# Patient Record
Sex: MALE | Race: WHITE | NOT HISPANIC OR LATINO | Employment: FULL TIME | URBAN - METROPOLITAN AREA
[De-identification: names, ages, dates, MRNs, and addresses within clinical notes are randomized per-mention and may not be internally consistent; named-entity substitution may affect disease eponyms.]

---

## 2017-02-15 DIAGNOSIS — E10.9 DIABETES MELLITUS TYPE 1 (H): ICD-10-CM

## 2017-02-15 RX ORDER — PEN NEEDLE, DIABETIC 31 GX5/16"
NEEDLE, DISPOSABLE MISCELLANEOUS
Qty: 300 EACH | Refills: 3 | Status: SHIPPED | OUTPATIENT
Start: 2017-02-15 | End: 2018-07-21

## 2017-04-04 ENCOUNTER — MYC MEDICAL ADVICE (OUTPATIENT)
Dept: NEUROLOGY | Facility: CLINIC | Age: 47
End: 2017-04-04

## 2017-04-04 DIAGNOSIS — E10.9 DM W/O COMPLICATION TYPE I (H): ICD-10-CM

## 2017-06-02 ENCOUNTER — TELEPHONE (OUTPATIENT)
Dept: ENDOCRINOLOGY | Facility: CLINIC | Age: 47
End: 2017-06-02

## 2017-06-02 DIAGNOSIS — E10.9 DM W/O COMPLICATION TYPE I (H): Primary | ICD-10-CM

## 2017-06-02 RX ORDER — INSULIN GLARGINE 100 [IU]/ML
26 INJECTION, SOLUTION SUBCUTANEOUS DAILY
Qty: 30 ML | Refills: 0 | Status: SHIPPED | OUTPATIENT
Start: 2017-06-02 | End: 2017-07-26

## 2017-06-02 NOTE — TELEPHONE ENCOUNTER
Writer reviewed Basaglar with patient.     He is agreeable with plan.    Prescriptions completed to pharmacy.    Follow up appt scheduled 7/26 with labs in morning.    Anju Barrios LPN  Adult Endocrinology  [facility]

## 2017-06-02 NOTE — TELEPHONE ENCOUNTER
[facility] Call Center    Phone Message    Name of Caller: Aniyah    Phone Number: Other phone number:  [phone]    Best time to return call: Any    May a detailed message be left on voicemail: NA    Relation to patient: Other Name: Aniyah  Relationship: [facility] [place]  Is there legal documentation in chart to discuss information with this person: NA    Reason for Call: Aniyah called and is requesting a Prior Authorization be started for Tl's Lantus.      Action Taken: Message routed to:  Adult Clinics: Endocrinology p [phone]

## 2017-07-26 ENCOUNTER — OFFICE VISIT (OUTPATIENT)
Dept: ENDOCRINOLOGY | Facility: CLINIC | Age: 47
End: 2017-07-26
Payer: COMMERCIAL

## 2017-07-26 VITALS
HEIGHT: 70 IN | DIASTOLIC BLOOD PRESSURE: 70 MMHG | SYSTOLIC BLOOD PRESSURE: 105 MMHG | WEIGHT: 209.66 LBS | HEART RATE: 62 BPM | BODY MASS INDEX: 30.02 KG/M2

## 2017-07-26 DIAGNOSIS — E10.9 TYPE 1 DIABETES MELLITUS WITHOUT COMPLICATION (H): ICD-10-CM

## 2017-07-26 DIAGNOSIS — E10.9 DM W/O COMPLICATION TYPE I (H): ICD-10-CM

## 2017-07-26 DIAGNOSIS — E78.00 HYPERCHOLESTEREMIA: ICD-10-CM

## 2017-07-26 LAB
ALBUMIN SERPL-MCNC: 3.6 G/DL (ref 3.4–5)
ALP SERPL-CCNC: 65 U/L (ref 40–150)
ALT SERPL W P-5'-P-CCNC: 26 U/L (ref 0–70)
ANION GAP SERPL CALCULATED.3IONS-SCNC: 5 MMOL/L (ref 3–14)
AST SERPL W P-5'-P-CCNC: 20 U/L (ref 0–45)
BILIRUB SERPL-MCNC: 0.8 MG/DL (ref 0.2–1.3)
BUN SERPL-MCNC: 18 MG/DL (ref 7–30)
CALCIUM SERPL-MCNC: 8.6 MG/DL (ref 8.5–10.1)
CHLORIDE SERPL-SCNC: 108 MMOL/L (ref 94–109)
CHOLEST SERPL-MCNC: 158 MG/DL
CK SERPL-CCNC: 194 U/L (ref 30–300)
CO2 SERPL-SCNC: 29 MMOL/L (ref 20–32)
CREAT SERPL-MCNC: 0.84 MG/DL (ref 0.66–1.25)
CREAT UR-MCNC: 246 MG/DL
GFR SERPL CREATININE-BSD FRML MDRD: NORMAL ML/MIN/1.7M2
GLUCOSE SERPL-MCNC: 86 MG/DL (ref 70–99)
HBA1C MFR BLD: 7.1 % (ref 4.3–6)
HCT VFR BLD AUTO: 44.2 % (ref 40–53)
HDLC SERPL-MCNC: 60 MG/DL
LDLC SERPL CALC-MCNC: 86 MG/DL
MICROALBUMIN UR-MCNC: 11 MG/L
MICROALBUMIN/CREAT UR: 4.43 MG/G CR (ref 0–17)
NONHDLC SERPL-MCNC: 98 MG/DL
POTASSIUM SERPL-SCNC: NORMAL MMOL/L (ref 3.4–5.3)
PROT SERPL-MCNC: 7 G/DL (ref 6.8–8.8)
SODIUM SERPL-SCNC: 142 MMOL/L (ref 133–144)
TRIGL SERPL-MCNC: 59 MG/DL
TSH SERPL DL<=0.005 MIU/L-ACNC: 1.37 MU/L (ref 0.4–4)

## 2017-07-26 PROCEDURE — 85014 HEMATOCRIT: CPT | Performed by: INTERNAL MEDICINE

## 2017-07-26 PROCEDURE — 99214 OFFICE O/P EST MOD 30 MIN: CPT | Performed by: INTERNAL MEDICINE

## 2017-07-26 PROCEDURE — 83036 HEMOGLOBIN GLYCOSYLATED A1C: CPT | Performed by: INTERNAL MEDICINE

## 2017-07-26 PROCEDURE — 82550 ASSAY OF CK (CPK): CPT | Performed by: INTERNAL MEDICINE

## 2017-07-26 PROCEDURE — 80053 COMPREHEN METABOLIC PANEL: CPT | Performed by: INTERNAL MEDICINE

## 2017-07-26 PROCEDURE — 36415 COLL VENOUS BLD VENIPUNCTURE: CPT | Performed by: INTERNAL MEDICINE

## 2017-07-26 PROCEDURE — 84443 ASSAY THYROID STIM HORMONE: CPT | Performed by: INTERNAL MEDICINE

## 2017-07-26 PROCEDURE — 82043 UR ALBUMIN QUANTITATIVE: CPT | Performed by: INTERNAL MEDICINE

## 2017-07-26 PROCEDURE — 80061 LIPID PANEL: CPT | Performed by: INTERNAL MEDICINE

## 2017-07-26 RX ORDER — ATORVASTATIN CALCIUM 10 MG/1
10 TABLET, FILM COATED ORAL DAILY
Qty: 90 TABLET | Refills: 3 | Status: SHIPPED | OUTPATIENT
Start: 2017-07-26 | End: 2018-08-29

## 2017-07-26 RX ORDER — INSULIN GLARGINE 100 [IU]/ML
26 INJECTION, SOLUTION SUBCUTANEOUS DAILY
Qty: 27 ML | Refills: 3 | Status: SHIPPED | OUTPATIENT
Start: 2017-07-26 | End: 2018-08-03

## 2017-07-26 NOTE — PATIENT INSTRUCTIONS
Sending blood sugars to your provider at [facility]:  We want to help you with your diabetes management, which often requires frequent adjustments to your therapy. For your convenience, we have several ways to send your blood sugars to your doctor for review.    - Send message directly to your doctor through My Chart.  Please ask the rooming staff if you would like to sign up for My Chart.  This is a fast and confidential way to send your information and communicate directly with your provider.   - Record readings and fax to [phone].  We have a template for you to use for your convenience.  - If you have a Medtronic pump, upload to [employer] and notify your provider of your username and password.   - Stop by the clinic with your meter for download.   - My Chart or call Dulce Jose, Diabetes Educator at [phone]  - Call the clinic and speak to one of the endocrine nurses to relay information on the telephone.  Anju Lawrence, or Madyson at [phone].   -    Please call the on-call Endocrinologist at the [facility] for after       hours/weekend needs at [phone] Option #4.    Please note that you do not need to FAST if you are just having an A1C drawn. Please remember to ALWAYS bring your glucose meter with to your appointment. This data is very important for the management of your care.    Thank you!  Your [facility] Diabetes Care Team

## 2017-07-26 NOTE — NURSING NOTE
"Tl Sands's goals for this visit include: Follow up Diabetes  He requests these members of his care team be copied on today's visit information: NO    PCP: Cathryn Leung    Referring Provider:  No referring provider defined for this encounter.    Chief Complaint   Patient presents with     Diabetes       Initial There were no vitals taken for this visit. Estimated body mass index is 30.02 kg/(m^2) as calculated from the following:    Height as of 9/13/16: 1.778 m (5' 10\").    Weight as of 9/13/16: 94.9 kg (209 lb 3.5 oz).  Medication Reconciliation: complete    Do you need any medication refills at today's visit? NO    "

## 2017-07-26 NOTE — PROGRESS NOTES
Tl Sands is a 46 year old man seen in follow-up.     Tl was diagnosed with type 1 diabetes in 2005. He has not known diabetes complications and his average hemoglobin A1c over the recent years has been around 7 to 7.5. Today, it was 7.1, down from 7.6 in September last year.    He forgot to bring his meter. Reports checking his blood sugar 3 times a day, always in the morning and before dinner.  He experiences hypoglycemic episodes once a month, a few hours after going to sleep and he assumes some of them are due to taking a higher dose of insulin for the bedtime snack.  In general, he does a good job in taking insulin for food intake, with occasional missed dosages around lunchtime.  As a result, the predinner blood sugar tends to be elevated.  Current insulin regimen is 26 U Basaglar in am, 1 U Novolog per 10 grams CHO and a correction scale of 1 U per 25 mg above 120. According to the patient, the daily total dose of Novolog is around 30 U.     Diabetes complications:  Last eye exam 6/2016 - no DR   No h/o proteinuria   No numbness or tingling sensation   He denies prior episodes of loss of consciousness due to hypoglycemia.  The lowest blood glucose he remembers was 40.  He is able to recognize the hypoglycemic episodes.  In the 60s, he gets sweaty, weak, and he develops shortness of breath.  He has glucagon at home and his family members know how to use it.  He does kickboxing, cardio, golfing during summertime. He is coaching skating wintertime.   He has been compliant in taking atorvastatin at a dose of 10 mg 2-3 times a week.      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   Type 1 diabetes   Tinea pedis   Finger fracture     PSM:  B/L inguinal hernia repair   B/L arthroscopic knee surgery      Prescription Medications as of 7/26/2017             insulin glargine (BASAGLAR KWIKPEN) 100 UNIT/ML injection Inject 26 Units Subcutaneous daily    insulin lispro (HUMALOG KWIKPEN) 100 UNIT/ML  "injection INJECT 5-10 UNITS SUBCUTANEOUSLY BEFORE MEALS    B-D U/F 31G X 8 MM insulin pen needle USE ONE  FIVE TIMES DAILY (TO  BE  USED  WITH  INSULIN  PEN,  3  MEALS  AND  AT  BEDTIME)    atorvastatin (LIPITOR) 10 MG tablet TAKE ONE TABLET BY MOUTH ONCE DAILY    tadalafil (CIALIS) 20 MG tablet Take 1 tablet by mouth. As needed    sildenafil (VIAGRA) 100 MG tablet Take 1 tablet (100 mg) by mouth daily as needed for erectile dysfunction    glucagon (GLUCAGON EMERGENCY) 1 MG injection Inject 1 mg Subcutaneous once for 1 dose    econazole nitrate 1 % cream Apply topically 2 times daily    mometasone (ELOCON) 0.1 % lotion Apply twice daily    glucose blood VI test strips strip Test 4-5 times daily    LANCETS REGULAR MISC 1 Device 4 times daily.        HABITS:  He does not use tobacco or alcohol.      SOCIAL HISTORY:  He is  and lives with his wife and 2 of his 3 children in [place]. 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      Family history  Maternal grandfather - type 1 diabetes. Paternal uncle also has type 1 diabetes. No f/h of thyroid disease. Paternal uncle - colon cancer.     ROS: 10 point ROS neg other than below:  Weight stable   Occasional R hip pain   LBP      PHYSICAL EXAMINATION:   Wt Readings from Last 10 Encounters:   07/26/17 95.1 kg (209 lb 10.5 oz)   09/13/16 94.9 kg (209 lb 3.5 oz)   03/15/16 90.5 kg (199 lb 9 oz)   09/23/15 91.7 kg (202 lb 3.2 oz)   03/17/15 91.6 kg (201 lb 15.1 oz)   12/03/14 92.9 kg (204 lb 14.4 oz)   05/07/14 93.4 kg (206 lb)   08/28/13 94.8 kg (209 lb)   02/13/13 92.5 kg (204 lb)   08/01/12 89.4 kg (197 lb)     /70  Pulse 62  Ht 1.778 m (5' 10\")  Wt 95.1 kg (209 lb 10.5 oz)  BMI 30.08 kg/m2    GENERAL:  A healthy-appearing man.   EYES:  Extraocular movements are intact.  Sclerae are clear.  No retinopathy appreciated.   NECK:  No goiter, bruit or adenopathy.   CHEST:  Clear to auscultation.   CARDIOVASCULAR:  Regular rate and rhythm.  No murmur. "   ABDOMEN:  Soft.  No hepatomegaly or lipohypertrophy.   EXTREMITIES:  No pretibial edema. Ankle jerks absent bilaterally.  SKIN: hyperpigmented scaly lesion on the R temple; psoriatic scalp lesions   Feet: Sensation intact to monofilament testing, skin intact     LABORATORY TESTS:   I reviewed prior lab results documented in Epic.   Lab Results   Component Value Date    A1C 7.1 (H) 07/26/2017    A1C 7.6 09/13/2016    A1C 7.5 (H) 03/15/2016    A1C 7.9 (H) 09/23/2015    A1C 7.4 (H) 03/17/2015     Hemoglobin   Date Value Ref Range Status   12/03/2014 14.9 13.3 - 17.7 g/dL Final     Hematocrit   Date Value Ref Range Status   07/26/2017 44.2 40.0 - 53.0 % Final     Cholesterol   Date Value Ref Range Status   03/15/2016 164 <200 mg/dL Final     Cholesterol/HDL Ratio   Date Value Ref Range Status   03/17/2015 3.2 0.0 - 5.0 Final     HDL Cholesterol   Date Value Ref Range Status   03/15/2016 56 >39 mg/dL Final     LDL Cholesterol Calculated   Date Value Ref Range Status   03/15/2016 92 <100 mg/dL Final     Comment:     Desirable:       <100 mg/dl     No results found for: MICROALBUMIN  TSH   Date Value Ref Range Status   03/15/2016 1.45 0.40 - 4.00 mU/L Final       Last Basic Metabolic Panel:    Sodium   Date Value Ref Range Status   03/15/2016 142 133 - 144 mmol/L Final     Potassium   Date Value Ref Range Status   03/15/2016 3.9 3.4 - 5.3 mmol/L Final     Chloride   Date Value Ref Range Status   03/15/2016 106 94 - 109 mmol/L Final     Calcium   Date Value Ref Range Status   03/15/2016 9.0 8.5 - 10.1 mg/dL Final     Carbon Dioxide   Date Value Ref Range Status   03/15/2016 30 20 - 32 mmol/L Final     Urea Nitrogen   Date Value Ref Range Status   03/15/2016 17 7 - 30 mg/dL Final     Creatinine   Date Value Ref Range Status   03/15/2016 0.96 0.66 - 1.25 mg/dL Final     No results found for: GFR  Glucose   Date Value Ref Range Status   03/15/2016 178 (H) 70 - 99 mg/dL Final       No results found for: AST  ALT   Date Value  Ref Range Status   03/17/2015 28 0 - 70 U/L Final     No results found for: ALBUMIN    Assessment and plan:    1.  Type 1 diabetes mellitus, fairly well controlled, with no known diabetes complications.   Discussed about the option of considering a DEXCOM G5, in order to help him attains a better blood glucose control. He is going to check coverage with his insurance.    2. Hypercholesterolemia, controlled on atorvastatin taken 2-3 times a week.      RTC 6 months.     No orders of the defined types were placed in this encounter.

## 2017-07-26 NOTE — MR AVS SNAPSHOT
After Visit Summary   7/26/2017    Tl Sands    MRN: [id number]           Patient Information     Date Of Birth          1970        Visit Information        Provider Department      7/26/2017 8:00 AM Rupali Resendiz MD [facility]        Today's Diagnoses     DM w/o complication type I (H)        Hypercholesteremia          Care Instructions      Sending blood sugars to your provider at [facility]:  We want to help you with your diabetes management, which often requires frequent adjustments to your therapy. For your convenience, we have several ways to send your blood sugars to your doctor for review.    - Send message directly to your doctor through My Chart.  Please ask the rooming staff if you would like to sign up for My Chart.  This is a fast and confidential way to send your information and communicate directly with your provider.   - Record readings and fax to [phone].  We have a template for you to use for your convenience.  - If you have a Medtronic pump, upload to [employer] and notify your provider of your username and password.   - Stop by the clinic with your meter for download.   - My Chart or call Dulce Jose, Diabetes Educator at [phone]  - Call the clinic and speak to one of the endocrine nurses to relay information on the telephone.  Anju Lawrence or Madyson at [phone].   -    Please call the on-call Endocrinologist at the [facility] for after       hours/weekend needs at [phone] Option #4.    Please note that you do not need to FAST if you are just having an A1C drawn. Please remember to ALWAYS bring your glucose meter with to your appointment. This data is very important for the management of your care.    Thank you!  Your [facility] Diabetes Care Team                Follow-ups after your visit        Your next 10 appointments already scheduled     Jul 18, 2018  7:15 AM CDT   Return Visit with Rupali Resendiz MD,  "[facility]    [address] [address] [place] [id number] [phone]              Who to contact     If you have questions or need follow up information about today's clinic visit or your schedule please contact [facility] directly at [phone].  Normal or non-critical lab and imaging results will be communicated to you by MyChart, letter or phone within 4 business days after the clinic has received the results. If you do not hear from us within 7 days, please contact the clinic through [employer] or phone. If you have a critical or abnormal lab result, we will notify you by phone as soon as possible.  Submit refill requests through [employer] or call your pharmacy and they will forward the refill request to us. Please allow 3 business days for your refill to be completed.          Additional Information About Your Visit        [employer] Information     [employer] gives you secure access to your electronic health record. If you see a primary care provider, you can also send messages to your care team and make appointments. If you have questions, please call your primary care clinic.  If you do not have a primary care provider, please call [phone] and they will assist you.      [employer] is an electronic gateway that provides easy, online access to your medical records. With [employer], you can request a clinic appointment, read your test results, renew a prescription or communicate with your care team.     To access your existing account, please contact your [facility] or call [phone] for assistance.        Care EveryWhere ID     This is your Care EveryWhere ID. This could be used by other organizations to access your [facility] medical records  [id number]        Your Vitals Were     Pulse Height BMI (Body Mass Index)             62 1.778 m (5' 10\") 30.08 kg/m2          Blood Pressure from Last 3 " Encounters:   07/26/17 105/70   09/13/16 104/70   03/15/16 126/72    Weight from Last 3 Encounters:   07/26/17 95.1 kg (209 lb 10.5 oz)   09/13/16 94.9 kg (209 lb 3.5 oz)   03/15/16 90.5 kg (199 lb 9 oz)              Today, you had the following     No orders found for display         Today's Medication Changes          These changes are accurate as of: 7/26/17  8:33 AM.  If you have any questions, ask your nurse or doctor.               These medicines have changed or have updated prescriptions.        Dose/Directions    atorvastatin 10 MG tablet   Commonly known as:  LIPITOR   This may have changed:  See the new instructions.   Used for:  Hypercholesteremia   Changed by:  Rupali Resendiz MD        Dose:  10 mg   Take 1 tablet (10 mg) by mouth daily   Quantity:  90 tablet   Refills:  3            Where to get your medicines      These medications were sent to [facility] [address] [place] [address] [address] [facility] [address]  [address] [facility] [address] [facility] [address]     Phone:  [phone]     atorvastatin 10 MG tablet    insulin glargine 100 UNIT/ML injection    insulin lispro 100 UNIT/ML injection                Primary Care Provider Office Phone # Fax #    Antolinliyah JANET Leung [phone]       [address] [address]        Equal Access to Services     [facility] CenterHEATHER AH: Hadii george brumfield hadasho Soradha, waaxda luqadaha, qaybta kaalmada adeegyada, avinash marino. So [facility] [phone].    ATENCIÓN: Si habla español, tiene a graham disposición servicios gratuitos de asistencia lingüística. Llame al [phone].    We comply with applicable federal civil rights laws and [place] laws. We do not discriminate on the basis of race, color, national origin, age, disability sex, sexual orientation or gender identity.            Thank you!     Thank you for choosing [facility]  for your care. Our goal is always to  provide you with excellent care. Hearing back from our patients is one way we can continue to improve our services. Please take a few minutes to complete the written survey that you may receive in the mail after your visit with us. Thank you!             Your Updated Medication List - Protect others around you: Learn how to safely use, store and throw away your medicines at www.disposemymeds.org.          This list is accurate as of: 7/26/17  8:33 AM.  Always use your most recent med list.                   Brand Name Dispense Instructions for use Diagnosis    atorvastatin 10 MG tablet    LIPITOR    90 tablet    Take 1 tablet (10 mg) by mouth daily    Hypercholesteremia       B-D U/F 31G X 8 MM   Generic drug:  insulin pen needle     300 each    USE ONE  FIVE TIMES DAILY (TO  BE  USED  WITH  INSULIN  PEN,  3  MEALS  AND  AT  BEDTIME)    Diabetes mellitus type 1 (H)       blood glucose monitoring test strip    no brand specified    400 strip    Test 4-5 times daily    Type 1 diabetes, HbA1c goal < 7% (H)       econazole nitrate 1 % cream     60 g    Apply topically 2 times daily    Diabetes mellitus type 1 (H), Erythrasma       glucagon 1 MG kit    GLUCAGON EMERGENCY    1 mg    Inject 1 mg Subcutaneous once for 1 dose    Diabetes mellitus type 1 (H), Erythrasma       insulin glargine 100 UNIT/ML injection     27 mL    Inject 26 Units Subcutaneous daily    DM w/o complication type I (H)       insulin lispro 100 UNIT/ML injection    HumaLOG KWIKpen    30 mL    INJECT 5-10 UNITS SUBCUTANEOUSLY BEFORE MEALS    DM w/o complication type I (H)       LANCETS REGULAR Misc     360 each    1 Device 4 times daily.    Diabetes mellitus, type 1       mometasone 0.1 % solution (lotion)    ELOCON    60 mL    Apply twice daily    SBD (seborrhoeic dermatitis)       sildenafil 100 MG cap/tab    REVATIO/VIAGRA    30 tablet    Take 1 tablet (100 mg) by mouth daily as needed for erectile dysfunction    Diabetes mellitus type 1 (H)        tadalafil 20 MG tablet    CIALIS    20 tablet    Take 1 tablet by mouth. As needed    Erythrasma

## 2017-11-02 ENCOUNTER — OFFICE VISIT (OUTPATIENT)
Dept: URGENT CARE | Facility: URGENT CARE | Age: 47
End: 2017-11-02
Payer: COMMERCIAL

## 2017-11-02 VITALS
TEMPERATURE: 98.5 F | OXYGEN SATURATION: 98 % | BODY MASS INDEX: 30.28 KG/M2 | WEIGHT: 211 LBS | HEART RATE: 62 BPM | DIASTOLIC BLOOD PRESSURE: 76 MMHG | SYSTOLIC BLOOD PRESSURE: 132 MMHG

## 2017-11-02 DIAGNOSIS — J06.9 URI WITH COUGH AND CONGESTION: Primary | ICD-10-CM

## 2017-11-02 PROCEDURE — 99213 OFFICE O/P EST LOW 20 MIN: CPT | Performed by: PHYSICIAN ASSISTANT

## 2017-11-02 RX ORDER — BENZONATATE 200 MG/1
200 CAPSULE ORAL 3 TIMES DAILY PRN
Qty: 30 CAPSULE | Refills: 0 | Status: SHIPPED | OUTPATIENT
Start: 2017-11-02 | End: 2017-11-12

## 2017-11-02 RX ORDER — AZITHROMYCIN 250 MG/1
TABLET, FILM COATED ORAL
Qty: 6 TABLET | Refills: 0 | Status: SHIPPED | OUTPATIENT
Start: 2017-11-02 | End: 2018-08-29

## 2017-11-02 NOTE — NURSING NOTE
"Chief Complaint   Patient presents with     Cough     Pt c/o cough for one week.       Initial /76 (BP Location: Left arm, Patient Position: Chair, Cuff Size: Adult Large)  Pulse 62  Temp 98.5  F (36.9  C) (Oral)  Wt 211 lb (95.7 kg)  SpO2 98%  BMI 30.28 kg/m2 Estimated body mass index is 30.28 kg/(m^2) as calculated from the following:    Height as of 7/26/17: 5' 10\" (1.778 m).    Weight as of this encounter: 211 lb (95.7 kg).  Medication Reconciliation: completecomplete    Maranda Barroso CMA (AAMA)      "

## 2017-11-02 NOTE — PROGRESS NOTES
SUBJECTIVE:   Tl Sands is a 47 year old male who presents to clinic today for the following health issues:    RESPIRATORY SYMPTOMS      Duration: one week    Description  cough    Severity: moderate    Accompanying signs and symptoms: None    History (predisposing factors):  Seems to be getting worse, trouble sleeping past two evenings    Precipitating or alleviating factors: None    Therapies tried and outcome:  Rest and fluids, dayquil, nyquil- little relief.    This started with a scratchy throat last week  It has progressed  It is tough to sleep due to coughing - cough to the point of gagging  He has a headache and has pain under his tongue  Not a sore throat  Unproductive cough  At first it was productive in the morning    He has DM type 1, with good control  No history of asthma or smoking    Fever: no  Wheeze: no   Difficulty breathing:no  Shortness of breath on exertion:no  Chest pain:no  Headache: started today     ROS:  As in HPI      PROBLEM LIST:  Patient Active Problem List    Diagnosis Date Noted     DM w/o complication type I (H) 09/13/2016     Priority: Medium     CARDIOVASCULAR SCREENING; LDL GOAL LESS THAN 100 10/31/2010     Priority: Medium     Epidermoid cyst of skin 01/05/2009     Priority: Medium     (Problem list name updated by automated process. Provider to review and confirm.)       Other psoriasis 12/05/2007     Priority: Medium      MEDICATIONS:  Current Outpatient Prescriptions   Medication Sig Dispense Refill     insulin glargine (BASAGLAR KWIKPEN) 100 UNIT/ML injection Inject 26 Units Subcutaneous daily 27 mL 3     insulin lispro (HUMALOG KWIKPEN) 100 UNIT/ML injection INJECT 5-10 UNITS SUBCUTANEOUSLY BEFORE MEALS 30 mL 3     atorvastatin (LIPITOR) 10 MG tablet Take 1 tablet (10 mg) by mouth daily 90 tablet 3     B-D U/F 31G X 8 MM insulin pen needle USE ONE  FIVE TIMES DAILY (TO  BE  USED  WITH  INSULIN  PEN,  3  MEALS  AND  AT  BEDTIME) 300 each 3     tadalafil (CIALIS) 20  MG tablet Take 1 tablet by mouth. As needed 20 tablet prn     sildenafil (VIAGRA) 100 MG tablet Take 1 tablet (100 mg) by mouth daily as needed for erectile dysfunction 30 tablet 3     econazole nitrate 1 % cream Apply topically 2 times daily 60 g 11     mometasone (ELOCON) 0.1 % lotion Apply twice daily 60 mL 3     glucose blood VI test strips strip Test 4-5 times daily 400 strip 3     LANCETS REGULAR MISC 1 Device 4 times daily. 360 each 3     glucagon (GLUCAGON EMERGENCY) 1 MG injection Inject 1 mg Subcutaneous once for 1 dose 1 mg 0     [DISCONTINUED] BD ULTRA FINE PEN NEEDLES Inject 1 each as directed 5 times daily. To be used with insulin pen, 3 meals and bedtime 300 Device 3      ALLERGIES:  No Known Allergies    Problem list and histories reviewed & adjusted, as indicated.    OBJECTIVE:  /76 (BP Location: Left arm, Patient Position: Chair, Cuff Size: Adult Large)  Pulse 62  Temp 98.5  F (36.9  C) (Oral)  Wt 211 lb (95.7 kg)  SpO2 98%  BMI 30.28 kg/m2     GENERAL APPEARANCE: healthy, alert and no distress  EYES: EOMI,  PERRL, conjunctiva clear  HENT: ear canals and TM's normal.  Nose and mouth without ulcers, erythema or lesions  NECK: supple, nontender, no lymphadenopathy  RESP: lungs clear to auscultation - no rales, rhonchi or wheezes  CV: regular rates and rhythm, normal S1 S2, no murmur noted  NEURO: Normal strength and tone, sensory exam grossly normal,  normal speech and mentation  SKIN: no suspicious lesions or rashes    DIAGNOSTICS: None      ASSESSMENT/PLAN:    1. URI with cough and congestion  Coughing spells are suggestive of possible pertussis, so we will treat  - azithromycin (ZITHROMAX) 250 MG tablet; 2 tablets the first day, then 1 tablet daily for the next 4 days  Dispense: 6 tablet; Refill: 0  - benzonatate (TESSALON) 200 MG capsule; Take 1 capsule (200 mg) by mouth 3 times daily as needed for cough  Dispense: 30 capsule; Refill: 0    Charlotte Cheek PA-C

## 2017-11-02 NOTE — MR AVS SNAPSHOT
After Visit Summary   11/2/2017    Tl Sands    MRN: [id number]           Patient Information     Date Of Birth          1970        Visit Information        Provider Department      11/2/2017 11:40 AM Charlotte Cheek PA-C [facility]        Today's Diagnoses     URI with cough and congestion    -  1       Follow-ups after your visit        Your next 10 appointments already scheduled     Jul 18, 2018  7:15 AM CDT   Return Visit with Rupali Resendiz MD, MG ENDO NURSE   [facility]    [id number] [place] [address] [phone]              Who to contact     If you have questions or need follow up information about today's clinic visit or your schedule please contact [facility] directly at [phone].  Normal or non-critical lab and imaging results will be communicated to you by MyChart, letter or phone within 4 business days after the clinic has received the results. If you do not hear from us within 7 days, please contact the clinic through MyChart or phone. If you have a critical or abnormal lab result, we will notify you by phone as soon as possible.  Submit refill requests through Findersfee or call your pharmacy and they will forward the refill request to us. Please allow 3 business days for your refill to be completed.          Additional Information About Your Visit        MyChart Information     Findersfee gives you secure access to your electronic health record. If you see a primary care provider, you can also send messages to your care team and make appointments. If you have questions, please call your primary care clinic.  If you do not have a primary care provider, please call [phone] and they will assist you.        Care EveryWhere ID     This is your Care EveryWhere ID. This could be used by other organizations to access your [facility]  records  [id number]        Your Vitals Were     Pulse Temperature Pulse Oximetry BMI (Body Mass Index)          62 98.5  F (36.9  C) (Oral) 98% 30.28 kg/m2         Blood Pressure from Last 3 Encounters:   11/02/17 132/76   07/26/17 105/70   09/13/16 104/70    Weight from Last 3 Encounters:   11/02/17 211 lb (95.7 kg)   07/26/17 209 lb 10.5 oz (95.1 kg)   09/13/16 209 lb 3.5 oz (94.9 kg)              Today, you had the following     No orders found for display         Today's Medication Changes          These changes are accurate as of: 11/2/17  2:15 PM.  If you have any questions, ask your nurse or doctor.               Start taking these medicines.        Dose/Directions    azithromycin 250 MG tablet   Commonly known as:  ZITHROMAX   Used for:  URI with cough and congestion   Started by:  Charlotte Cheek PA-C        2 tablets the first day, then 1 tablet daily for the next 4 days   Quantity:  6 tablet   Refills:  0       benzonatate 200 MG capsule   Commonly known as:  TESSALON   Used for:  URI with cough and congestion   Started by:  Charlotte Cheek PA-C        Dose:  200 mg   Take 1 capsule (200 mg) by mouth 3 times daily as needed for cough   Quantity:  30 capsule   Refills:  0            Where to get your medicines      [facility] [address] [place] [address] [address] [facility] [address]  [address] [facility] [address] [facility] [address]     Phone:  [phone]     azithromycin 250 MG tablet    benzonatate 200 MG capsule                Primary Care Provider Office Phone # Fax #    Cathryn Leung [phone]       [facility] [address] [address]        Equal Access to Services     WILNER CASTANEDA AH: Sean Martin, ulysses david, qamaurisio kaalmahin leon, avinash Richards [facility] [phone].    ATENCIÓN: Si habla español, tiene a graham disposición servicios gratuitos de asistencia lingüística. Dwaine  al [phone].    We comply with applicable federal civil rights laws and [place] laws. We do not discriminate on the basis of race, color, national origin, age, disability, sex, sexual orientation, or gender identity.            Thank you!     Thank you for choosing [facility]  for your care. Our goal is always to provide you with excellent care. Hearing back from our patients is one way we can continue to improve our services. Please take a few minutes to complete the written survey that you may receive in the mail after your visit with us. Thank you!             Your Updated Medication List - Protect others around you: Learn how to safely use, store and throw away your medicines at www.disposemymeds.org.          This list is accurate as of: 11/2/17  2:15 PM.  Always use your most recent med list.                   Brand Name Dispense Instructions for use Diagnosis    atorvastatin 10 MG tablet    LIPITOR    90 tablet    Take 1 tablet (10 mg) by mouth daily    Hypercholesteremia       azithromycin 250 MG tablet    ZITHROMAX    6 tablet    2 tablets the first day, then 1 tablet daily for the next 4 days    URI with cough and congestion       B-D U/F 31G X 8 MM   Generic drug:  insulin pen needle     300 each    USE ONE  FIVE TIMES DAILY (TO  BE  USED  WITH  INSULIN  PEN,  3  MEALS  AND  AT  BEDTIME)    Diabetes mellitus type 1 (H)       BASAGLAR 100 UNIT/ML injection     27 mL    Inject 26 Units Subcutaneous daily    DM w/o complication type I (H)       benzonatate 200 MG capsule    TESSALON    30 capsule    Take 1 capsule (200 mg) by mouth 3 times daily as needed for cough    URI with cough and congestion       blood glucose monitoring test strip    no brand specified    400 strip    Test 4-5 times daily    Type 1 diabetes, HbA1c goal < 7% (H)       econazole nitrate 1 % cream     60 g    Apply topically 2 times daily    Diabetes mellitus type 1 (H), Erythrasma       glucagon 1 MG kit     GLUCAGON EMERGENCY    1 mg    Inject 1 mg Subcutaneous once for 1 dose    Diabetes mellitus type 1 (H), Erythrasma       insulin lispro 100 UNIT/ML injection    HumaLOG KWIKpen    30 mL    INJECT 5-10 UNITS SUBCUTANEOUSLY BEFORE MEALS    DM w/o complication type I (H)       LANCETS REGULAR Misc     360 each    1 Device 4 times daily.    Diabetes mellitus, type 1       mometasone 0.1 % solution (lotion)    ELOCON    60 mL    Apply twice daily    SBD (seborrhoeic dermatitis)       sildenafil 100 MG tablet    VIAGRA    30 tablet    Take 1 tablet (100 mg) by mouth daily as needed for erectile dysfunction    Diabetes mellitus type 1 (H)       tadalafil 20 MG tablet    CIALIS    20 tablet    Take 1 tablet by mouth. As needed    Erythrasma

## 2017-11-22 DIAGNOSIS — E10.9 DM W/O COMPLICATION TYPE I (H): ICD-10-CM

## 2017-11-22 RX ORDER — INSULIN LISPRO 100 [IU]/ML
INJECTION, SOLUTION INTRAVENOUS; SUBCUTANEOUS
Qty: 15 ML | Refills: 3 | Status: SHIPPED | OUTPATIENT
Start: 2017-11-22 | End: 2018-08-03

## 2017-11-22 NOTE — TELEPHONE ENCOUNTER
Patient returned call and left voicemail stating that he ran out of refills on the Humalog and wanted to make sure he had enough for the holidays. Patient stated on voicemail that he is doing great and no concerns with his diabetes.       Melinda Singh RN  Endocrine Care Coordinator  [facility]

## 2017-11-22 NOTE — TELEPHONE ENCOUNTER
[facility] Call Center    Phone Message    Name of Caller: Tl    Phone Number: Cell number on file:    Telephone Information:   Mobile [phone]       Best time to return call: Any    May a detailed message be left on voicemail: yes    Relation to patient: Self    Reason for Call: Tl called and requested a refill of insulin lispro (HUMALOG KWIKPEN) 100 UNIT/ML injection.  He is out of the medication and in requesting the refill be expedited.  Thank you.      Action Taken: Message routed to:  Adult Clinics: Endocrinology p [phone]

## 2017-11-22 NOTE — TELEPHONE ENCOUNTER
Chart review. Refill completed.     Attempted to contact patient to review and confirm has not been without insulin and to review current blood sugars. Left detailed voicemail for patient as per request to contact our office.       Melinda Singh RN  Endocrine Care Coordinator  [facility]

## 2017-12-07 ENCOUNTER — RADIANT APPOINTMENT (OUTPATIENT)
Dept: GENERAL RADIOLOGY | Facility: CLINIC | Age: 47
End: 2017-12-07
Attending: PHYSICIAN ASSISTANT
Payer: COMMERCIAL

## 2017-12-07 ENCOUNTER — OFFICE VISIT (OUTPATIENT)
Dept: URGENT CARE | Facility: URGENT CARE | Age: 47
End: 2017-12-07
Payer: COMMERCIAL

## 2017-12-07 VITALS
DIASTOLIC BLOOD PRESSURE: 78 MMHG | WEIGHT: 210 LBS | BODY MASS INDEX: 30.13 KG/M2 | OXYGEN SATURATION: 98 % | SYSTOLIC BLOOD PRESSURE: 125 MMHG | HEART RATE: 80 BPM | TEMPERATURE: 98.4 F

## 2017-12-07 DIAGNOSIS — R05.9 COUGH: ICD-10-CM

## 2017-12-07 DIAGNOSIS — J20.9 ACUTE BRONCHITIS TREATED WITH ANTIBIOTICS IN THE PAST 60 DAYS: Primary | ICD-10-CM

## 2017-12-07 PROCEDURE — 99214 OFFICE O/P EST MOD 30 MIN: CPT | Mod: 25 | Performed by: PHYSICIAN ASSISTANT

## 2017-12-07 PROCEDURE — 71020 XR CHEST 2 VW: CPT

## 2017-12-07 PROCEDURE — 94640 AIRWAY INHALATION TREATMENT: CPT | Performed by: PHYSICIAN ASSISTANT

## 2017-12-07 RX ORDER — ALBUTEROL SULFATE 90 UG/1
AEROSOL, METERED RESPIRATORY (INHALATION)
Qty: 1 INHALER | Refills: 0 | Status: SHIPPED | OUTPATIENT
Start: 2017-12-07 | End: 2018-08-29

## 2017-12-07 RX ORDER — PREDNISONE 20 MG/1
20 TABLET ORAL DAILY
Qty: 5 TABLET | Refills: 0 | Status: SHIPPED | OUTPATIENT
Start: 2017-12-07 | End: 2017-12-12

## 2017-12-07 RX ORDER — CODEINE PHOSPHATE AND GUAIFENESIN 10; 100 MG/5ML; MG/5ML
1-2 SOLUTION ORAL EVERY 6 HOURS PRN
Qty: 120 ML | Refills: 0 | Status: SHIPPED | OUTPATIENT
Start: 2017-12-07 | End: 2018-08-29

## 2017-12-07 RX ORDER — IPRATROPIUM BROMIDE AND ALBUTEROL SULFATE 2.5; .5 MG/3ML; MG/3ML
1 SOLUTION RESPIRATORY (INHALATION) ONCE
Qty: 3 ML | Refills: 0 | Status: SHIPPED | OUTPATIENT
Start: 2017-12-07 | End: 2018-10-08

## 2017-12-07 ASSESSMENT — ENCOUNTER SYMPTOMS
HEADACHES: 0
WHEEZING: 1
SORE THROAT: 0
DIARRHEA: 0
EYE DISCHARGE: 0
NAUSEA: 0
PALPITATIONS: 0
CHILLS: 0
ABDOMINAL PAIN: 0
SHORTNESS OF BREATH: 0
VOMITING: 0
COUGH: 1
FEVER: 0
MYALGIAS: 0
EYE REDNESS: 0
BLURRED VISION: 0

## 2017-12-07 NOTE — MR AVS SNAPSHOT
After Visit Summary   12/7/2017    Tl Sands    MRN: [id number]           Patient Information     Date Of Birth          1970        Visit Information        Provider Department      12/7/2017 6:10 PM Ashley Rosales PA-C [facility]        Today's Diagnoses     Acute bronchitis treated with antibiotics in the past 60 days    -  1    Cough           Follow-ups after your visit        Follow-up notes from your care team     Return if symptoms worsen or fail to improve.      Your next 10 appointments already scheduled     Jul 18, 2018  7:15 AM CDT   Return Visit with Rupali Resendiz MD, MG ENDO NURSE   [facility]    [address] [place] [address] [phone]              Who to contact     If you have questions or need follow up information about today's clinic visit or your schedule please contact [facility] directly at [phone].  Normal or non-critical lab and imaging results will be communicated to you by RVXhart, letter or phone within 4 business days after the clinic has received the results. If you do not hear from us within 7 days, please contact the clinic through Zebtabt or phone. If you have a critical or abnormal lab result, we will notify you by phone as soon as possible.  Submit refill requests through Microsaic or call your pharmacy and they will forward the refill request to us. Please allow 3 business days for your refill to be completed.          Additional Information About Your Visit        MyChart Information     Microsaic gives you secure access to your electronic health record. If you see a primary care provider, you can also send messages to your care team and make appointments. If you have questions, please call your primary care clinic.  If you do not have a primary care provider, please call [phone] and they will assist you.        Care  EveryWhere ID     This is your Care EveryWhere ID. This could be used by other organizations to access your [facility] medical records  [id number]        Your Vitals Were     Pulse Temperature Pulse Oximetry BMI (Body Mass Index)          80 98.4  F (36.9  C) (Oral) 98% 30.13 kg/m2         Blood Pressure from Last 3 Encounters:   12/07/17 125/78   11/02/17 132/76   07/26/17 105/70    Weight from Last 3 Encounters:   12/07/17 210 lb (95.3 kg)   11/02/17 211 lb (95.7 kg)   07/26/17 209 lb 10.5 oz (95.1 kg)              We Performed the Following     INHALATION/NEBULIZER TREATMENT, INITIAL          Today's Medication Changes          These changes are accurate as of: 12/7/17  8:14 PM.  If you have any questions, ask your nurse or doctor.               Start taking these medicines.        Dose/Directions    albuterol 108 (90 BASE) MCG/ACT Inhaler   Commonly known as:  PROAIR HFA/PROVENTIL HFA/VENTOLIN HFA   Used for:  Acute bronchitis treated with antibiotics in the past 60 days   Started by:  Ashley Rosales PA-C        Inhale 2 puffs every 4-6 hours as needed for cough, wheezing, or shortness of breath   Quantity:  1 Inhaler   Refills:  0       guaiFENesin-codeine 100-10 MG/5ML Soln solution   Commonly known as:  ROBITUSSIN AC   Used for:  Acute bronchitis treated with antibiotics in the past 60 days   Started by:  Ashley Rosales PA-C        Dose:  1-2 tsp.   Take 5-10 mLs by mouth every 6 hours as needed for cough   Quantity:  120 mL   Refills:  0       ipratropium - albuterol 0.5 mg/2.5 mg/3 mL 0.5-2.5 (3) MG/3ML neb solution   Commonly known as:  DUONEB   Used for:  Cough   Started by:  Ashley Rosales PA-C        Dose:  1 vial   Take 1 vial (3 mLs) by nebulization once for 1 dose   Quantity:  3 mL   Refills:  0       predniSONE 20 MG tablet   Commonly known as:  DELTASONE   Used for:  Acute bronchitis treated with antibiotics in the past 60 days   Started by:  Ashley Rosales PA-C        Dose:  20 mg    Take 1 tablet (20 mg) by mouth daily for 5 days   Quantity:  5 tablet   Refills:  0            Where to get your medicines      These medications were sent to [facility] [address] [place], [place] [address]  [address] [place] [address]     Phone:  [phone]     albuterol 108 (90 BASE) MCG/ACT Inhaler    ipratropium - albuterol 0.5 mg/2.5 mg/3 mL 0.5-2.5 (3) MG/3ML neb solution    predniSONE 20 MG tablet         Some of these will need a paper prescription and others can be bought over the counter.  Ask your nurse if you have questions.     Bring a paper prescription for each of these medications     guaiFENesin-codeine 100-10 MG/5ML Soln solution                Primary Care Provider Office Phone # Fax #    Cathryn Leung [phone]       [facility] [address] [place] [address]        Equal Access to Services     [facility] Center[facility] AH: Hadii aad ku hadasho Soomaali, waaxda luqadaha, qaybta kaalmada adeegyada, waxay idiin hayebonie connolly . So [facility] [phone].    ATENCIÓN: Si habla español, tiene a graham disposición servicios gratuitos de asistencia lingüística. [facility] [phone].    We comply with applicable federal civil rights laws and [place] laws. We do not discriminate on the basis of race, color, national origin, age, disability, sex, sexual orientation, or gender identity.            Thank you!     Thank you for choosing [facility]  for your care. Our goal is always to provide you with excellent care. Hearing back from our patients is one way we can continue to improve our services. Please take a few minutes to complete the written survey that you may receive in the mail after your visit with us. Thank you!             Your Updated Medication List - Protect others around you: Learn how to safely use, store and throw away your medicines at www.disposemymeds.org.          This list is accurate as of: 12/7/17  8:14 PM.   Always use your most recent med list.                   Brand Name Dispense Instructions for use Diagnosis    albuterol 108 (90 BASE) MCG/ACT Inhaler    PROAIR HFA/PROVENTIL HFA/VENTOLIN HFA    1 Inhaler    Inhale 2 puffs every 4-6 hours as needed for cough, wheezing, or shortness of breath    Acute bronchitis treated with antibiotics in the past 60 days       atorvastatin 10 MG tablet    LIPITOR    90 tablet    Take 1 tablet (10 mg) by mouth daily    Hypercholesteremia       azithromycin 250 MG tablet    ZITHROMAX    6 tablet    2 tablets the first day, then 1 tablet daily for the next 4 days    URI with cough and congestion       B-D U/F 31G X 8 MM   Generic drug:  insulin pen needle     300 each    USE ONE  FIVE TIMES DAILY (TO  BE  USED  WITH  INSULIN  PEN,  3  MEALS  AND  AT  BEDTIME)    Diabetes mellitus type 1 (H)       BASAGLAR 100 UNIT/ML injection     27 mL    Inject 26 Units Subcutaneous daily    DM w/o complication type I (H)       blood glucose monitoring test strip    no brand specified    400 strip    Test 4-5 times daily    Type 1 diabetes, HbA1c goal < 7% (H)       econazole nitrate 1 % cream     60 g    Apply topically 2 times daily    Diabetes mellitus type 1 (H), Erythrasma       guaiFENesin-codeine 100-10 MG/5ML Soln solution    ROBITUSSIN AC    120 mL    Take 5-10 mLs by mouth every 6 hours as needed for cough    Acute bronchitis treated with antibiotics in the past 60 days       * insulin lispro 100 UNIT/ML injection    HumaLOG KWIKpen    30 mL    INJECT 5-10 UNITS SUBCUTANEOUSLY BEFORE MEALS    DM w/o complication type I (H)       * HumaLOG KWIKpen 100 UNIT/ML injection   Generic drug:  insulin lispro     15 mL    INJECT 5-10 UNITS SUBCUTANEOUSLY BEFORE MEAL(S)    DM w/o complication type I (H)       ipratropium - albuterol 0.5 mg/2.5 mg/3 mL 0.5-2.5 (3) MG/3ML neb solution    DUONEB    3 mL    Take 1 vial (3 mLs) by nebulization once for 1 dose    Cough       LANCETS REGULAR Misc     360  each    1 Device 4 times daily.    Diabetes mellitus, type 1       mometasone 0.1 % solution (lotion)    ELOCON    60 mL    Apply twice daily    SBD (seborrhoeic dermatitis)       predniSONE 20 MG tablet    DELTASONE    5 tablet    Take 1 tablet (20 mg) by mouth daily for 5 days    Acute bronchitis treated with antibiotics in the past 60 days       sildenafil 100 MG tablet    VIAGRA    30 tablet    Take 1 tablet (100 mg) by mouth daily as needed for erectile dysfunction    Diabetes mellitus type 1 (H)       tadalafil 20 MG tablet    CIALIS    20 tablet    Take 1 tablet by mouth. As needed    Erythrasma       * Notice:  This list has 2 medication(s) that are the same as other medications prescribed for you. Read the directions carefully, and ask your doctor or other care provider to review them with you.

## 2017-12-08 NOTE — NURSING NOTE
"Chief Complaint   Patient presents with     Cough     Pt c/o cough on and off for a month.        Initial /78 (BP Location: Left arm, Patient Position: Chair, Cuff Size: Adult Large)  Pulse 80  Temp 98.4  F (36.9  C) (Oral)  Wt 210 lb (95.3 kg)  SpO2 98%  BMI 30.13 kg/m2 Estimated body mass index is 30.13 kg/(m^2) as calculated from the following:    Height as of 7/26/17: 5' 10\" (1.778 m).    Weight as of this encounter: 210 lb (95.3 kg).  Medication Reconciliation: complete     Maranda Barroso CMA (AAMA)      "
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
## 2017-12-08 NOTE — NURSING NOTE
The following nebulizer treatment was given:     MEDICATION: Duoneb  : [employer]  LOT #: [id number]  EXPIRATION DATE:  01/2019  NDC # 2482-3722-02    Maranda Barroso CMA ([facility])

## 2018-02-26 ENCOUNTER — TRANSFERRED RECORDS (OUTPATIENT)
Dept: HEALTH INFORMATION MANAGEMENT | Facility: CLINIC | Age: 48
End: 2018-02-26

## 2018-03-04 ENCOUNTER — TRANSFERRED RECORDS (OUTPATIENT)
Dept: HEALTH INFORMATION MANAGEMENT | Facility: CLINIC | Age: 48
End: 2018-03-04

## 2018-07-12 ASSESSMENT — MIFFLIN-ST. JEOR
SCORE: 1804.73
SCORE: 1804.73

## 2018-07-13 ENCOUNTER — ANESTHESIA - HEALTHEAST (OUTPATIENT)
Dept: SURGERY | Facility: AMBULATORY SURGERY CENTER | Age: 48
End: 2018-07-13

## 2018-07-16 ENCOUNTER — HOSPITAL ENCOUNTER (OUTPATIENT)
Dept: SURGERY | Facility: AMBULATORY SURGERY CENTER | Age: 48
Discharge: HOME | End: 2018-07-16
Attending: ORTHOPAEDIC SURGERY | Admitting: ORTHOPAEDIC SURGERY
Payer: COMMERCIAL

## 2018-07-16 ENCOUNTER — SURGERY - HEALTHEAST (OUTPATIENT)
Dept: SURGERY | Facility: AMBULATORY SURGERY CENTER | Age: 48
End: 2018-07-16

## 2018-07-16 LAB — GLUCOSE BLDC GLUCOMTR-MCNC: 219 MG/DL (ref 70–125)

## 2018-07-21 DIAGNOSIS — E10.9 DIABETES MELLITUS TYPE 1 (H): ICD-10-CM

## 2018-07-23 RX ORDER — PEN NEEDLE, DIABETIC 31 GX5/16"
NEEDLE, DISPOSABLE MISCELLANEOUS
Qty: 300 EACH | Refills: 1 | Status: SHIPPED | OUTPATIENT
Start: 2018-07-23 | End: 2018-08-29

## 2018-08-03 DIAGNOSIS — E10.9 DM W/O COMPLICATION TYPE I (H): ICD-10-CM

## 2018-08-03 RX ORDER — INSULIN GLARGINE 100 [IU]/ML
INJECTION, SOLUTION SUBCUTANEOUS
Qty: 15 ML | Refills: 1 | Status: SHIPPED | OUTPATIENT
Start: 2018-08-03 | End: 2018-08-29

## 2018-08-03 NOTE — TELEPHONE ENCOUNTER
Last Office Visit: 7/26/17  Future Appt: 10/2/18    Patient scheduled follow up with writer on 10/2/18. Patient also placed on wait list.    Refills completed.    Anju Barrios LPN  Adult Endocrinology  [facility]

## 2018-08-22 ENCOUNTER — TELEPHONE (OUTPATIENT)
Dept: ENDOCRINOLOGY | Facility: CLINIC | Age: 48
End: 2018-08-22

## 2018-08-22 NOTE — TELEPHONE ENCOUNTER
8/28/18 appt canceled due to provider family emergency.    Patient informed and notified clinic will contact him to reschedule.    Anju Barrios LPN  Adult Endocrinology  [facility]

## 2018-08-29 ENCOUNTER — TELEPHONE (OUTPATIENT)
Dept: ENDOCRINOLOGY | Facility: CLINIC | Age: 48
End: 2018-08-29

## 2018-08-29 ENCOUNTER — OFFICE VISIT (OUTPATIENT)
Dept: ENDOCRINOLOGY | Facility: CLINIC | Age: 48
End: 2018-08-29
Payer: COMMERCIAL

## 2018-08-29 VITALS
HEART RATE: 81 BPM | DIASTOLIC BLOOD PRESSURE: 87 MMHG | OXYGEN SATURATION: 98 % | WEIGHT: 214.51 LBS | BODY MASS INDEX: 31.77 KG/M2 | HEIGHT: 69 IN | SYSTOLIC BLOOD PRESSURE: 140 MMHG

## 2018-08-29 DIAGNOSIS — N50.812 TESTICULAR PAIN, LEFT: ICD-10-CM

## 2018-08-29 DIAGNOSIS — R03.0 ELEVATED BLOOD PRESSURE READING WITHOUT DIAGNOSIS OF HYPERTENSION: ICD-10-CM

## 2018-08-29 DIAGNOSIS — E10.9 DM W/O COMPLICATION TYPE I (H): Primary | ICD-10-CM

## 2018-08-29 DIAGNOSIS — E78.00 HYPERCHOLESTEREMIA: ICD-10-CM

## 2018-08-29 DIAGNOSIS — E10.9 DM W/O COMPLICATION TYPE I (H): ICD-10-CM

## 2018-08-29 LAB
ALBUMIN SERPL-MCNC: 3.4 G/DL (ref 3.4–5)
ALP SERPL-CCNC: 70 U/L (ref 40–150)
ALT SERPL W P-5'-P-CCNC: 22 U/L (ref 0–70)
ANION GAP SERPL CALCULATED.3IONS-SCNC: 4 MMOL/L (ref 3–14)
AST SERPL W P-5'-P-CCNC: 17 U/L (ref 0–45)
BILIRUB SERPL-MCNC: 0.7 MG/DL (ref 0.2–1.3)
BUN SERPL-MCNC: 18 MG/DL (ref 7–30)
CALCIUM SERPL-MCNC: 8.7 MG/DL (ref 8.5–10.1)
CHLORIDE SERPL-SCNC: 107 MMOL/L (ref 94–109)
CHOLEST SERPL-MCNC: 146 MG/DL
CO2 SERPL-SCNC: 30 MMOL/L (ref 20–32)
CREAT SERPL-MCNC: 0.97 MG/DL (ref 0.66–1.25)
CREAT UR-MCNC: 181 MG/DL
GFR SERPL CREATININE-BSD FRML MDRD: 83 ML/MIN/1.7M2
GLUCOSE SERPL-MCNC: 202 MG/DL (ref 70–99)
HBA1C MFR BLD: 7.2 % (ref 0–5.7)
HCT VFR BLD AUTO: 43.3 % (ref 40–53)
HDLC SERPL-MCNC: 51 MG/DL
LDLC SERPL CALC-MCNC: 85 MG/DL
MICROALBUMIN UR-MCNC: 9 MG/L
MICROALBUMIN/CREAT UR: 4.92 MG/G CR (ref 0–17)
NONHDLC SERPL-MCNC: 95 MG/DL
POTASSIUM SERPL-SCNC: 4.1 MMOL/L (ref 3.4–5.3)
PROT SERPL-MCNC: 6.8 G/DL (ref 6.8–8.8)
SODIUM SERPL-SCNC: 141 MMOL/L (ref 133–144)
TRIGL SERPL-MCNC: 49 MG/DL

## 2018-08-29 PROCEDURE — 85014 HEMATOCRIT: CPT | Performed by: INTERNAL MEDICINE

## 2018-08-29 PROCEDURE — 80061 LIPID PANEL: CPT | Performed by: INTERNAL MEDICINE

## 2018-08-29 PROCEDURE — 83036 HEMOGLOBIN GLYCOSYLATED A1C: CPT | Performed by: INTERNAL MEDICINE

## 2018-08-29 PROCEDURE — 82043 UR ALBUMIN QUANTITATIVE: CPT | Performed by: INTERNAL MEDICINE

## 2018-08-29 PROCEDURE — 99214 OFFICE O/P EST MOD 30 MIN: CPT | Performed by: INTERNAL MEDICINE

## 2018-08-29 PROCEDURE — 36415 COLL VENOUS BLD VENIPUNCTURE: CPT | Performed by: INTERNAL MEDICINE

## 2018-08-29 PROCEDURE — 80053 COMPREHEN METABOLIC PANEL: CPT | Performed by: INTERNAL MEDICINE

## 2018-08-29 RX ORDER — PROCHLORPERAZINE 25 MG/1
1 SUPPOSITORY RECTAL
Qty: 1 EACH | Refills: 1 | Status: SHIPPED | OUTPATIENT
Start: 2018-08-29 | End: 2018-08-29

## 2018-08-29 RX ORDER — ATORVASTATIN CALCIUM 10 MG/1
10 TABLET, FILM COATED ORAL DAILY
Qty: 90 TABLET | Refills: 3 | Status: SHIPPED | OUTPATIENT
Start: 2018-08-29 | End: 2019-06-27

## 2018-08-29 RX ORDER — PROCHLORPERAZINE 25 MG/1
1 SUPPOSITORY RECTAL SEE ADMIN INSTRUCTIONS
Qty: 9 EACH | Refills: 3 | Status: SHIPPED | OUTPATIENT
Start: 2018-08-29 | End: 2019-08-28

## 2018-08-29 RX ORDER — PROCHLORPERAZINE 25 MG/1
1 SUPPOSITORY RECTAL
Qty: 12 EACH | Refills: 3 | Status: SHIPPED | OUTPATIENT
Start: 2018-08-29 | End: 2018-08-29

## 2018-08-29 RX ORDER — PROCHLORPERAZINE 25 MG/1
1 SUPPOSITORY RECTAL CONTINUOUS
Qty: 1 DEVICE | Refills: 0 | Status: SHIPPED | OUTPATIENT
Start: 2018-08-29 | End: 2019-08-28

## 2018-08-29 RX ORDER — PROCHLORPERAZINE 25 MG/1
1 SUPPOSITORY RECTAL
Qty: 1 EACH | Refills: 1 | Status: SHIPPED | OUTPATIENT
Start: 2018-08-29 | End: 2019-08-30

## 2018-08-29 RX ORDER — PROCHLORPERAZINE 25 MG/1
1 SUPPOSITORY RECTAL CONTINUOUS
Qty: 1 DEVICE | Refills: 0 | Status: SHIPPED | OUTPATIENT
Start: 2018-08-29 | End: 2018-08-29

## 2018-08-29 RX ORDER — INSULIN GLARGINE 100 [IU]/ML
26 INJECTION, SOLUTION SUBCUTANEOUS DAILY
Qty: 30 ML | Refills: 3 | Status: SHIPPED | OUTPATIENT
Start: 2018-08-29 | End: 2019-08-28

## 2018-08-29 NOTE — TELEPHONE ENCOUNTER
I sent a prescription through [facility] mail order. Please let him know. He can check BP at local supermarkets and let us know if it's elevated.

## 2018-08-29 NOTE — NURSING NOTE
"Tl Sands's goals for this visit include: DM follow up  He requests these members of his care team be copied on today's visit information: Yes    PCP: Cathryn Leung    Referring Provider:  No referring provider defined for this encounter.    /85  Pulse 65  Ht 1.756 m (5' 9.13\")  Wt 97.3 kg (214 lb 8.1 oz)  SpO2 98%  BMI 31.55 kg/m2    Do you need any medication refills at today's visit? Yes    "

## 2018-08-29 NOTE — LETTER
8/29/2018         RE: Tl Sands  [address]  [place] [address]        Dear Colleague,    Thank you for referring your patient, Tl Sands, to the [facility]. Please see a copy of my visit note below.      Tl Sands is a 47 year old man seen in follow-up for type 1 diabetes, diagnosed in 2005.    He has not known diabetes complications and his average hemoglobin A1c over the recent years has been around 7 to 7.5. Today, it was 7.2, stable since his last visit here.     He forgot to bring his meter.  Reports checking his blood sugar twice a day, always fasting and either before or after dinner.  Sometimes, he checks his blood sugar 30 minutes after having something to eat.  Current insulin regimen is 26 U Basaglar in am, 1 U Novolog per 10 grams CHO and a correction scale of 1 U per 25 mg above 120. Average dose of Novolog for meals is 4-6 U.   He has lunch at work and, sometimes, he forgets to take Novolog.  A couple of times a month, he experiences hypoglycemic episodes, mild. Most of them occur during the night or in the evening, after playing golf.    Currently, he does not have a primary care provider.  He is aware he needs screening colonoscopy, especially since he has a family history of colon cancer.  Since yesterday morning, he developed some left testicular pain.  The pain has been intermittent, present mainly when moving.  Today, it is mild.  He denies any trauma but he remembers being diagnosed with testicular varicocele in the past.    He continues to struggle with low back pain and he is contemplating surgery.  His weight is up 4 pounds since his last visit here, most likely a consequence of decreased exercise.  Currently, he only plays golf, once a week.  He plans to go back to gym once his back pain lessens.    Diabetes complications:  Last eye exam 2017 (doesn't remember the date)- no DR   No h/o proteinuria   No numbness or tingling sensation    He denies prior episodes of loss of consciousness due to hypoglycemia.  The lowest blood glucose he remembers was 40.  He is able to recognize the hypoglycemic episodes.  In the 60s, he gets sweaty, weak, and he develops shortness of breath.  He has glucagon at home and his family members know how to use it.  He used to do kickboxing, cardio. He is coaching skating wintertime.     He has been compliant in taking atorvastatin at a dose of 10 mg 2-3 times a week.      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   Type 1 diabetes   Tinea pedis   Finger fracture     PSM:  B/L inguinal hernia repair   B/L arthroscopic knee surgery      Prescription Medications as of 8/29/2018             albuterol (PROAIR HFA/PROVENTIL HFA/VENTOLIN HFA) 108 (90 BASE) MCG/ACT Inhaler Inhale 2 puffs every 4-6 hours as needed for cough, wheezing, or shortness of breath    atorvastatin (LIPITOR) 10 MG tablet Take 1 tablet (10 mg) by mouth daily    azithromycin (ZITHROMAX) 250 MG tablet 2 tablets the first day, then 1 tablet daily for the next 4 days    B-D U/F 31G X 8 MM insulin pen needle USE ONE  FIVE TIMES DAILY (TO  BE  USED  WITH  INSULIN  PEN,  3  MEALS  AND  AT  BEDTIME)    BASAGLAR 100 UNIT/ML injection INJECT 26 UNITS SUBCUTANEOUSLY ONCE DAILY    econazole nitrate 1 % cream Apply topically 2 times daily    glucose blood VI test strips strip Test 4-5 times daily    guaiFENesin-codeine (ROBITUSSIN AC) 100-10 MG/5ML SOLN solution Take 5-10 mLs by mouth every 6 hours as needed for cough    insulin lispro (HUMALOG KWIKPEN) 100 UNIT/ML injection INJECT 5-10 UNITS SUBCUTANEOUSLY BEFORE MEAL(S)    insulin lispro (HUMALOG KWIKPEN) 100 UNIT/ML injection INJECT 5-10 UNITS SUBCUTANEOUSLY BEFORE MEALS    ipratropium - albuterol 0.5 mg/2.5 mg/3 mL (DUONEB) 0.5-2.5 (3) MG/3ML neb solution Take 1 vial (3 mLs) by nebulization once for 1 dose    LANCETS REGULAR MISC 1 Device 4 times daily.    mometasone (ELOCON) 0.1 % solution (lotion) APPLY  "SOLUTION TOPICALLY TWICE DAILY    sildenafil (VIAGRA) 100 MG tablet Take 1 tablet (100 mg) by mouth daily as needed for erectile dysfunction    tadalafil (CIALIS) 20 MG tablet Take 1 tablet by mouth. As needed        HABITS:  He does not use tobacco or alcohol.      SOCIAL HISTORY:  He is  and lives with his wife and 2 of his 3 children in [place]. 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      Family history  Maternal grandfather - type 1 diabetes. Paternal uncle also has type 1 diabetes. No f/h of thyroid disease. Paternal uncle - colon cancer.     ROS:  Occasional R hip pain   LBP   Right temporomandibular joint pain -follows up with his dentist  10 point ROS neg other than above    PHYSICAL EXAMINATION:   Wt Readings from Last 10 Encounters:   08/29/18 97.3 kg (214 lb 8.1 oz)   12/07/17 95.3 kg (210 lb)   11/02/17 95.7 kg (211 lb)   07/26/17 95.1 kg (209 lb 10.5 oz)   09/13/16 94.9 kg (209 lb 3.5 oz)   03/15/16 90.5 kg (199 lb 9 oz)   09/23/15 91.7 kg (202 lb 3.2 oz)   03/17/15 91.6 kg (201 lb 15.1 oz)   12/03/14 92.9 kg (204 lb 14.4 oz)   05/07/14 93.4 kg (206 lb)     /85  Pulse 65  Ht 1.756 m (5' 9.13\")  Wt 97.3 kg (214 lb 8.1 oz)  SpO2 98%  BMI 31.55 kg/m2    BP Readings from Last 6 Encounters:   08/29/18 140/87   12/07/17 125/78   11/02/17 132/76   07/26/17 105/70   09/13/16 104/70   03/15/16 126/72     GENERAL:  A healthy-appearing man.   EYES:  Extraocular movements are intact.  Sclerae are clear.  No retinopathy appreciated.   NECK:  No goiter, bruit or adenopathy.   CHEST:  Clear to auscultation.   CARDIOVASCULAR:  Regular rate and rhythm.  No murmur.   ABDOMEN:  Soft.  No hepatomegaly or lipohypertrophy.   EXTREMITIES:  No pretibial edema. Ankle jerks absent bilaterally.    GI: Abdomen soft, nontender, nondistended, positive bowel sounds   Musculoskeletal: Normal tone and muscle mass  SKIN: Tanned; psoriatic scalp lesions; no lipodystrophy at the site of insulin injections (he " mainly injects the right posterior buttock).  Feet: Sensation intact to monofilament testing, skin intact mild calluses in the heel area  Genital exam: Left testis larger than the right testis, approximately 6 cm diameter, with diffuse tenderness on palpation,?  Varicose veins      LABORATORY TESTS:   I reviewed prior lab results documented in Epic.   Lab Results   Component Value Date    A1C 7.2 08/29/2018    A1C 7.1 (H) 07/26/2017    A1C 7.6 09/13/2016    A1C 7.5 (H) 03/15/2016    A1C 7.9 (H) 09/23/2015       Hemoglobin   Date Value Ref Range Status   12/03/2014 14.9 13.3 - 17.7 g/dL Final     Hematocrit   Date Value Ref Range Status   07/26/2017 44.2 40.0 - 53.0 % Final     Cholesterol   Date Value Ref Range Status   07/26/2017 158 <200 mg/dL Final     Cholesterol/HDL Ratio   Date Value Ref Range Status   03/17/2015 3.2 0.0 - 5.0 Final     HDL Cholesterol   Date Value Ref Range Status   07/26/2017 60 >39 mg/dL Final     LDL Cholesterol Calculated   Date Value Ref Range Status   07/26/2017 86 <100 mg/dL Final     Comment:     Desirable:       <100 mg/dl     VLDL-Cholesterol   Date Value Ref Range Status   03/17/2015 23 0 - 30 mg/dL Final     Triglycerides   Date Value Ref Range Status   07/26/2017 59 <150 mg/dL Final     Comment:     Fasting specimen     Albumin Urine mg/L   Date Value Ref Range Status   07/26/2017 11 mg/L Final     TSH   Date Value Ref Range Status   07/26/2017 1.37 0.40 - 4.00 mU/L Final         Last Basic Metabolic Panel:    Sodium   Date Value Ref Range Status   07/26/2017 142 133 - 144 mmol/L Final     Potassium   Date Value Ref Range Status   07/26/2017 Unsatisfactory specimen - hemolyzed 3.4 - 5.3 mmol/L Final     Chloride   Date Value Ref Range Status   07/26/2017 108 94 - 109 mmol/L Final     Calcium   Date Value Ref Range Status   07/26/2017 8.6 8.5 - 10.1 mg/dL Final     Carbon Dioxide   Date Value Ref Range Status   07/26/2017 29 20 - 32 mmol/L Final     Urea Nitrogen   Date Value Ref  Range Status   07/26/2017 18 7 - 30 mg/dL Final     Creatinine   Date Value Ref Range Status   07/26/2017 0.84 0.66 - 1.25 mg/dL Final     GFR Estimate   Date Value Ref Range Status   07/26/2017 >90  Non  GFR Calc   >60 mL/min/1.7m2 Final     Glucose   Date Value Ref Range Status   07/26/2017 86 70 - 99 mg/dL Final     Comment:     Fasting specimen       AST   Date Value Ref Range Status   07/26/2017 20 0 - 45 U/L Final     ALT   Date Value Ref Range Status   07/26/2017 26 0 - 70 U/L Final     Albumin   Date Value Ref Range Status   07/26/2017 3.6 3.4 - 5.0 g/dL Final         AST   Date Value Ref Range Status   07/26/2017 20 0 - 45 U/L Final     ALT   Date Value Ref Range Status   07/26/2017 26 0 - 70 U/L Final     Albumin   Date Value Ref Range Status   07/26/2017 3.6 3.4 - 5.0 g/dL Final       Assessment and plan:    1.  Type 1 diabetes mellitus, fairly well controlled, with no known diabetes complications.  The fact that he experiences hypoglycemic episodes during the night might be  a consequence of taking too much Basaglar.  Unfortunately, I do not have the blood glucose and insulin records to guide me on how to change his insulin regimen.  A glucose sensor might be helpful.  I discussed with the patient the use and the benefits of both DEXCOM G6 and FreeStyle annabelle sensors.  He is going to check his insurance coverage and let us know if coverage is available.  Recommendations:  Check blood glucose always before meals and at bedtime  Avoid checking the blood sugar within 2 hours after having something to eat  Always take NovoLog before meals and snacks, unless they are followed by physical exercise  Follow-up urine microalbumin, CMP, hematocrit  Schedule a comprehensive eye exam.     2. Hypercholesterolemia, on atorvastatin taken 2-3 times a week.  Follow-up lipid panel.     3.  Elevated blood pressure.  The patient attributes this to the stress of forgetting his meter this morning.  We are  going to reevaluate at his follow-up appointment, as his blood pressure has never been elevated before.  We are also going to recheck his blood pressure when he is scheduled for lab work.    4. Mild testicular pain, present mainly with movement..   If the pain is still persistent in 2-3 days, instructed the patient to contact us, so we can refer him to urology.    5. Health maintenance   Advised the patient to establish primary care.     Orders Placed This Encounter   Procedures     Albumin Random Urine Quantitative with Creat Ratio     Comprehensive metabolic panel     Hematocrit     Lipid panel reflex to direct LDL Fasting        Again, thank you for allowing me to participate in the care of your patient.        Sincerely,        Rupali Resendiz MD

## 2018-08-29 NOTE — TELEPHONE ENCOUNTER
Pt [facility] Dexcom G6 sensor Requested pt call clinic in one week if he has not heard from pharmacy regarding shipment or coverage. Pt also advised he can go to a local pharmacy to have BP checked. If elevated, please call clinic. Pt verbalized understanding of al the above.     Dulce Jose RN, BSN, CDE   [facility]

## 2018-08-29 NOTE — TELEPHONE ENCOUNTER
Patient contacted clinic to let Dr. Resendiz know that his insurance company does cover Dexcom G6. Patient wanting to proceed with process for insurance verification.    Patient also notes that he was actually fasting today and he did labs on his way out from appointment.     Patient wanting to know if he can hold off on blood pressure check at this time until next visit.    Will send to Dr. Resendiz to review.     Will also send to diabetes educator, Dulce, to start Dexcom process.      Melinda Singh RN  Endocrine Care Coordinator  [facility]

## 2018-08-29 NOTE — MR AVS SNAPSHOT
After Visit Summary   8/29/2018    Tl Sands    MRN: [id number]           Patient Information     Date Of Birth          1970        Visit Information        Provider Department      8/29/2018 7:15 AM Rupali Resendiz MD; MG ENDO NURSE [facility]        Today's Diagnoses     DM w/o complication type I (H)    -  1    Hypercholesteremia          Care Instructions    Glucose sensors:  DEXCOM G6   FreeStyle Antonieta    [facility]-Department of Endocrinology  Dulce Jose RN, Diabetes Educator: [phone]  Clinic Nurses Melinda Rene: [phone]  Clinic Fax: [phone]  On-Call Endocrine at [facility] (after hours/weekends): [phone] option 4  Scheduling Line: [phone]    Appointment Reminders:  * Please bring meter with for staff to download  * If you are due ONLY for an A1C, it is scheduled with the nurse and will be done in clinic. You do not need to schedule a lab appointment. Fasting is not required for an A1C.  * Refill request should be submitted to your pharmacy. They will contact clinic for approval.                Follow-ups after your visit        Follow-up notes from your care team     Return in about 1 year (around 8/29/2019) for fasting labs; nurse apt to check BP .      Your next 10 appointments already scheduled     Aug 28, 2019  7:15 AM CDT   Return Visit with Rupali Resendiz MD, MG ENDO NURSE   [facility] ([facility])    [address] [place] [id number] [phone]              Future tests that were ordered for you today     Open Future Orders        Priority Expected Expires Ordered    Albumin Random Urine Quantitative with Creat Ratio Routine 8/29/2018 8/29/2019 8/29/2018    Comprehensive metabolic panel Routine 8/29/2018 8/29/2019 8/29/2018    Hematocrit Routine 8/29/2018 8/29/2019 8/29/2018    Lipid panel reflex to direct LDL Fasting Routine  "8/29/2018 8/29/2019 8/29/2018            Who to contact     If you have questions or need follow up information about today's clinic visit or your schedule please contact [facility] directly at [phone].  Normal or non-critical lab and imaging results will be communicated to you by Susohart, letter or phone within 4 business days after the clinic has received the results. If you do not hear from us within 7 days, please contact the clinic through Susohart or phone. If you have a critical or abnormal lab result, we will notify you by phone as soon as possible.  Submit refill requests through Scandid or call your pharmacy and they will forward the refill request to us. Please allow 3 business days for your refill to be completed.          Additional Information About Your Visit        Scandid Information     Scandid gives you secure access to your electronic health record. If you see a primary care provider, you can also send messages to your care team and make appointments. If you have questions, please call your primary care clinic.  If you do not have a primary care provider, please call [phone] and they will assist you.      Scandid is an electronic gateway that provides easy, online access to your medical records. With Scandid, you can request a clinic appointment, read your test results, renew a prescription or communicate with your care team.     To access your existing account, please contact your [facility] or call [phone] for assistance.        Care EveryWhere ID     This is your Care EveryWhere ID. This could be used by other organizations to access your [place] medical records  [id number]        Your Vitals Were     Pulse Height Pulse Oximetry BMI (Body Mass Index)          81 1.756 m (5' 9.13\") 98% 31.55 kg/m2         Blood Pressure from Last 3 Encounters:   08/29/18 140/87   12/07/17 125/78   11/02/17 132/76    Weight from Last 3 Encounters: "   08/29/18 97.3 kg (214 lb 8.1 oz)   12/07/17 95.3 kg (210 lb)   11/02/17 95.7 kg (211 lb)              We Performed the Following     Hemoglobin A1c POCT          Today's Medication Changes          These changes are accurate as of 8/29/18  8:08 AM.  If you have any questions, ask your nurse or doctor.               These medicines have changed or have updated prescriptions.        Dose/Directions    BASAGLAR 100 UNIT/ML injection   This may have changed:  See the new instructions.   Used for:  DM w/o complication type I (H)   Changed by:  Rupali Resendiz MD        Dose:  26 Units   Inject 26 Units Subcutaneous daily   Quantity:  30 mL   Refills:  3       * insulin lispro 100 UNIT/ML injection   Commonly known as:  HumaLOG KWIKpen   This may have changed:  Another medication with the same name was changed. Make sure you understand how and when to take each.   Used for:  DM w/o complication type I (H)   Changed by:  Rupali Resendiz MD        INJECT 5-10 UNITS SUBCUTANEOUSLY BEFORE MEALS   Quantity:  30 mL   Refills:  3       * insulin lispro 100 UNIT/ML injection   Commonly known as:  HumaLOG KWIKpen   This may have changed:  additional instructions   Used for:  DM w/o complication type I (H)   Changed by:  Rupali Resendiz MD        INJECT 5-10 UNITS SUBCUTANEOUSLY BEFORE MEAL(S), total daily dose up to 30 U   Quantity:  30 mL   Refills:  3       * Notice:  This list has 2 medication(s) that are the same as other medications prescribed for you. Read the directions carefully, and ask your doctor or other care provider to review them with you.         Where to get your medicines      These medications were sent to [facility] [address] [place] [address] [address]  [address] [facility] [address]     Phone:  [phone]     atorvastatin 10 MG tablet    BASAGLAR 100 UNIT/ML injection    insulin lispro 100 UNIT/ML injection    insulin pen needle 31G X 8 MM                 Primary Care Provider Office Phone # Fax #    Cathryn JANET Leung [phone] [address] [address]        Equal Access to Services     BHARATI CASTANEDA : Hadtam aad ku hadyolette Martin, waaxda luqadaha, qaybta kaalmada carolyn, avinash sebastian laSamuelebonie marino. So [facility] [phone].    ATENCIÓN: Si habla español, tiene a graham disposición servicios gratuitos de asistencia lingüística. Llame al [phone].    We comply with applicable federal civil rights laws and [place] laws. We do not discriminate on the basis of race, color, national origin, age, disability, sex, sexual orientation, or gender identity.            Thank you!     Thank you for choosing [facility]  for your care. Our goal is always to provide you with excellent care. Hearing back from our patients is one way we can continue to improve our services. Please take a few minutes to complete the written survey that you may receive in the mail after your visit with us. Thank you!             Your Updated Medication List - Protect others around you: Learn how to safely use, store and throw away your medicines at www.disposemymeds.org.          This list is accurate as of 8/29/18  8:08 AM.  Always use your most recent med list.                   Brand Name Dispense Instructions for use Diagnosis    atorvastatin 10 MG tablet    LIPITOR    90 tablet    Take 1 tablet (10 mg) by mouth daily    Hypercholesteremia       BASAGLAR 100 UNIT/ML injection     30 mL    Inject 26 Units Subcutaneous daily    DM w/o complication type I (H)       blood glucose monitoring test strip    no brand specified    400 strip    Test 4-5 times daily    Type 1 diabetes, HbA1c goal < 7% (H)       econazole nitrate 1 % cream     60 g    Apply topically 2 times daily    Diabetes mellitus type 1 (H), Erythrasma       * insulin lispro 100 UNIT/ML injection    HumaLOG KWIKpen    30 mL    INJECT 5-10 UNITS SUBCUTANEOUSLY  BEFORE MEALS    DM w/o complication type I (H)       * insulin lispro 100 UNIT/ML injection    HumaLOG KWIKpen    30 mL    INJECT 5-10 UNITS SUBCUTANEOUSLY BEFORE MEAL(S), total daily dose up to 30 U    DM w/o complication type I (H)       insulin pen needle 31G X 8 MM    B-D U/F    300 each    USE ONE  FIVE TIMES DAILY (TO  BE  USED  WITH  INSULIN  PEN,  3  MEALS  AND  AT  BEDTIME)    DM w/o complication type I (H)       ipratropium - albuterol 0.5 mg/2.5 mg/3 mL 0.5-2.5 (3) MG/3ML neb solution    DUONEB    3 mL    Take 1 vial (3 mLs) by nebulization once for 1 dose    Cough       LANCETS REGULAR Misc     360 each    1 Device 4 times daily.    Diabetes mellitus, type 1       mometasone 0.1 % solution (lotion)    ELOCON    60 mL    APPLY SOLUTION TOPICALLY TWICE DAILY    Seborrheic dermatitis       sildenafil 100 MG tablet    VIAGRA    30 tablet    Take 1 tablet (100 mg) by mouth daily as needed for erectile dysfunction    Diabetes mellitus type 1 (H)       tadalafil 20 MG tablet    CIALIS    20 tablet    Take 1 tablet by mouth. As needed    Erythrasma       * Notice:  This list has 2 medication(s) that are the same as other medications prescribed for you. Read the directions carefully, and ask your doctor or other care provider to review them with you.

## 2018-08-29 NOTE — PROGRESS NOTES
Tl Sands is a 47 year old man seen in follow-up for type 1 diabetes, diagnosed in 2005.    He has not known diabetes complications and his average hemoglobin A1c over the recent years has been around 7 to 7.5. Today, it was 7.2, stable since his last visit here.     He forgot to bring his meter.  Reports checking his blood sugar twice a day, always fasting and either before or after dinner.  Sometimes, he checks his blood sugar 30 minutes after having something to eat.  Current insulin regimen is 26 U Basaglar in am, 1 U Novolog per 10 grams CHO and a correction scale of 1 U per 25 mg above 120. Average dose of Novolog for meals is 4-6 U.   He has lunch at work and, sometimes, he forgets to take Novolog.  A couple of times a month, he experiences hypoglycemic episodes, mild. Most of them occur during the night or in the evening, after playing golf.    Currently, he does not have a primary care provider.  He is aware he needs screening colonoscopy, especially since he has a family history of colon cancer.  Since yesterday morning, he developed some left testicular pain.  The pain has been intermittent, present mainly when moving.  Today, it is mild.  He denies any trauma but he remembers being diagnosed with testicular varicocele in the past.    He continues to struggle with low back pain and he is contemplating surgery.  His weight is up 4 pounds since his last visit here, most likely a consequence of decreased exercise.  Currently, he only plays golf, once a week.  He plans to go back to gym once his back pain lessens.    Diabetes complications:  Last eye exam 2017 (doesn't remember the date)- no DR   No h/o proteinuria   No numbness or tingling sensation   He denies prior episodes of loss of consciousness due to hypoglycemia.  The lowest blood glucose he remembers was 40.  He is able to recognize the hypoglycemic episodes.  In the 60s, he gets sweaty, weak, and he develops shortness of breath.  He has  glucagon at home and his family members know how to use it.  He used to do kickboxing, cardio. 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     He has been compliant in taking atorvastatin at a dose of 10 mg 2-3 times a week.      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   Type 1 diabetes   Tinea pedis   Finger fracture     PSM:  B/L inguinal hernia repair   B/L arthroscopic knee surgery      Prescription Medications as of 8/29/2018             albuterol (PROAIR HFA/PROVENTIL HFA/VENTOLIN HFA) 108 (90 BASE) MCG/ACT Inhaler Inhale 2 puffs every 4-6 hours as needed for cough, wheezing, or shortness of breath    atorvastatin (LIPITOR) 10 MG tablet Take 1 tablet (10 mg) by mouth daily    azithromycin (ZITHROMAX) 250 MG tablet 2 tablets the first day, then 1 tablet daily for the next 4 days    B-D U/F 31G X 8 MM insulin pen needle USE ONE  FIVE TIMES DAILY (TO  BE  USED  WITH  INSULIN  PEN,  3  MEALS  AND  AT  BEDTIME)    BASAGLAR 100 UNIT/ML injection INJECT 26 UNITS SUBCUTANEOUSLY ONCE DAILY    econazole nitrate 1 % cream Apply topically 2 times daily    glucose blood VI test strips strip Test 4-5 times daily    guaiFENesin-codeine (ROBITUSSIN AC) 100-10 MG/5ML SOLN solution Take 5-10 mLs by mouth every 6 hours as needed for cough    insulin lispro (HUMALOG KWIKPEN) 100 UNIT/ML injection INJECT 5-10 UNITS SUBCUTANEOUSLY BEFORE MEAL(S)    insulin lispro (HUMALOG KWIKPEN) 100 UNIT/ML injection INJECT 5-10 UNITS SUBCUTANEOUSLY BEFORE MEALS    ipratropium - albuterol 0.5 mg/2.5 mg/3 mL (DUONEB) 0.5-2.5 (3) MG/3ML neb solution Take 1 vial (3 mLs) by nebulization once for 1 dose    LANCETS REGULAR MISC 1 Device 4 times daily.    mometasone (ELOCON) 0.1 % solution (lotion) APPLY SOLUTION TOPICALLY TWICE DAILY    sildenafil (VIAGRA) 100 MG tablet Take 1 tablet (100 mg) by mouth daily as needed for erectile dysfunction    tadalafil (CIALIS) 20 MG tablet Take 1 tablet by mouth. As needed        HABITS:  He does not use tobacco  "or alcohol.      SOCIAL HISTORY:  He is  and lives with his wife and 2 of his 3 children in [place]. 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      Family history  Maternal grandfather - type 1 diabetes. Paternal uncle also has type 1 diabetes. No f/h of thyroid disease. Paternal uncle - colon cancer.     ROS:  Occasional R hip pain   LBP   Right temporomandibular joint pain -follows up with his dentist  10 point ROS neg other than above    PHYSICAL EXAMINATION:   Wt Readings from Last 10 Encounters:   08/29/18 97.3 kg (214 lb 8.1 oz)   12/07/17 95.3 kg (210 lb)   11/02/17 95.7 kg (211 lb)   07/26/17 95.1 kg (209 lb 10.5 oz)   09/13/16 94.9 kg (209 lb 3.5 oz)   03/15/16 90.5 kg (199 lb 9 oz)   09/23/15 91.7 kg (202 lb 3.2 oz)   03/17/15 91.6 kg (201 lb 15.1 oz)   12/03/14 92.9 kg (204 lb 14.4 oz)   05/07/14 93.4 kg (206 lb)     /85  Pulse 65  Ht 1.756 m (5' 9.13\")  Wt 97.3 kg (214 lb 8.1 oz)  SpO2 98%  BMI 31.55 kg/m2    BP Readings from Last 6 Encounters:   08/29/18 140/87   12/07/17 125/78   11/02/17 132/76   07/26/17 105/70   09/13/16 104/70   03/15/16 126/72     GENERAL:  A healthy-appearing man.   EYES:  Extraocular movements are intact.  Sclerae are clear.  No retinopathy appreciated.   NECK:  No goiter, bruit or adenopathy.   CHEST:  Clear to auscultation.   CARDIOVASCULAR:  Regular rate and rhythm.  No murmur.   ABDOMEN:  Soft.  No hepatomegaly or lipohypertrophy.   EXTREMITIES:  No pretibial edema. Ankle jerks absent bilaterally.    GI: Abdomen soft, nontender, nondistended, positive bowel sounds   Musculoskeletal: Normal tone and muscle mass  SKIN: Tanned; psoriatic scalp lesions; no lipodystrophy at the site of insulin injections (he mainly injects the right posterior buttock).  Feet: Sensation intact to monofilament testing, skin intact mild calluses in the heel area  Genital exam: Left testis larger than the right testis, approximately 6 cm diameter, with diffuse tenderness on " palpation,?  Varicose veins      LABORATORY TESTS:   I reviewed prior lab results documented in Epic.   Lab Results   Component Value Date    A1C 7.2 08/29/2018    A1C 7.1 (H) 07/26/2017    A1C 7.6 09/13/2016    A1C 7.5 (H) 03/15/2016    A1C 7.9 (H) 09/23/2015       Hemoglobin   Date Value Ref Range Status   12/03/2014 14.9 13.3 - 17.7 g/dL Final     Hematocrit   Date Value Ref Range Status   07/26/2017 44.2 40.0 - 53.0 % Final     Cholesterol   Date Value Ref Range Status   07/26/2017 158 <200 mg/dL Final     Cholesterol/HDL Ratio   Date Value Ref Range Status   03/17/2015 3.2 0.0 - 5.0 Final     HDL Cholesterol   Date Value Ref Range Status   07/26/2017 60 >39 mg/dL Final     LDL Cholesterol Calculated   Date Value Ref Range Status   07/26/2017 86 <100 mg/dL Final     Comment:     Desirable:       <100 mg/dl     VLDL-Cholesterol   Date Value Ref Range Status   03/17/2015 23 0 - 30 mg/dL Final     Triglycerides   Date Value Ref Range Status   07/26/2017 59 <150 mg/dL Final     Comment:     Fasting specimen     Albumin Urine mg/L   Date Value Ref Range Status   07/26/2017 11 mg/L Final     TSH   Date Value Ref Range Status   07/26/2017 1.37 0.40 - 4.00 mU/L Final         Last Basic Metabolic Panel:    Sodium   Date Value Ref Range Status   07/26/2017 142 133 - 144 mmol/L Final     Potassium   Date Value Ref Range Status   07/26/2017 Unsatisfactory specimen - hemolyzed 3.4 - 5.3 mmol/L Final     Chloride   Date Value Ref Range Status   07/26/2017 108 94 - 109 mmol/L Final     Calcium   Date Value Ref Range Status   07/26/2017 8.6 8.5 - 10.1 mg/dL Final     Carbon Dioxide   Date Value Ref Range Status   07/26/2017 29 20 - 32 mmol/L Final     Urea Nitrogen   Date Value Ref Range Status   07/26/2017 18 7 - 30 mg/dL Final     Creatinine   Date Value Ref Range Status   07/26/2017 0.84 0.66 - 1.25 mg/dL Final     GFR Estimate   Date Value Ref Range Status   07/26/2017 >90  Non  GFR Calc   >60 mL/min/1.7m2  Final     Glucose   Date Value Ref Range Status   07/26/2017 86 70 - 99 mg/dL Final     Comment:     Fasting specimen       AST   Date Value Ref Range Status   07/26/2017 20 0 - 45 U/L Final     ALT   Date Value Ref Range Status   07/26/2017 26 0 - 70 U/L Final     Albumin   Date Value Ref Range Status   07/26/2017 3.6 3.4 - 5.0 g/dL Final         AST   Date Value Ref Range Status   07/26/2017 20 0 - 45 U/L Final     ALT   Date Value Ref Range Status   07/26/2017 26 0 - 70 U/L Final     Albumin   Date Value Ref Range Status   07/26/2017 3.6 3.4 - 5.0 g/dL Final       Assessment and plan:    1.  Type 1 diabetes mellitus, fairly well controlled, with no known diabetes complications.  The fact that he experiences hypoglycemic episodes during the night might be  a consequence of taking too much Basaglar.  Unfortunately, I do not have the blood glucose and insulin records to guide me on how to change his insulin regimen.  A glucose sensor might be helpful.  I discussed with the patient the use and the benefits of both DEXCOM G6 and FreeStyle annabelle sensors.  He is going to check his insurance coverage and let us know if coverage is available.  Recommendations:  Check blood glucose always before meals and at bedtime  Avoid checking the blood sugar within 2 hours after having something to eat  Always take NovoLog before meals and snacks, unless they are followed by physical exercise  Follow-up urine microalbumin, CMP, hematocrit  Schedule a comprehensive eye exam.     2. Hypercholesterolemia, on atorvastatin taken 2-3 times a week.  Follow-up lipid panel.     3.  Elevated blood pressure.  The patient attributes this to the stress of forgetting his meter this morning.  We are going to reevaluate at his follow-up appointment, as his blood pressure has never been elevated before.  We are also going to recheck his blood pressure when he is scheduled for lab work.    4. Mild testicular pain, present mainly with movement..   If  the pain is still persistent in 2-3 days, instructed the patient to contact us, so we can refer him to urology.    5. Health maintenance   Advised the patient to establish primary care.     Orders Placed This Encounter   Procedures     Albumin Random Urine Quantitative with Creat Ratio     Comprehensive metabolic panel     Hematocrit     Lipid panel reflex to direct LDL Fasting

## 2018-08-30 NOTE — TELEPHONE ENCOUNTER
Patient advised. Patient verbalizes understanding and agrees to plan.       Melinda Singh RN  Endocrine Care Coordinator  [facility]

## 2018-09-25 ENCOUNTER — OFFICE VISIT (OUTPATIENT)
Dept: PEDIATRICS | Facility: CLINIC | Age: 48
End: 2018-09-25
Payer: COMMERCIAL

## 2018-09-25 VITALS
OXYGEN SATURATION: 99 % | WEIGHT: 215.7 LBS | SYSTOLIC BLOOD PRESSURE: 126 MMHG | TEMPERATURE: 98.4 F | DIASTOLIC BLOOD PRESSURE: 81 MMHG | HEART RATE: 67 BPM | BODY MASS INDEX: 31.73 KG/M2

## 2018-09-25 DIAGNOSIS — E10.9 TYPE 1 DIABETES MELLITUS WITHOUT COMPLICATION (H): Primary | ICD-10-CM

## 2018-09-25 DIAGNOSIS — R03.0 ELEVATED BLOOD PRESSURE READING WITHOUT DIAGNOSIS OF HYPERTENSION: ICD-10-CM

## 2018-09-25 DIAGNOSIS — M54.50 CHRONIC BILATERAL LOW BACK PAIN WITHOUT SCIATICA: ICD-10-CM

## 2018-09-25 DIAGNOSIS — Z80.0 FAMILY HISTORY OF COLON CANCER: ICD-10-CM

## 2018-09-25 DIAGNOSIS — Z23 NEED FOR PROPHYLACTIC VACCINATION AGAINST STREPTOCOCCUS PNEUMONIAE (PNEUMOCOCCUS): ICD-10-CM

## 2018-09-25 DIAGNOSIS — Z12.11 SCREENING FOR COLON CANCER: ICD-10-CM

## 2018-09-25 DIAGNOSIS — Z13.6 CARDIOVASCULAR SCREENING; LDL GOAL LESS THAN 100: ICD-10-CM

## 2018-09-25 DIAGNOSIS — G89.29 CHRONIC BILATERAL LOW BACK PAIN WITHOUT SCIATICA: ICD-10-CM

## 2018-09-25 DIAGNOSIS — Z23 NEED FOR PROPHYLACTIC VACCINATION AND INOCULATION AGAINST INFLUENZA: ICD-10-CM

## 2018-09-25 PROCEDURE — 90686 IIV4 VACC NO PRSV 0.5 ML IM: CPT | Performed by: FAMILY MEDICINE

## 2018-09-25 PROCEDURE — 90732 PPSV23 VACC 2 YRS+ SUBQ/IM: CPT | Performed by: FAMILY MEDICINE

## 2018-09-25 PROCEDURE — 90471 IMMUNIZATION ADMIN: CPT | Performed by: FAMILY MEDICINE

## 2018-09-25 PROCEDURE — 90472 IMMUNIZATION ADMIN EACH ADD: CPT | Performed by: FAMILY MEDICINE

## 2018-09-25 PROCEDURE — 99214 OFFICE O/P EST MOD 30 MIN: CPT | Mod: 25 | Performed by: FAMILY MEDICINE

## 2018-09-25 ASSESSMENT — PAIN SCALES - GENERAL: PAINLEVEL: MILD PAIN (3)

## 2018-09-25 NOTE — Clinical Note
Dr. Resendiz, Do you think it is okay for Kaz on baby Aspirin and low dose of Lisinopril 2.5mg for his type 1 diabetes? Thank you. Jakub Wolff MD

## 2018-09-25 NOTE — PATIENT INSTRUCTIONS
Get the flu shot and pneumovax today  CHRIS from sports medicine and [facility]    Schedule for sports consult  Schedule for colonoscopy after checking with insurance    Start taking LIPITOR 5mg every other day

## 2018-09-25 NOTE — PROGRESS NOTES
SUBJECTIVE:   Tl Sands is a 47 year old male who presents to clinic today for the following health issues:      New Patient/Transfer of Care  Est care for PCP and requesting orders for colonoscopy    Patient is new to the provider, is here to establish care.      Past medical history is significant for type 1 diabetes for more than 17 years now, on insulin, hyperlipidemia, elevated blood pressure with no history of hypertension, chronic low back pain for the past more than 6 months and family history of colon cancer in paternal uncle who  at age 70 secondary to colon cancer.    Patient sees Dr. Resenidz in endocrinology for his diabetes.  Patient states he has been taking Lipitor once a week and finds his cholesterol numbers are well within normal range.  Patient has been checking his blood pressures at home and found all the readings in normal range.    He has not been any ACE inhibitor for his history of type 2 diabetes yet.   Denies chest pain, SOB, edema legs, visual concerns, focal neurological symptoms, dizziness, syncope, palpitations.  Patient denies history of smoking, alcohol use, history of snoring, sleep apnea, sleep problems.  Patient denies concerns for abdominal pain, constipation, diarrhea, rectal bleeding, history of hemorrhoids but is worried about his family history and is requesting order for colonoscopy today.  Patient states that he has not had a physical in more than 7-8 years now          Back Pain   Complaining of dull intermittent ache in the lower back associated with low back stiffness with no radiation down to the lower extremities, tingling, numbness or weakness of lower extremities, bladder or bowel incontinence for the past 7-8 months.  Patient was seen by [facility] orthopedic sports physician before, had a lumbar MRI done 2-3 months ago at the [facility] in [place]      Duration: 7-8 momnths        Specific cause: none    Description:   Location of pain: low back  bilateral  Character of pain: dull ache and gnawing  Pain radiation:none  New numbness or weakness in legs, not attributed to pain:  no     Intensity: mild, moderate    History:   Pain interferes with job: No  History of back problems: no prior back problems  Any previous MRI or X-rays: Yes- at [facility].  Date 2months ago  Sees a specialist for back pain: Sports physician at [facility]  Therapies tried without relief: Lumbar epidural injection with no relief    Alleviating factors:   Improved by: none      Precipitating factors:  Worsened by: Nothing          Accompanying Signs & Symptoms:  Risk of Fracture:  None  Risk of Cauda Equina:  None  Risk of Infection:  None  Risk of Cancer:  None  Risk of Ankylosing Spondylitis:  Onset at age <35, male, AND morning back stiffness. no                        Hyperlipidemia Follow-Up      Rate your low fat/cholesterol diet?: not monitoring fat    Taking statin?  Yes, once a week no muscle aches from statin    Other lipid medications/supplements?:  none      Problem list and histories reviewed & adjusted, as indicated.  Additional history: as documented    Patient Active Problem List   Diagnosis     Other psoriasis     Epidermoid cyst of skin     CARDIOVASCULAR SCREENING; LDL GOAL LESS THAN 100     DM w/o complication type I (H)     Elevated blood pressure reading without diagnosis of hypertension     Hypercholesteremia     Type 1 diabetes mellitus without complication (H)     Past Surgical History:   Procedure Laterality Date     ARTHROSCOPY KNEE RT/LT      Has had bilateral knee surgeries for medial meisca; tears     HERNIA REPAIR, INGUINAL RT/LT  2000    Left     HERNIA REPAIR, INGUINAL RT/LT  2001    Right       Social History   Substance Use Topics     Smoking status: Never Smoker     Smokeless tobacco: Never Used     Alcohol use Yes      Comment: occ     Family History   Problem Relation Age of Onset     Diabetes Maternal Grandfather      Type 1      Cerebrovascular Disease Paternal Grandfather          Current Outpatient Prescriptions   Medication Sig Dispense Refill     atorvastatin (LIPITOR) 10 MG tablet Take 1 tablet (10 mg) by mouth daily 90 tablet 3     BASAGLAR 100 UNIT/ML injection Inject 26 Units Subcutaneous daily 30 mL 3     econazole nitrate 1 % cream Apply topically 2 times daily 60 g 11     glucose blood VI test strips strip Test 4-5 times daily 400 strip 3     insulin lispro (HUMALOG KWIKPEN) 100 UNIT/ML injection INJECT 5-10 UNITS SUBCUTANEOUSLY BEFORE MEAL(S), total daily dose up to 30 U 30 mL 3     insulin lispro (HUMALOG KWIKPEN) 100 UNIT/ML injection INJECT 5-10 UNITS SUBCUTANEOUSLY BEFORE MEALS 30 mL 3     insulin pen needle (B-D U/F) 31G X 8 MM USE ONE  FIVE TIMES DAILY (TO  BE  USED  WITH  INSULIN  PEN,  3  MEALS  AND  AT  BEDTIME) 300 each 3     LANCETS REGULAR MISC 1 Device 4 times daily. 360 each 3     mometasone (ELOCON) 0.1 % solution (lotion) APPLY SOLUTION TOPICALLY TWICE DAILY 60 mL 3     tadalafil (CIALIS) 20 MG tablet Take 1 tablet by mouth. As needed 20 tablet prn     Continuous Blood Gluc  (DEXCOM G6 ) TYLOR 1 each continuous (Patient not taking: Reported on 9/25/2018) 1 Device 0     Continuous Blood Gluc Sensor (DEXCOM G6 SENSOR) MISC Inject 1 each Subcutaneous See Admin Instructions Change sensor every 10 days (Patient not taking: Reported on 9/25/2018) 9 each 3     Continuous Blood Gluc Transmit (DEXCOM G6 TRANSMITTER) MISC 1 each every 3 months (Patient not taking: Reported on 9/25/2018) 1 each 1     ipratropium - albuterol 0.5 mg/2.5 mg/3 mL (DUONEB) 0.5-2.5 (3) MG/3ML neb solution Take 1 vial (3 mLs) by nebulization once for 1 dose 3 mL 0     [DISCONTINUED] BD ULTRA FINE PEN NEEDLES Inject 1 each as directed 5 times daily. To be used with insulin pen, 3 meals and bedtime 300 Device 3     No Known Allergies  Recent Labs   Lab Test  08/29/18   0815 08/29/18 07/26/17   0737 09/13/16   03/15/16   0757    03/17/15   0710   A1C   --   7.2  7.1*  7.6   < >   --    < >  7.4*   LDL  85   --   86   --    --   92   --   68   HDL  51   --   60   --    --   56   --   41   TRIG  49   --   59   --    --   82   --   115   ALT  22   --   26   --    --    --    --   28   CR  0.97   --   0.84   --    --   0.96   --    --    GFRESTIMATED  83   --   >90  Non  GFR Calc     --    --   84   --    --    GFRESTBLACK  >90   --   >90   GFR Calc     --    --   >90   GFR Calc     --    --    POTASSIUM  4.1   --   Unsatisfactory specimen - hemolyzed   --    --   3.9   < >   --    TSH   --    --   1.37   --    --   1.45   --    --     < > = values in this interval not displayed.      BP Readings from Last 3 Encounters:   09/25/18 126/81   08/29/18 140/87   12/07/17 125/78    Wt Readings from Last 3 Encounters:   09/25/18 215 lb 11.2 oz (97.8 kg)   08/29/18 214 lb 8.1 oz (97.3 kg)   12/07/17 210 lb (95.3 kg)                  Labs reviewed in EPIC    Reviewed and updated as needed this visit by clinical staff  Tobacco  Allergies  Meds  Med Hx  Surg Hx  Fam Hx  Soc Hx      Reviewed and updated as needed this visit by Provider         ROS:  CONSTITUTIONAL: NEGATIVE for fever, chills, change in weight  INTEGUMENTARY/SKIN: NEGATIVE for worrisome rashes, moles or lesions  EYES: NEGATIVE for vision changes or irritation  RESP: NEGATIVE for significant cough or SOB  CV: NEGATIVE for chest pain, palpitations or peripheral edema  CV: History of elevated blood pressures in the past  GI: NEGATIVE for nausea, abdominal pain, heartburn, or change in bowel habits  : negative for dysuria, hematuria, decreased urinary stream, erectile dysfunction  MUSCULOSKELETAL: as above  NEURO: NEGATIVE for weakness, dizziness or paresthesias  ENDOCRINE: NEGATIVE for temperature intolerance, skin/hair changes and Hx diabetes  HEME/ALLERGY/IMMUNE: NEGATIVE for bleeding problems  PSYCHIATRIC: NEGATIVE for changes in mood  or affect    OBJECTIVE:     /81 (BP Location: Right arm, Patient Position: Sitting, Cuff Size: Adult Large)  Pulse 67  Temp 98.4  F (36.9  C) (Oral)  Wt 215 lb 11.2 oz (97.8 kg)  SpO2 99%  BMI 31.73 kg/m2  Body mass index is 31.73 kg/(m^2).  GENERAL: healthy, alert and no distress  NECK: no adenopathy, no asymmetry, masses, or scars and thyroid normal to palpation  RESP: lungs clear to auscultation - no rales, rhonchi or wheezes  CV: regular rate and rhythm, normal S1 S2, no S3 or S4, no murmur, click or rub, no peripheral edema and peripheral pulses strong  MS: no gross musculoskeletal defects noted, no edema  MS: Normal gait  SKIN: no suspicious lesions or rashes  NEURO: Normal strength and tone, mentation intact and speech normal  BACK: no CVA tenderness, no paralumbar tenderness  Comprehensive back pain exam:  Tenderness of Bilateral paralumbar muscles and SI joints, Range of motion not limited by pain, Lower extremity strength functional and equal on both sides, Lower extremity reflexes within normal limits bilaterally, Lower extremity sensation normal and equal on both sides and Straight leg raise negative bilaterally  PSYCH: mentation appears normal, affect normal/bright    Diagnostic Test Results:  none     ASSESSMENT/PLAN:             1. Type 1 diabetes mellitus without complication (H)  Patient has been seeing  in endocrinology for his type 1 diabetes.  He is not on any ACE inhibitor at the time, not patient was tried on before.  His medication list did not reflect that he has been taking baby aspirin.  Reviewed with patient that given his underlying history of diabetes, he might need to be on low-dose of lisinopril 2.5 mg and a baby aspirin for prevention of vascular disease.  Patient is not sure if his aspirin was stopped by Dr. Estes 10 years ago for good reason  Chart was reviewed and was not able to find out the reason why  We sent a message in epic to Dr. Resendiz to see if  she is fine for patient to start on baby aspirin 81 mg daily and lisinopril 2.5 mg daily    - PNEUMOCOCCAL VACCINE,ADULT,SQ OR IM    2. Need for prophylactic vaccination and inoculation against influenza    - FLU VACCINE, SPLIT VIRUS, IM (QUADRIVALENT) [40673]- >3 YRS  - Vaccine Administration, Initial [17666]    3. Screening for colon cancer  Family history of colon cancer in paternal uncle.  Recommended patient to check with his insurance for coverage before calling to schedule  - GASTROENTEROLOGY ADULT REF PROCEDURE ONLY [facility] [phone]; No Provider Preference    4. Family history of colon cancer  as above    - GASTROENTEROLOGY ADULT REF PROCEDURE ONLY [facility] [phone]; No Provider Preference    5. Need for prophylactic vaccination against Streptococcus pneumoniae (pneumococcus)  Patient has not been vaccinated with pneumonia vaccination in the past 10 years  With his underlying history of type 1 diabetes, emphasized patient to get one today  - PNEUMOCOCCAL VACCINE,ADULT,SQ OR IM    6. Chronic bilateral low back pain without sciatica  ddx-lumbar degenerative disc disease.    Release of information sent today to get the sports medicine records and MRI report from [facility].    Recommended patient to start seeing sports medicine here at this location for further evaluation  Patient verbalised understanding and is agreeable to the plan.    - SPORTS MEDICINE REFERRAL    7. Elevated blood pressure reading without diagnosis of hypertension  BP Readings from Last 6 Encounters:   09/25/18 126/81   08/29/18 140/87   12/07/17 125/78   11/02/17 132/76   07/26/17 105/70   09/13/16 104/70     Will follow low salt diet, weight loss and regular exercises.  We will continue to monitor for now      (Z13.6) CARDIOVASCULAR SCREENING; LDL GOAL LESS THAN 100  Comment:   Plan:   LDL Cholesterol Calculated   Date Value Ref Range Status   08/29/2018 85 <100 mg/dL Final     Comment:     Desirable:       <100 mg/dl      Patient has been taking Lipitor 10 mg once a week though and his medication list it was stated at 10 mg once daily  Recommended patient to be consistent with the dosing, he can start taking 5 mg every day  Patient verbalised understanding and is agreeable to the plan.          Chart documentation done in part with Dragon Voice recognition Software. Although reviewed after completion, some word and grammatical error may remain.    See Patient Instructions    Jakub Wolff MD  [facility]    Injectable Influenza Immunization Documentation    1.  Is the person to be vaccinated sick today?   No    2. Does the person to be vaccinated have an allergy to a component   of the vaccine?   No  Egg Allergy Algorithm Link    3. Has the person to be vaccinated ever had a serious reaction   to influenza vaccine in the past?   No    4. Has the person to be vaccinated ever had Guillain-Barré syndrome?   No    Form completed by [facility]

## 2018-09-25 NOTE — MR AVS SNAPSHOT
After Visit Summary   9/25/2018    Tl Sands    MRN: [id number]           Patient Information     Date Of Birth          1970        Visit Information        Provider Department      9/25/2018 8:10 AM Jakub Wolff MD [facility]        Today's Diagnoses     Type 1 diabetes mellitus without complication (H)    -  1    Need for prophylactic vaccination and inoculation against influenza        Screening for colon cancer        Family history of colon cancer        Need for prophylactic vaccination against Streptococcus pneumoniae (pneumococcus)        Chronic bilateral low back pain without sciatica          Care Instructions    Get the flu shot and pneumovax today  CHRIS from sports medicine and [facility]    Schedule for sports consult  Schedule for colonoscopy after checking with insurance    Start taking LIPITOR 5mg every other day              Follow-ups after your visit        Additional Services     GASTROENTEROLOGY ADULT REF PROCEDURE ONLY [facility] [phone]; No Provider Preference       Last Lab Result: Creatinine (mg/dL)       Date                     Value                 08/29/2018               0.97             ----------  Body mass index is 31.73 kg/(m^2).     Needed:  No  Language:  English    Patient will be contacted to schedule procedure.     Please be aware that coverage of these services is subject to the terms and limitations of your health insurance plan.  Call member services at your health plan with any benefit or coverage questions.  Any procedures must be performed at a [facility] facility OR coordinated by your clinic's referral office.    Please bring the following with you to your appointment:    (1) Any X-Rays, CTs or MRIs which have been performed.  Contact the facility where they were done to arrange for  prior to your scheduled appointment.    (2) List of current medications   (3) This referral request   (4) Any  documents/labs given to you for this referral            SPORTS MEDICINE REFERRAL       Your provider has referred you to:  [facility]: [facility] [phone]   [email or web address]    Please be aware that coverage of these services is subject to the terms and limitations of your health insurance plan.  Call member services at your health plan with any benefit or coverage questions.      Please bring the following to your appointment:    >>   Any x-rays, CTs or MRIs which have been performed.  Contact the facility where they were done to arrange for  prior to your scheduled appointment.    >>   List of current medications   >>   This referral request   >>   Any documents/labs given to you for this referral                  Your next 10 appointments already scheduled     Aug 28, 2019  7:15 AM CDT   Return Visit with Rupali Resendiz MD, MG ENDO NURSE   [facility]    [address] [place] [address] [phone]              Who to contact     If you have questions or need follow up information about today's clinic visit or your schedule please contact [facility] directly at [phone].  Normal or non-critical lab and imaging results will be communicated to you by MyChart, letter or phone within 4 business days after the clinic has received the results. If you do not hear from us within 7 days, please contact the clinic through MyChart or phone. If you have a critical or abnormal lab result, we will notify you by phone as soon as possible.  Submit refill requests through [employer] or call your pharmacy and they will forward the refill request to us. Please allow 3 business days for your refill to be completed.          Additional Information About Your Visit        [employer] Information     [employer] gives you secure access to your electronic health record. If you  see a primary care provider, you can also send messages to your care team and make appointments. If you have questions, please call your primary care clinic.  If you do not have a primary care provider, please call [phone] and they will assist you.      [employer] is an electronic gateway that provides easy, online access to your medical records. With [employer], you can request a clinic appointment, read your test results, renew a prescription or communicate with your care team.     To access your existing account, please contact your [facility] or call [phone] for assistance.        Care EveryWhere ID     This is your Care EveryWhere ID. This could be used by other organizations to access your [place] medical records  [id number]        Your Vitals Were     Pulse Temperature Pulse Oximetry BMI (Body Mass Index)          67 98.4  F (36.9  C) (Oral) 99% 31.73 kg/m2         Blood Pressure from Last 3 Encounters:   09/25/18 126/81   08/29/18 140/87   12/07/17 125/78    Weight from Last 3 Encounters:   09/25/18 215 lb 11.2 oz (97.8 kg)   08/29/18 214 lb 8.1 oz (97.3 kg)   12/07/17 210 lb (95.3 kg)              We Performed the Following     FLU VACCINE, SPLIT VIRUS, IM (QUADRIVALENT) [42046]- >3 YRS     GASTROENTEROLOGY ADULT REF PROCEDURE ONLY [facility] [phone]; No Provider Preference     PNEUMOCOCCAL VACCINE,ADULT,SQ OR IM     SPORTS MEDICINE REFERRAL     Vaccine Administration, Initial [[id number]]        Primary Care Provider Office Phone # Fax #    Cathryn GONZALES MD [facility] [phone]       [address] [place] [address]        Equal Access to Services     WILNER [facility]ATH[facility] : Hadii aad brissa mcmahan Soradha, waaxda luqadaha, qaybta kaalmada adechastityyada, avinash connolly . So [facility] [phone].    ATENCIÓN: Si habla español, tiene a graham disposición servicios gratuitos de asistencia lingüística. Llame al [phone].    We comply with  applicable federal civil rights laws and [place] laws. We do not discriminate on the basis of race, color, national origin, age, disability, sex, sexual orientation, or gender identity.            Thank you!     Thank you for choosing [facility]  for your care. Our goal is always to provide you with excellent care. Hearing back from our patients is one way we can continue to improve our services. Please take a few minutes to complete the written survey that you may receive in the mail after your visit with us. Thank you!             Your Updated Medication List - Protect others around you: Learn how to safely use, store and throw away your medicines at www.disposemymeds.org.          This list is accurate as of 9/25/18  8:48 AM.  Always use your most recent med list.                   Brand Name Dispense Instructions for use Diagnosis    atorvastatin 10 MG tablet    LIPITOR    90 tablet    Take 1 tablet (10 mg) by mouth daily    Hypercholesteremia       BASAGLAR 100 UNIT/ML injection     30 mL    Inject 26 Units Subcutaneous daily    DM w/o complication type I (H)       blood glucose monitoring test strip    no brand specified    400 strip    Test 4-5 times daily    Type 1 diabetes, HbA1c goal < 7% (H)       DEXCOM G6  Fiordaliza     1 Device    1 each continuous    DM w/o complication type I (H)       DEXCOM G6 SENSOR Misc     9 each    Inject 1 each Subcutaneous See Admin Instructions Change sensor every 10 days    DM w/o complication type I (H)       DEXCOM G6 TRANSMITTER Misc     1 each    1 each every 3 months    DM w/o complication type I (H)       econazole nitrate 1 % cream     60 g    Apply topically 2 times daily    Diabetes mellitus type 1 (H), Erythrasma       * insulin lispro 100 UNIT/ML injection    HumaLOG KWIKpen    30 mL    INJECT 5-10 UNITS SUBCUTANEOUSLY BEFORE MEALS    DM w/o complication type I (H)       * insulin lispro 100 UNIT/ML injection    HumaLOG KWIKpen    30 mL     INJECT 5-10 UNITS SUBCUTANEOUSLY BEFORE MEAL(S), total daily dose up to 30 U    DM w/o complication type I (H)       insulin pen needle 31G X 8 MM    B-D U/F    300 each    USE ONE  FIVE TIMES DAILY (TO  BE  USED  WITH  INSULIN  PEN,  3  MEALS  AND  AT  BEDTIME)    DM w/o complication type I (H)       ipratropium - albuterol 0.5 mg/2.5 mg/3 mL 0.5-2.5 (3) MG/3ML neb solution    DUONEB    3 mL    Take 1 vial (3 mLs) by nebulization once for 1 dose    Cough       LANCETS REGULAR Misc     360 each    1 Device 4 times daily.    Diabetes mellitus, type 1       mometasone 0.1 % solution (lotion)    ELOCON    60 mL    APPLY SOLUTION TOPICALLY TWICE DAILY    Seborrheic dermatitis       tadalafil 20 MG tablet    CIALIS    20 tablet    Take 1 tablet by mouth. As needed    Erythrasma       * Notice:  This list has 2 medication(s) that are the same as other medications prescribed for you. Read the directions carefully, and ask your doctor or other care provider to review them with you.

## 2018-10-08 ENCOUNTER — HOSPITAL ENCOUNTER (OUTPATIENT)
Facility: AMBULATORY SURGERY CENTER | Age: 48
Discharge: HOME | End: 2018-10-08
Attending: SURGERY | Admitting: SURGERY
Payer: COMMERCIAL

## 2018-10-08 ENCOUNTER — SURGERY (OUTPATIENT)
Age: 48
End: 2018-10-08
Payer: COMMERCIAL

## 2018-10-08 VITALS
RESPIRATION RATE: 18 BRPM | DIASTOLIC BLOOD PRESSURE: 76 MMHG | SYSTOLIC BLOOD PRESSURE: 123 MMHG | BODY MASS INDEX: 31.84 KG/M2 | OXYGEN SATURATION: 99 % | HEIGHT: 69 IN | TEMPERATURE: 98.2 F | WEIGHT: 215 LBS

## 2018-10-08 LAB
COLONOSCOPY: NORMAL
GLUCOSE BLDC GLUCOMTR-MCNC: 176 MG/DL (ref 70–99)

## 2018-10-08 PROCEDURE — G8918 PT W/O PREOP ORDER IV AB PRO: HCPCS

## 2018-10-08 PROCEDURE — 45378 DIAGNOSTIC COLONOSCOPY: CPT | Performed by: SURGERY

## 2018-10-08 PROCEDURE — 45378 DIAGNOSTIC COLONOSCOPY: CPT

## 2018-10-08 PROCEDURE — G8907 PT DOC NO EVENTS ON DISCHARG: HCPCS

## 2018-10-08 RX ORDER — NALOXONE HYDROCHLORIDE 0.4 MG/ML
.1-.4 INJECTION, SOLUTION INTRAMUSCULAR; INTRAVENOUS; SUBCUTANEOUS
Status: CANCELLED | OUTPATIENT
Start: 2018-10-08 | End: 2018-10-09

## 2018-10-08 RX ORDER — FENTANYL CITRATE 50 UG/ML
INJECTION, SOLUTION INTRAMUSCULAR; INTRAVENOUS PRN
Status: DISCONTINUED | OUTPATIENT
Start: 2018-10-08 | End: 2018-10-08 | Stop reason: HOSPADM

## 2018-10-08 RX ORDER — ONDANSETRON 4 MG/1
4 TABLET, ORALLY DISINTEGRATING ORAL EVERY 6 HOURS PRN
Status: CANCELLED | OUTPATIENT
Start: 2018-10-08

## 2018-10-08 RX ORDER — FLUMAZENIL 0.1 MG/ML
0.2 INJECTION, SOLUTION INTRAVENOUS
Status: CANCELLED | OUTPATIENT
Start: 2018-10-08 | End: 2018-10-08

## 2018-10-08 RX ORDER — ONDANSETRON 2 MG/ML
4 INJECTION INTRAMUSCULAR; INTRAVENOUS EVERY 6 HOURS PRN
Status: CANCELLED | OUTPATIENT
Start: 2018-10-08

## 2018-10-08 RX ORDER — IBUPROFEN 200 MG
400-600 TABLET ORAL PRN
COMMUNITY
End: 2020-12-21

## 2018-10-08 RX ADMIN — FENTANYL CITRATE 25 MCG: 50 INJECTION, SOLUTION INTRAMUSCULAR; INTRAVENOUS at 09:46

## 2018-10-08 RX ADMIN — FENTANYL CITRATE 50 MCG: 50 INJECTION, SOLUTION INTRAMUSCULAR; INTRAVENOUS at 09:43

## 2018-10-18 ENCOUNTER — OFFICE VISIT (OUTPATIENT)
Dept: ORTHOPEDICS | Facility: CLINIC | Age: 48
End: 2018-10-18
Attending: FAMILY MEDICINE
Payer: COMMERCIAL

## 2018-10-18 VITALS
WEIGHT: 215 LBS | HEIGHT: 69 IN | HEART RATE: 69 BPM | DIASTOLIC BLOOD PRESSURE: 76 MMHG | OXYGEN SATURATION: 98 % | SYSTOLIC BLOOD PRESSURE: 131 MMHG | BODY MASS INDEX: 31.84 KG/M2

## 2018-10-18 DIAGNOSIS — M54.50 CHRONIC BILATERAL LOW BACK PAIN WITHOUT SCIATICA: Primary | ICD-10-CM

## 2018-10-18 DIAGNOSIS — G89.29 CHRONIC BILATERAL LOW BACK PAIN WITHOUT SCIATICA: Primary | ICD-10-CM

## 2018-10-18 DIAGNOSIS — M54.16 LUMBAR RADICULOPATHY: ICD-10-CM

## 2018-10-18 PROCEDURE — 99214 OFFICE O/P EST MOD 30 MIN: CPT | Performed by: FAMILY MEDICINE

## 2018-10-18 ASSESSMENT — PAIN SCALES - GENERAL: PAINLEVEL: MILD PAIN (2)

## 2018-10-18 NOTE — PATIENT INSTRUCTIONS
Thanks for coming today.  Ortho/Sports Medicine Clinic  [address] [place] [address]    To schedule future appointments in Ortho Clinic, you may call [phone].    To schedule ordered imaging by your provider:   Call [facility] [phone]    To schedule an injection ordered by your provider:  Call [facility] Injection scheduling line: [phone]  [employer] available online at:  [email or web address]    Please call if any further questions or concerns ([phone]).  Clinic hours 8 am to 5 pm.    Return to clinic (call) if symptoms worsen or fail to improve.

## 2018-10-18 NOTE — MR AVS SNAPSHOT
After Visit Summary   10/18/2018    Tl Sands    MRN: [id number]           Patient Information     Date Of Birth          1970        Visit Information        Provider Department      10/18/2018 8:00 AM Thai Mims,  [facility]        Today's Diagnoses     Chronic bilateral low back pain without sciatica    -  1    Lumbar radiculopathy          Care Instructions    Thanks for coming today.  Ortho/Sports Medicine Clinic  [address] [place] [address]    To schedule future appointments in Ortho Clinic, you may call [phone].    To schedule ordered imaging by your provider:   Call [facility] [phone]    To schedule an injection ordered by your provider:  Call [facility] Injection scheduling line: [phone]  MyChart available online at:  [email or web address]    Please call if any further questions or concerns ([phone]).  Clinic hours 8 am to 5 pm.    Return to clinic (call) if symptoms worsen or fail to improve.            Follow-ups after your visit        Your next 10 appointments already scheduled     Aug 28, 2019  7:15 AM CDT   Return Visit with Rupali Resendiz MD, MG ENDO NURSE   [facility] ([facility])    [address] [place] [address] [phone]              Future tests that were ordered for you today     Open Future Orders        Priority Expected Expires Ordered    XR Lumbar Epidural Injection Incl Imaging Routine 10/18/2018 10/18/2019 10/18/2018            Who to contact     If you have questions or need follow up information about today's clinic visit or your schedule please contact [facility] directly at [phone].  Normal or non-critical lab and imaging results will be communicated to you by MyChart, letter or phone within 4 business days after the clinic has received the results. If you do not hear from us within 7  "days, please contact the clinic through [employer] or phone. If you have a critical or abnormal lab result, we will notify you by phone as soon as possible.  Submit refill requests through [employer] or call your pharmacy and they will forward the refill request to us. Please allow 3 business days for your refill to be completed.          Additional Information About Your Visit        [employer] Information     [employer] gives you secure access to your electronic health record. If you see a primary care provider, you can also send messages to your care team and make appointments. If you have questions, please call your primary care clinic.  If you do not have a primary care provider, please call [phone] and they will assist you.      [employer] is an electronic gateway that provides easy, online access to your medical records. With [employer], you can request a clinic appointment, read your test results, renew a prescription or communicate with your care team.     To access your existing account, please contact your [facility] or call [phone] for assistance.        Care EveryWhere ID     This is your Care EveryWhere ID. This could be used by other organizations to access your [facility] medical records  [id number]        Your Vitals Were     Pulse Height Pulse Oximetry BMI (Body Mass Index)          69 1.756 m (5' 9.13\") 98% 31.63 kg/m2         Blood Pressure from Last 3 Encounters:   10/18/18 131/76   10/08/18 123/76   09/25/18 126/81    Weight from Last 3 Encounters:   10/18/18 97.5 kg (215 lb)   10/01/18 97.5 kg (215 lb)   09/25/18 97.8 kg (215 lb 11.2 oz)               Primary Care Provider Office Phone # Fax #    Cathryn Leung [phone]       [facility] [address] [address]        Equal Access to Services     BHARATI CASTANEDA AH: Sean morenoo Veronica, waaxda luqadaha, qaybta kaalmada avinash leon. So wa " [phone].    ATENCIÓN: Si anat weber, tiene a graham disposición servicios gratuitos de asistencia lingüística. Dwaine godinez [phone].    We comply with applicable federal civil rights laws and [place] laws. We do not discriminate on the basis of race, color, national origin, age, disability, sex, sexual orientation, or gender identity.            Thank you!     Thank you for choosing [facility]  for your care. Our goal is always to provide you with excellent care. Hearing back from our patients is one way we can continue to improve our services. Please take a few minutes to complete the written survey that you may receive in the mail after your visit with us. Thank you!             Your Updated Medication List - Protect others around you: Learn how to safely use, store and throw away your medicines at www.disposemymeds.org.          This list is accurate as of 10/18/18 12:00 PM.  Always use your most recent med list.                   Brand Name Dispense Instructions for use Diagnosis    atorvastatin 10 MG tablet    LIPITOR    90 tablet    Take 1 tablet (10 mg) by mouth daily    Hypercholesteremia       BASAGLAR 100 UNIT/ML injection     30 mL    Inject 26 Units Subcutaneous daily    DM w/o complication type I (H)       blood glucose monitoring test strip    no brand specified    400 strip    Test 4-5 times daily    Type 1 diabetes, HbA1c goal < 7% (H)       DEXCOM G6  Fiordaliza     1 Device    1 each continuous    DM w/o complication type I (H)       DEXCOM G6 SENSOR Misc     9 each    Inject 1 each Subcutaneous See Admin Instructions Change sensor every 10 days    DM w/o complication type I (H)       DEXCOM G6 TRANSMITTER Misc     1 each    1 each every 3 months    DM w/o complication type I (H)       econazole nitrate 1 % cream     60 g    Apply topically 2 times daily    Diabetes mellitus type 1 (H), Erythrasma       ibuprofen 200 MG tablet    ADVIL/MOTRIN     Take 400-600 mg by mouth  as needed        * insulin lispro 100 UNIT/ML injection    HumaLOG KWIKpen    30 mL    INJECT 5-10 UNITS SUBCUTANEOUSLY BEFORE MEALS    DM w/o complication type I (H)       * insulin lispro 100 UNIT/ML injection    HumaLOG KWIKpen    30 mL    INJECT 5-10 UNITS SUBCUTANEOUSLY BEFORE MEAL(S), total daily dose up to 30 U    DM w/o complication type I (H)       insulin pen needle 31G X 8 MM    B-D U/F    300 each    USE ONE  FIVE TIMES DAILY (TO  BE  USED  WITH  INSULIN  PEN,  3  MEALS  AND  AT  BEDTIME)    DM w/o complication type I (H)       LANCETS REGULAR Misc     360 each    1 Device 4 times daily.    Diabetes mellitus, type 1       mometasone 0.1 % solution (lotion)    ELOCON    60 mL    APPLY SOLUTION TOPICALLY TWICE DAILY    Seborrheic dermatitis       tadalafil 20 MG tablet    CIALIS    20 tablet    Take 1 tablet by mouth. As needed    Erythrasma       * Notice:  This list has 2 medication(s) that are the same as other medications prescribed for you. Read the directions carefully, and ask your doctor or other care provider to review them with you.

## 2018-10-18 NOTE — NURSING NOTE
"Tl Sands's chief complaint for this visit includes:  Chief Complaint   Patient presents with     Consult     lumbar back pain for years.      PCP: Cathryn Leung    Referring Provider:  Jakub Wolff MD  [address] [place] [address]    /76  Pulse 69  Ht 1.756 m (5' 9.13\")  Wt 97.5 kg (215 lb)  SpO2 98%  BMI 31.63 kg/m2  Mild Pain (2)     Do you need any medication refills at today's visit? No        "

## 2018-10-18 NOTE — LETTER
10/18/2018         RE: Tl Sands  [address]  [facility] [address]        Dear Colleague,    Thank you for referring your patient, Tl Sands, to the [facility]. Please see a copy of my visit note below.    CHIEF COMPLAINT:  No chief complaint on file.       HISTORY OF PRESENT ILLNESS  Mr. Sands is a pleasant 48 year old year old male who presents to clinic today with back pain.  He is seen at the request of Dr. Wolff.    Kaz started to have low back pain many years ago, he is not exactly sure when.  There was no clear event that caused his pain.  Pain is described as a dull ache in the low back, intermittent, worse the more he is on his feet and worse as the day goes on.  His low back is stiff all the time.  He denies any numbness or tingling in the extremities, no weakness.  At times he will experience pain that goes down his right leg to his right outer calf area.  Kaz has been seen previously by multiple providers in the area.  He has had injections and medial branch block.  These have had varying results.  He most recently had an MRI a few months ago at [facility].    Additional history: as documented    MEDICAL HISTORY  Patient Active Problem List   Diagnosis     Other psoriasis     Epidermoid cyst of skin     CARDIOVASCULAR SCREENING; LDL GOAL LESS THAN 100     DM w/o complication type I (H)     Elevated blood pressure reading without diagnosis of hypertension     Hypercholesteremia     Type 1 diabetes mellitus without complication (H)     Family history of colon cancer     Chronic bilateral low back pain without sciatica       Current Outpatient Prescriptions   Medication Sig Dispense Refill     atorvastatin (LIPITOR) 10 MG tablet Take 1 tablet (10 mg) by mouth daily 90 tablet 3     BASAGLAR 100 UNIT/ML injection Inject 26 Units Subcutaneous daily 30 mL 3     Continuous Blood Gluc  (DEXCOM G6 ) TYLOR 1 each continuous (Patient not taking: Reported  on 9/25/2018) 1 Device 0     Continuous Blood Gluc Sensor (DEXCOM G6 SENSOR) MISC Inject 1 each Subcutaneous See Admin Instructions Change sensor every 10 days (Patient not taking: Reported on 9/25/2018) 9 each 3     Continuous Blood Gluc Transmit (DEXCOM G6 TRANSMITTER) MISC 1 each every 3 months (Patient not taking: Reported on 9/25/2018) 1 each 1     econazole nitrate 1 % cream Apply topically 2 times daily 60 g 11     glucose blood VI test strips strip Test 4-5 times daily 400 strip 3     ibuprofen (ADVIL/MOTRIN) 200 MG tablet Take 400-600 mg by mouth as needed       insulin lispro (HUMALOG KWIKPEN) 100 UNIT/ML injection INJECT 5-10 UNITS SUBCUTANEOUSLY BEFORE MEAL(S), total daily dose up to 30 U 30 mL 3     insulin lispro (HUMALOG KWIKPEN) 100 UNIT/ML injection INJECT 5-10 UNITS SUBCUTANEOUSLY BEFORE MEALS 30 mL 3     insulin pen needle (B-D U/F) 31G X 8 MM USE ONE  FIVE TIMES DAILY (TO  BE  USED  WITH  INSULIN  PEN,  3  MEALS  AND  AT  BEDTIME) 300 each 3     LANCETS REGULAR MISC 1 Device 4 times daily. 360 each 3     mometasone (ELOCON) 0.1 % solution (lotion) APPLY SOLUTION TOPICALLY TWICE DAILY 60 mL 3     tadalafil (CIALIS) 20 MG tablet Take 1 tablet by mouth. As needed 20 tablet prn     [DISCONTINUED] BD ULTRA FINE PEN NEEDLES Inject 1 each as directed 5 times daily. To be used with insulin pen, 3 meals and bedtime 300 Device 3       No Known Allergies    Family History   Problem Relation Age of Onset     Diabetes Maternal Grandfather      Type 1     Cerebrovascular Disease Paternal Grandfather      Colon Cancer Paternal Uncle        Additional medical/Social/Surgical histories reviewed in [facility] and updated as appropriate.     REVIEW OF SYSTEMS (10/18/2018)  CONSTITUTIONAL: Denies fever and weight loss  EYES: Denies acute vision changes  ENT: Denies hearing changes or difficulty swallowing  CARDIAC: Denies chest pain or edema  RESPIRATORY: Denies dyspnea, cough or wheeze  GASTROINTESTINAL: Denies abdominal  "pain, nausea, vomiting  MUSCULOSKELETAL: See HPI  SKIN: Denies any recent rash or lesion  NEUROLOGICAL: Denies numbness or focal weakness  PSYCHIATRIC: No history of psychiatric symptoms or problems  ENDOCRINE:   Lab Results   Component Value Date    A1C 7.2 08/29/2018    A1C 7.1 07/26/2017    A1C 7.6 09/13/2016    A1C 7.5 03/15/2016    A1C 7.9 09/23/2015     HEMATOLOGY: Denies episodes of easy bleeding      PHYSICAL EXAM  Vitals:    10/18/18 0817   BP: 131/76   Pulse: 69   SpO2: 98%   Weight: 97.5 kg (215 lb)   Height: 1.756 m (5' 9.13\")     General  - normal appearance, in no obvious distress  CV  - normal peripheral perfusion  Pulm  - normal respiratory pattern, non-labored  Musculoskeletal - lumbar spine  - stance: slow to rise and sit  - inspection: normal bone and joint alignment, no obvious scoliosis  - palpation: bilateral lumbar paravertebral spasm  - ROM: pain with extension  - strength: lower extremities 5/5 in all planes  - special tests:  (-) straight leg raise bilaterally  (-) slump test  Neuro  - patellar and Achilles DTRs 2+ bilaterally, no sensory or motor deficit, grossly normal coordination, normal muscle tone  Skin  - no ecchymosis, erythema, warmth, or induration, no obvious rash  Psych  - interactive, appropriate, normal mood and affect         ASSESSMENT & PLAN  Mr. Sands is a 48 year old year old male who presents to clinic today with chronic back pain.    We discussed his symptoms in detail.    I am going to review his MRI when it is available, as well as his most recent injection.  Depending upon the type of injection that he had most recently we may be able to refer him for a different injection on a diagnostic and therapeutic basis.    I will get in touch with Kaz over the next day when the new injection is ordered.    Thank you for allowing me to participate in Kaz's care.    Thai Mims DO, [facility]  Primary Care Sports Medicine           Again, thank you for allowing me to " participate in the care of your patient.        Sincerely,        Thai Mims, DO

## 2018-10-18 NOTE — PROGRESS NOTES
CHIEF COMPLAINT:  No chief complaint on file.       HISTORY OF PRESENT ILLNESS  Mr. Sands is a pleasant 48 year old year old male who presents to clinic today with back pain.  He is seen at the request of Dr. Wolff.    Kaz started to have low back pain many years ago, he is not exactly sure when.  There was no clear event that caused his pain.  Pain is described as a dull ache in the low back, intermittent, worse the more he is on his feet and worse as the day goes on.  His low back is stiff all the time.  He denies any numbness or tingling in the extremities, no weakness.  At times he will experience pain that goes down his right leg to his right outer calf area.  Kaz has been seen previously by multiple providers in the area.  He has had injections and medial branch block.  These have had varying results.  He most recently had an MRI a few months ago at [facility].    Additional history: as documented    MEDICAL HISTORY  Patient Active Problem List   Diagnosis     Other psoriasis     Epidermoid cyst of skin     CARDIOVASCULAR SCREENING; LDL GOAL LESS THAN 100     DM w/o complication type I (H)     Elevated blood pressure reading without diagnosis of hypertension     Hypercholesteremia     Type 1 diabetes mellitus without complication (H)     Family history of colon cancer     Chronic bilateral low back pain without sciatica       Current Outpatient Prescriptions   Medication Sig Dispense Refill     atorvastatin (LIPITOR) 10 MG tablet Take 1 tablet (10 mg) by mouth daily 90 tablet 3     BASAGLAR 100 UNIT/ML injection Inject 26 Units Subcutaneous daily 30 mL 3     Continuous Blood Gluc  (DEXCOM G6 ) TYLOR 1 each continuous (Patient not taking: Reported on 9/25/2018) 1 Device 0     Continuous Blood Gluc Sensor (DEXCOM G6 SENSOR) MISC Inject 1 each Subcutaneous See Admin Instructions Change sensor every 10 days (Patient not taking: Reported on 9/25/2018) 9 each 3     Continuous Blood Gluc Transmit  (DEXCOM G6 TRANSMITTER) MISC 1 each every 3 months (Patient not taking: Reported on 9/25/2018) 1 each 1     econazole nitrate 1 % cream Apply topically 2 times daily 60 g 11     glucose blood VI test strips strip Test 4-5 times daily 400 strip 3     ibuprofen (ADVIL/MOTRIN) 200 MG tablet Take 400-600 mg by mouth as needed       insulin lispro (HUMALOG KWIKPEN) 100 UNIT/ML injection INJECT 5-10 UNITS SUBCUTANEOUSLY BEFORE MEAL(S), total daily dose up to 30 U 30 mL 3     insulin lispro (HUMALOG KWIKPEN) 100 UNIT/ML injection INJECT 5-10 UNITS SUBCUTANEOUSLY BEFORE MEALS 30 mL 3     insulin pen needle (B-D U/F) 31G X 8 MM USE ONE  FIVE TIMES DAILY (TO  BE  USED  WITH  INSULIN  PEN,  3  MEALS  AND  AT  BEDTIME) 300 each 3     LANCETS REGULAR MISC 1 Device 4 times daily. 360 each 3     mometasone (ELOCON) 0.1 % solution (lotion) APPLY SOLUTION TOPICALLY TWICE DAILY 60 mL 3     tadalafil (CIALIS) 20 MG tablet Take 1 tablet by mouth. As needed 20 tablet prn     [DISCONTINUED] BD ULTRA FINE PEN NEEDLES Inject 1 each as directed 5 times daily. To be used with insulin pen, 3 meals and bedtime 300 Device 3       No Known Allergies    Family History   Problem Relation Age of Onset     Diabetes Maternal Grandfather      Type 1     Cerebrovascular Disease Paternal Grandfather      Colon Cancer Paternal Uncle        Additional medical/Social/Surgical histories reviewed in [facility] and updated as appropriate.     REVIEW OF SYSTEMS (10/18/2018)  CONSTITUTIONAL: Denies fever and weight loss  EYES: Denies acute vision changes  ENT: Denies hearing changes or difficulty swallowing  CARDIAC: Denies chest pain or edema  RESPIRATORY: Denies dyspnea, cough or wheeze  GASTROINTESTINAL: Denies abdominal pain, nausea, vomiting  MUSCULOSKELETAL: See HPI  SKIN: Denies any recent rash or lesion  NEUROLOGICAL: Denies numbness or focal weakness  PSYCHIATRIC: No history of psychiatric symptoms or problems  ENDOCRINE:   Lab Results   Component Value Date  "   A1C 7.2 08/29/2018    A1C 7.1 07/26/2017    A1C 7.6 09/13/2016    A1C 7.5 03/15/2016    A1C 7.9 09/23/2015     HEMATOLOGY: Denies episodes of easy bleeding      PHYSICAL EXAM  Vitals:    10/18/18 0817   BP: 131/76   Pulse: 69   SpO2: 98%   Weight: 97.5 kg (215 lb)   Height: 1.756 m (5' 9.13\")     General  - normal appearance, in no obvious distress  CV  - normal peripheral perfusion  Pulm  - normal respiratory pattern, non-labored  Musculoskeletal - lumbar spine  - stance: slow to rise and sit  - inspection: normal bone and joint alignment, no obvious scoliosis  - palpation: bilateral lumbar paravertebral spasm  - ROM: pain with extension  - strength: lower extremities 5/5 in all planes  - special tests:  (-) straight leg raise bilaterally  (-) slump test  Neuro  - patellar and Achilles DTRs 2+ bilaterally, no sensory or motor deficit, grossly normal coordination, normal muscle tone  Skin  - no ecchymosis, erythema, warmth, or induration, no obvious rash  Psych  - interactive, appropriate, normal mood and affect         ASSESSMENT & PLAN  Mr. Sands is a 48 year old year old male who presents to clinic today with chronic back pain.    We discussed his symptoms in detail.    I am going to review his MRI when it is available, as well as his most recent injection.  Depending upon the type of injection that he had most recently we may be able to refer him for a different injection on a diagnostic and therapeutic basis.    I will get in touch with Kaz over the next day when the new injection is ordered.    Thank you for allowing me to participate in Kaz's care.    Thai Mims DO, [facility]  Primary Care Sports Medicine         "

## 2018-11-13 ENCOUNTER — RADIANT APPOINTMENT (OUTPATIENT)
Dept: GENERAL RADIOLOGY | Facility: CLINIC | Age: 48
End: 2018-11-13
Attending: FAMILY MEDICINE
Payer: COMMERCIAL

## 2018-11-13 VITALS — HEART RATE: 64 BPM | OXYGEN SATURATION: 100 % | SYSTOLIC BLOOD PRESSURE: 125 MMHG | DIASTOLIC BLOOD PRESSURE: 70 MMHG

## 2018-11-13 DIAGNOSIS — M54.16 LUMBAR RADICULOPATHY: ICD-10-CM

## 2018-11-13 PROCEDURE — 62323 NJX INTERLAMINAR LMBR/SAC: CPT | Performed by: RADIOLOGY

## 2018-11-13 RX ORDER — METHYLPREDNISOLONE ACETATE 80 MG/ML
80 INJECTION, SUSPENSION INTRA-ARTICULAR; INTRALESIONAL; INTRAMUSCULAR; SOFT TISSUE ONCE
Status: COMPLETED | OUTPATIENT
Start: 2018-11-13 | End: 2018-11-13

## 2018-11-13 RX ORDER — IOPAMIDOL 408 MG/ML
10 INJECTION, SOLUTION INTRATHECAL ONCE
Status: COMPLETED | OUTPATIENT
Start: 2018-11-13 | End: 2018-11-13

## 2018-11-13 RX ORDER — BUPIVACAINE HYDROCHLORIDE 5 MG/ML
10 INJECTION, SOLUTION PERINEURAL ONCE
Status: COMPLETED | OUTPATIENT
Start: 2018-11-13 | End: 2018-11-13

## 2018-11-13 RX ADMIN — IOPAMIDOL 2 ML: 408 INJECTION, SOLUTION INTRATHECAL at 08:04

## 2018-11-13 RX ADMIN — METHYLPREDNISOLONE ACETATE 80 MG: 80 INJECTION, SUSPENSION INTRA-ARTICULAR; INTRALESIONAL; INTRAMUSCULAR; SOFT TISSUE at 08:05

## 2018-11-13 RX ADMIN — BUPIVACAINE HYDROCHLORIDE 10 MG: 5 INJECTION, SOLUTION PERINEURAL at 08:04

## 2018-11-13 NOTE — PROGRESS NOTES
: Tl Sands was seen in X-ray today for a lumbar epidural injection. Patient rated pain before procedure 1-2/10. After procedure patient rated pain 0/10. This pain level is acceptable to patient. Patient discharged home with his Wife.

## 2018-11-13 NOTE — DISCHARGE SUMMARY
AFTER YOU GO HOME    ? DO relax; minimize your activity for 24 hours  ? You may resume normal activity tomorrow  ? You may remove the bandage in the evening or next morning  ? You may resume bathing the next day  ? Drink at least 4 extra glasses of fluid today if not on fluid restrictions  ? DO NOT drive or operate machinery at home or at work for at least 24 hours      VISIT THE EMERGENCY ROOM OR URGENT CARE IF:    ? There is redness or swelling at the injection site  ? There is discharge from the injection site  ? You develop a temperature of 101  F or greater      ADDITIONAL INSTRUCTIONS:     ? You may resume your Coumadin or other blood thinner at your regular dose today.  Follow up with your physician to have your INR rechecked if indicated.  ? If you gain no relief from the injection after two (2) weeks, follow-up with your provider for your options.        Contacts:    During business hours from 8 to 5 pm, you may call [phone] to reach a nurse advisor at [facility].  After hours, call [facility]  [phone].  Ask for the Radiologist on-call.  Someone is on-call 24 hrs/day.  [facility] Toll Free Number   [phone]

## 2018-12-10 ENCOUNTER — TRANSFERRED RECORDS (OUTPATIENT)
Dept: HEALTH INFORMATION MANAGEMENT | Facility: CLINIC | Age: 48
End: 2018-12-10

## 2018-12-10 ENCOUNTER — HOSPITAL ENCOUNTER (OUTPATIENT)
Facility: AMBULATORY SURGERY CENTER | Age: 48
End: 2018-12-10
Attending: INTERNAL MEDICINE
Payer: COMMERCIAL

## 2018-12-10 ENCOUNTER — OFFICE VISIT (OUTPATIENT)
Dept: PEDIATRICS | Facility: CLINIC | Age: 48
End: 2018-12-10
Payer: COMMERCIAL

## 2018-12-10 ENCOUNTER — TELEPHONE (OUTPATIENT)
Dept: SURGERY | Facility: CLINIC | Age: 48
End: 2018-12-10

## 2018-12-10 DIAGNOSIS — K21.9 GASTROESOPHAGEAL REFLUX DISEASE, ESOPHAGITIS PRESENCE NOT SPECIFIED: ICD-10-CM

## 2018-12-10 DIAGNOSIS — R09.89 CHRONIC THROAT CLEARING: ICD-10-CM

## 2018-12-10 DIAGNOSIS — E10.9 DM W/O COMPLICATION TYPE I (H): ICD-10-CM

## 2018-12-10 DIAGNOSIS — S46.212D BICEPS RUPTURE, DISTAL, LEFT, SUBSEQUENT ENCOUNTER: ICD-10-CM

## 2018-12-10 DIAGNOSIS — E10.9 TYPE 1 DIABETES MELLITUS WITHOUT COMPLICATION (H): ICD-10-CM

## 2018-12-10 DIAGNOSIS — Z01.818 PREOP GENERAL PHYSICAL EXAM: Primary | ICD-10-CM

## 2018-12-10 DIAGNOSIS — E78.5 HYPERLIPIDEMIA LDL GOAL <100: ICD-10-CM

## 2018-12-10 LAB
ANION GAP SERPL CALCULATED.3IONS-SCNC: 6 MMOL/L (ref 3–14)
BASOPHILS # BLD AUTO: 0.1 10E9/L (ref 0–0.2)
BASOPHILS NFR BLD AUTO: 0.7 %
BUN SERPL-MCNC: 15 MG/DL (ref 7–30)
CALCIUM SERPL-MCNC: 9.1 MG/DL (ref 8.5–10.1)
CHLORIDE SERPL-SCNC: 106 MMOL/L (ref 94–109)
CO2 SERPL-SCNC: 29 MMOL/L (ref 20–32)
CREAT SERPL-MCNC: 0.73 MG/DL (ref 0.66–1.25)
DIFFERENTIAL METHOD BLD: NORMAL
EOSINOPHIL # BLD AUTO: 0.1 10E9/L (ref 0–0.7)
EOSINOPHIL NFR BLD AUTO: 1.4 %
ERYTHROCYTE [DISTWIDTH] IN BLOOD BY AUTOMATED COUNT: 12.5 % (ref 10–15)
GFR SERPL CREATININE-BSD FRML MDRD: >90 ML/MIN/1.7M2
GLUCOSE SERPL-MCNC: 144 MG/DL (ref 70–99)
HCT VFR BLD AUTO: 44.5 % (ref 40–53)
HGB BLD-MCNC: 14.9 G/DL (ref 13.3–17.7)
IMM GRANULOCYTES # BLD: 0 10E9/L (ref 0–0.4)
IMM GRANULOCYTES NFR BLD: 0.1 %
LYMPHOCYTES # BLD AUTO: 2.2 10E9/L (ref 0.8–5.3)
LYMPHOCYTES NFR BLD AUTO: 31.8 %
MCH RBC QN AUTO: 29.4 PG (ref 26.5–33)
MCHC RBC AUTO-ENTMCNC: 33.5 G/DL (ref 31.5–36.5)
MCV RBC AUTO: 88 FL (ref 78–100)
MONOCYTES # BLD AUTO: 0.6 10E9/L (ref 0–1.3)
MONOCYTES NFR BLD AUTO: 8.4 %
NEUTROPHILS # BLD AUTO: 4 10E9/L (ref 1.6–8.3)
NEUTROPHILS NFR BLD AUTO: 57.6 %
PLATELET # BLD AUTO: 194 10E9/L (ref 150–450)
POTASSIUM SERPL-SCNC: 3.9 MMOL/L (ref 3.4–5.3)
RBC # BLD AUTO: 5.07 10E12/L (ref 4.4–5.9)
SODIUM SERPL-SCNC: 141 MMOL/L (ref 133–144)
WBC # BLD AUTO: 6.9 10E9/L (ref 4–11)

## 2018-12-10 PROCEDURE — 80048 BASIC METABOLIC PNL TOTAL CA: CPT | Performed by: FAMILY MEDICINE

## 2018-12-10 PROCEDURE — 99215 OFFICE O/P EST HI 40 MIN: CPT | Performed by: FAMILY MEDICINE

## 2018-12-10 PROCEDURE — 85025 COMPLETE CBC W/AUTO DIFF WBC: CPT | Performed by: FAMILY MEDICINE

## 2018-12-10 PROCEDURE — 87338 HPYLORI STOOL AG IA: CPT | Performed by: FAMILY MEDICINE

## 2018-12-10 PROCEDURE — 36415 COLL VENOUS BLD VENIPUNCTURE: CPT | Performed by: FAMILY MEDICINE

## 2018-12-10 RX ORDER — INSULIN GLARGINE 100 [IU]/ML
INJECTION, SOLUTION SUBCUTANEOUS
Qty: 30 ML | Refills: 1 | Status: SHIPPED | OUTPATIENT
Start: 2018-12-10 | End: 2019-06-27

## 2018-12-10 ASSESSMENT — ANXIETY QUESTIONNAIRES
1. FEELING NERVOUS, ANXIOUS, OR ON EDGE: NOT AT ALL
5. BEING SO RESTLESS THAT IT IS HARD TO SIT STILL: NOT AT ALL
GAD7 TOTAL SCORE: 0
6. BECOMING EASILY ANNOYED OR IRRITABLE: NOT AT ALL
3. WORRYING TOO MUCH ABOUT DIFFERENT THINGS: NOT AT ALL
7. FEELING AFRAID AS IF SOMETHING AWFUL MIGHT HAPPEN: NOT AT ALL
2. NOT BEING ABLE TO STOP OR CONTROL WORRYING: NOT AT ALL

## 2018-12-10 ASSESSMENT — MIFFLIN-ST. JEOR: SCORE: 1811.89

## 2018-12-10 ASSESSMENT — PATIENT HEALTH QUESTIONNAIRE - PHQ9
SUM OF ALL RESPONSES TO PHQ QUESTIONS 1-9: 0
5. POOR APPETITE OR OVEREATING: NOT AT ALL
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
## 2018-12-10 NOTE — PATIENT INSTRUCTIONS
Schedule for upper GI endoscopy  Schedule for recheck in 2 months    Get the labs today    Start on PRILOSEC 20 mg once daily at bedtime        Before Your Surgery    Call your surgeon if there is any change in your health. This includes signs of a cold or flu (such as a sore throat, runny nose, cough, rash or fever).    Do not smoke, drink alcohol or take over the counter medicine (unless your surgeon or primary care doctor tells you to) for the 24 hours before and after surgery.    If you take prescribed drugs: Follow your doctor s orders about which medicines to take and which to stop until after surgery.    Eating and drinking prior to surgery: follow the instructions from your surgeon    Take a shower or bath the night before surgery. Use the soap your surgeon gave you to gently clean your skin. If you do not have soap from your surgeon, use your regular soap. Do not shave or scrub the surgery site.  Wear clean pajamas and have clean sheets on your bed.

## 2018-12-10 NOTE — TELEPHONE ENCOUNTER
Location: [facility] [place]  The name of the procedure: EGD  Provider scheduled with: Dr. Silver  Date 12/18/18, Time 9:15am  Pharmacy Name NA, and Fax #: NA  Prep Questions: NA

## 2018-12-11 LAB
H PYLORI AG STL QL IA: NORMAL
SPECIMEN SOURCE: NORMAL

## 2018-12-11 ASSESSMENT — ANXIETY QUESTIONNAIRES: GAD7 TOTAL SCORE: 0

## 2018-12-11 NOTE — RESULT ENCOUNTER NOTE
Wale Mccurdy,  Your lab test showed negative H. pylori from the stool testing, this is reassuring.    Let me know if you have any questions. Take care.  Jakub Wolff MD

## 2018-12-18 ENCOUNTER — HOSPITAL ENCOUNTER (OUTPATIENT)
Facility: AMBULATORY SURGERY CENTER | Age: 48
Discharge: HOME | End: 2018-12-18
Attending: SURGERY | Admitting: SURGERY
Payer: COMMERCIAL

## 2018-12-18 VITALS
DIASTOLIC BLOOD PRESSURE: 78 MMHG | RESPIRATION RATE: 16 BRPM | TEMPERATURE: 97.4 F | SYSTOLIC BLOOD PRESSURE: 121 MMHG | OXYGEN SATURATION: 96 %

## 2018-12-18 DIAGNOSIS — K29.80 DUODENITIS: Primary | ICD-10-CM

## 2018-12-18 LAB
GLUCOSE BLDC GLUCOMTR-MCNC: 218 MG/DL (ref 70–99)
UPPER GI ENDOSCOPY: NORMAL

## 2018-12-18 PROCEDURE — 43239 EGD BIOPSY SINGLE/MULTIPLE: CPT | Performed by: SURGERY

## 2018-12-18 PROCEDURE — 88305 TISSUE EXAM BY PATHOLOGIST: CPT | Performed by: SURGERY

## 2018-12-18 PROCEDURE — 43239 EGD BIOPSY SINGLE/MULTIPLE: CPT

## 2018-12-18 PROCEDURE — G8918 PT W/O PREOP ORDER IV AB PRO: HCPCS

## 2018-12-18 PROCEDURE — G8907 PT DOC NO EVENTS ON DISCHARG: HCPCS

## 2018-12-18 RX ORDER — ONDANSETRON 2 MG/ML
4 INJECTION INTRAMUSCULAR; INTRAVENOUS
Status: DISCONTINUED | OUTPATIENT
Start: 2018-12-18 | End: 2018-12-19 | Stop reason: HOSPADM

## 2018-12-18 RX ORDER — FENTANYL CITRATE 50 UG/ML
INJECTION, SOLUTION INTRAMUSCULAR; INTRAVENOUS PRN
Status: DISCONTINUED | OUTPATIENT
Start: 2018-12-18 | End: 2018-12-18 | Stop reason: HOSPADM

## 2018-12-18 RX ORDER — LIDOCAINE 40 MG/G
CREAM TOPICAL
Status: DISCONTINUED | OUTPATIENT
Start: 2018-12-18 | End: 2018-12-19 | Stop reason: HOSPADM

## 2018-12-20 LAB — COPATH REPORT: NORMAL

## 2018-12-28 ENCOUNTER — TRANSFERRED RECORDS (OUTPATIENT)
Dept: HEALTH INFORMATION MANAGEMENT | Facility: CLINIC | Age: 48
End: 2018-12-28

## 2019-03-13 ENCOUNTER — TELEPHONE (OUTPATIENT)
Dept: ORTHOPEDICS | Facility: CLINIC | Age: 49
End: 2019-03-13

## 2019-03-13 DIAGNOSIS — M54.16 LUMBAR RADICULOPATHY: Primary | ICD-10-CM

## 2019-03-13 NOTE — TELEPHONE ENCOUNTER
[facility] Call Center    Phone Message    May a detailed message be left on voicemail: yes    Reason for Call: Symptoms or Concerns.     If patient has red-flag symptoms, warm transfer to triage line    Current symptom or concern: Back pain, patient last epidural injection was 11/13/2018 and patient is requesting a new order to be placed to get another epidural injection scheduled. Please advise.    Symptoms have been present for:  2 week(s)    Has patient previously been seen for this? Yes    By: Dr. Mims    Date: 10/18/2019    Are there any new or worsening symptoms? Yes:        Action Taken: Message routed to:  Adult Clinics: Sports Medicine p [phone]

## 2019-03-19 NOTE — TELEPHONE ENCOUNTER
Per huddle with Dr. Mims, another epidural steroid injection was order today. The patient will be contacted to schedule the injection and a follow up with Dr. Mims 2 weeks after the injection is complete.

## 2019-04-01 ENCOUNTER — ANCILLARY PROCEDURE (OUTPATIENT)
Dept: GENERAL RADIOLOGY | Facility: CLINIC | Age: 49
End: 2019-04-01
Attending: FAMILY MEDICINE
Payer: COMMERCIAL

## 2019-04-01 VITALS — SYSTOLIC BLOOD PRESSURE: 127 MMHG | DIASTOLIC BLOOD PRESSURE: 81 MMHG

## 2019-04-01 DIAGNOSIS — M54.16 LUMBAR RADICULOPATHY: ICD-10-CM

## 2019-04-01 PROCEDURE — 62323 NJX INTERLAMINAR LMBR/SAC: CPT | Performed by: RADIOLOGY

## 2019-04-01 RX ORDER — METHYLPREDNISOLONE ACETATE 80 MG/ML
80 INJECTION, SUSPENSION INTRA-ARTICULAR; INTRALESIONAL; INTRAMUSCULAR; SOFT TISSUE ONCE
Status: COMPLETED | OUTPATIENT
Start: 2019-04-01 | End: 2019-04-01

## 2019-04-01 RX ORDER — IOPAMIDOL 408 MG/ML
20 INJECTION, SOLUTION INTRATHECAL ONCE
Status: COMPLETED | OUTPATIENT
Start: 2019-04-01 | End: 2019-04-01

## 2019-04-01 RX ORDER — BUPIVACAINE HYDROCHLORIDE 5 MG/ML
5 INJECTION, SOLUTION EPIDURAL; INTRACAUDAL ONCE
Status: COMPLETED | OUTPATIENT
Start: 2019-04-01 | End: 2019-04-01

## 2019-04-01 RX ORDER — LIDOCAINE HYDROCHLORIDE 10 MG/ML
5 INJECTION, SOLUTION EPIDURAL; INFILTRATION; INTRACAUDAL; PERINEURAL ONCE
Status: COMPLETED | OUTPATIENT
Start: 2019-04-01 | End: 2019-04-01

## 2019-04-01 RX ADMIN — IOPAMIDOL 3 ML: 408 INJECTION, SOLUTION INTRATHECAL at 15:42

## 2019-04-01 RX ADMIN — LIDOCAINE HYDROCHLORIDE 3 ML: 10 INJECTION, SOLUTION EPIDURAL; INFILTRATION; INTRACAUDAL; PERINEURAL at 15:43

## 2019-04-01 RX ADMIN — BUPIVACAINE HYDROCHLORIDE 10 MG: 5 INJECTION, SOLUTION EPIDURAL; INTRACAUDAL at 15:42

## 2019-04-01 RX ADMIN — METHYLPREDNISOLONE ACETATE 80 MG: 80 INJECTION, SUSPENSION INTRA-ARTICULAR; INTRALESIONAL; INTRAMUSCULAR; SOFT TISSUE at 15:42

## 2019-04-01 NOTE — PLAN OF CARE
Tl was seen in X-ray today for a lumbar epidural injection. Patient rated pain before procedure 3/10. After procedure patient rated pain 0/10. This pain level is acceptable to patient. Patient discharged home with his wife La.

## 2019-04-01 NOTE — PLAN OF CARE
Post procedure Instructions for Joint Aspirations:  o You may have some numbness and/or tingling in the area. This will go away within a couple hours.  o You may feel some tightness in the joint. This should only last a few hours.  o Try to rest for the next 12 hours. You can go back to normal activities the day after your procedure.  o Notify your primary doctor if you have any questions or concerns.   o If you have urgent concerns after hours regarding your procedure you can call [phone] and ask for the Radiologist on call.

## 2019-04-15 ENCOUNTER — OFFICE VISIT (OUTPATIENT)
Dept: ORTHOPEDICS | Facility: CLINIC | Age: 49
End: 2019-04-15
Payer: COMMERCIAL

## 2019-04-15 VITALS — BODY MASS INDEX: 31.1 KG/M2 | WEIGHT: 210 LBS | HEIGHT: 69 IN

## 2019-04-15 DIAGNOSIS — G89.29 CHRONIC BILATERAL LOW BACK PAIN WITH RIGHT-SIDED SCIATICA: Primary | ICD-10-CM

## 2019-04-15 DIAGNOSIS — M54.41 CHRONIC BILATERAL LOW BACK PAIN WITH RIGHT-SIDED SCIATICA: Primary | ICD-10-CM

## 2019-04-15 PROCEDURE — 99213 OFFICE O/P EST LOW 20 MIN: CPT | Performed by: FAMILY MEDICINE

## 2019-04-15 ASSESSMENT — MIFFLIN-ST. JEOR: SCORE: 1811.89

## 2019-04-15 ASSESSMENT — PAIN SCALES - GENERAL: PAINLEVEL: NO PAIN (0)

## 2019-04-15 NOTE — PROGRESS NOTES
"HISTORY OF PRESENT ILLNESS  Mr. Sands is a pleasant 48 year old year old male following up with lumbar radiculopathy.  Kaz had an injection a couple of weeks ago, this was a repeat injection at the L3-4 right interlaminar space.  Fortunately this took care of his issues completely.  He was able to golf 5 rounds in a week while in [place] last week.  He feels great today.  Additional history: as documented      REVIEW OF SYSTEMS (4/15/2019)  10 point ROS of systems including Constitutional, Eyes, Respiratory, Cardiovascular, Gastroenterology, Genitourinary, Integumentary, Musculoskeletal, Psychiatric were all negative except for pertinent positives noted in my HPI.     PHYSICAL EXAM  Vitals:    04/15/19 1620   Weight: 95.3 kg (210 lb)   Height: 1.759 m (5' 9.25\")       ASSESSMENT & PLAN  Mr. Sands is a 48 year old year old male following up with lumbar radiulopathy.    I reviewed his injection film in the room with him.    I am happy he is doing well.  We did discuss management moving forward which could include repeat injections 3-4 times a year if helpful, hopefully less frequently than this.    Kaz is going to get in touch with me if his symptoms return.  If this is the case I would likely order an MRI prior to repeating this, as his last MRI was more than 2 years ago.    Alternatively we could consult our surgical partners for their thoughts on operative management.    It was a pleasure seeing Kaz.    20 minutes was spent in the room, 15 of which was spent on counseling and coordination of care.        Thai Mims, DO, CAQSM        "

## 2019-04-15 NOTE — NURSING NOTE
"Tl Sands's chief complaint for this visit includes:  Chief Complaint   Patient presents with     RECHECK     follow up after a lumbar JESUS  4/1/19     PCP: Cathryn Leung    Referring Provider:  No referring provider defined for this encounter.    Ht 1.759 m (5' 9.25\")   Wt 95.3 kg (210 lb)   BMI 30.79 kg/m    No Pain (0)     Do you need any medication refills at today's visit? no      "

## 2019-04-15 NOTE — LETTER
"    4/15/2019         RE: Tl Sands  [address]  [facility] [address]        Dear Colleague,    Thank you for referring your patient, Tl Sands, to the [facility]. Please see a copy of my visit note below.    HISTORY OF PRESENT ILLNESS  Mr. Sands is a pleasant 48 year old year old male following up with lumbar radiculopathy.  Kaz had an injection a couple of weeks ago, this was a repeat injection at the L3-4 right interlaminar space.  Fortunately this took care of his issues completely.  He was able to golf 5 rounds in a week while in [place] last week.  He feels great today.  Additional history: as documented      REVIEW OF SYSTEMS (4/15/2019)  10 point ROS of systems including Constitutional, Eyes, Respiratory, Cardiovascular, Gastroenterology, Genitourinary, Integumentary, Musculoskeletal, Psychiatric were all negative except for pertinent positives noted in my HPI.     PHYSICAL EXAM  Vitals:    04/15/19 1620   Weight: 95.3 kg (210 lb)   Height: 1.759 m (5' 9.25\")       ASSESSMENT & PLAN  Mr. Sands is a 48 year old year old male following up with lumbar radiulopathy.    I reviewed his injection film in the room with him.    I am happy he is doing well.  We did discuss management moving forward which could include repeat injections 3-4 times a year if helpful, hopefully less frequently than this.    Kaz is going to get in touch with me if his symptoms return.  If this is the case I would likely order an MRI prior to repeating this, as his last MRI was more than 2 years ago.    Alternatively we could consult our surgical partners for their thoughts on operative management.    It was a pleasure seeing Kaz.    20 minutes was spent in the room, 15 of which was spent on counseling and coordination of care.        Thai Mims DO, CAM          Again, thank you for allowing me to participate in the care of your patient.        Sincerely,        Thai Mims DO    "

## 2019-05-01 DIAGNOSIS — E10.9 DM W/O COMPLICATION TYPE I (H): ICD-10-CM

## 2019-05-01 NOTE — TELEPHONE ENCOUNTER
Faxed refill request received from   [facility] [phone]  [facility] [address]  Phone: [phone] Fax: [phone].   Medication Requested: B-D UF short pen needles  Directions: use 1 each subcutaneous 5 times daily  Quantity: 300  Last Office Visit: 8/29/18  Next Appointment Scheduled for: 8/28/19    Phoebe Lira CMA  Adult Endocrinology  [facility]

## 2019-05-22 ENCOUNTER — OFFICE VISIT (OUTPATIENT)
Dept: DERMATOLOGY | Facility: CLINIC | Age: 49
End: 2019-05-22
Payer: COMMERCIAL

## 2019-05-22 DIAGNOSIS — D22.9 MULTIPLE BENIGN NEVI: ICD-10-CM

## 2019-05-22 DIAGNOSIS — D18.01 CHERRY ANGIOMA: ICD-10-CM

## 2019-05-22 DIAGNOSIS — D48.9 NEOPLASM OF UNCERTAIN BEHAVIOR: ICD-10-CM

## 2019-05-22 DIAGNOSIS — L81.4 SOLAR LENTIGO: Primary | ICD-10-CM

## 2019-05-22 DIAGNOSIS — L57.8 SUN-DAMAGED SKIN: ICD-10-CM

## 2019-05-22 DIAGNOSIS — L73.8 SENILE SEBACEOUS GLAND HYPERPLASIA: ICD-10-CM

## 2019-05-22 DIAGNOSIS — L85.3 XEROSIS CUTIS: ICD-10-CM

## 2019-05-22 DIAGNOSIS — L82.1 SEBORRHEIC KERATOSIS: ICD-10-CM

## 2019-05-22 PROCEDURE — 88305 TISSUE EXAM BY PATHOLOGIST: CPT | Mod: TC | Performed by: DERMATOLOGY

## 2019-05-22 PROCEDURE — 99203 OFFICE O/P NEW LOW 30 MIN: CPT | Mod: 25 | Performed by: DERMATOLOGY

## 2019-05-22 PROCEDURE — 11102 TANGNTL BX SKIN SINGLE LES: CPT | Performed by: DERMATOLOGY

## 2019-05-22 ASSESSMENT — PAIN SCALES - GENERAL: PAINLEVEL: NO PAIN (0)

## 2019-05-22 NOTE — NURSING NOTE
Tl Sands's goals for this visit include:   Chief Complaint   Patient presents with     Skin Check     Areas of concern on neck and inner thigh, lower eyelid, behind right ear. Other areas on temple and abdomen. Family hx of SC in father and sister. Unknown type.      He requests these members of his care team be copied on today's visit information:     PCP: Catrhyn Leung    Referring Provider:  No referring provider defined for this encounter.    There were no vitals taken for this visit.    Do you need any medication refills at today's visit? No    Vicki Henry LPN
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
## 2019-05-22 NOTE — PATIENT INSTRUCTIONS
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
## 2019-05-23 ENCOUNTER — TRANSFERRED RECORDS (OUTPATIENT)
Dept: HEALTH INFORMATION MANAGEMENT | Facility: CLINIC | Age: 49
End: 2019-05-23

## 2019-05-24 LAB — COPATH REPORT: NORMAL

## 2019-05-28 ENCOUNTER — TELEPHONE (OUTPATIENT)
Dept: DERMATOLOGY | Facility: CLINIC | Age: 49
End: 2019-05-28

## 2019-05-28 NOTE — TELEPHONE ENCOUNTER
Excision Intake                                                    There were no vitals taken for this visit.    Do you take the following medications:  Coumadin, Eliquis, Pradaxa, Xarelto:   NO  -If on Coumadin, INR should be checked within 7 days of surgery.  Range is 3.5 or less or within therapeutic range.  Antibiotic Prophylaxis:  NO    Do you have an iodine allergy:  NO    Past Medical History                                                    Do you currently or have you previously had any of the following conditions:       HIV/AIDS:  NO    Hepatitis:  NO    Suppressed Immune System:  NO    Autoimmune disorder (eg RA, Lupus):  NO    Fever blisters/herpes, cold sores:  NO    Kidney disease:  NO  Creatinine   Date Value Ref Range Status   12/10/2018 0.73 0.66 - 1.25 mg/dL Final   ]    Pacemaker or Defibrillator:  NO.      History of artificial or heart valve replacement:  NO.      Endocarditis: NO    Organ transplant: NO.      Joint replacement within past 2 years: NO    Previous prosthetic joint infection: NO    Diabetes: YES type 1    Pregnant:: N/A    Tobacco use:  NO.    History   Smoking Status     Never Smoker   Smokeless Tobacco     Never Used     Reviewed by:  Carine Shaver LPN

## 2019-05-28 NOTE — TELEPHONE ENCOUNTER
Notes recorded by Carine Shaver LPN on 5/28/2019 at 12:39 PM CDT  Called patient and informed him of diagnosis and procedure. Patient verbalized understanding and had no further questions or concerns at this time. Patient scheduled for Excision with Dr. Ibanez on 6-17-19 at 3pm.    Carine Shaver LPN    ------    Notes recorded by Klarissa Hernandez MD on 5/24/2019 at 4:20 PM CDT  Please let patient know biopsy showed BCC. Refer to Dr. Ibanez for excision. Explain diagnosis and treatment. Next skin check with me in 6 months.    Klarissa Hernandez MD    Department of Dermatology  [facility]: Phone: [phone], Fax:[phone]  [facility]: Phone: [phone], Fax: [phone]       Dermatological path order and indications   Order: [id number]   Status:  Final result   Visible to patient:  Yes (MyChart) Dx:  Neoplasm of uncertain behavior   Component 6d ago   Copath Report Patient Name: VANIA SIMMONS   MR#: [id number]   Specimen #: [id number]   Collected: 5/22/2019   Received: 5/23/2019   Reported: 5/24/2019 16:07   Ordering Phy(s): KLARISSA HERNANDEZ     For improved result formatting, select 'View Enhanced Report Format' under    Linked Documents section.     SPECIMEN(S):   Skin, right upper back     FINAL DIAGNOSIS:   Skin, right upper back:   - Basal cell carcinoma, micronodular type, extending to the lateral and   deep margin - (see description)               Carine Shaver LPN

## 2019-05-28 NOTE — TELEPHONE ENCOUNTER
----- Message from Klarissa Hernández MD sent at 5/24/2019  4:20 PM CDT -----  Please let patient know biopsy showed BCC. Refer to Dr. Ibanez for excision. Explain diagnosis and treatment. Next skin check with me in 6 months.    Klarissa Hernández MD    Department of Dermatology  [facility]: Phone: [phone], Fax:[phone]  [facility]: Phone: [phone], Fax: [phone]

## 2019-06-17 ENCOUNTER — OFFICE VISIT (OUTPATIENT)
Dept: DERMATOLOGY | Facility: CLINIC | Age: 49
End: 2019-06-17
Payer: COMMERCIAL

## 2019-06-17 VITALS — HEART RATE: 71 BPM | OXYGEN SATURATION: 99 % | SYSTOLIC BLOOD PRESSURE: 120 MMHG | DIASTOLIC BLOOD PRESSURE: 78 MMHG

## 2019-06-17 DIAGNOSIS — C44.519 BASAL CELL CARCINOMA (BCC) OF UPPER BACK: Primary | ICD-10-CM

## 2019-06-17 PROCEDURE — 13101 CMPLX RPR TRUNK 2.6-7.5 CM: CPT | Mod: GC | Performed by: DERMATOLOGY

## 2019-06-17 PROCEDURE — 88305 TISSUE EXAM BY PATHOLOGIST: CPT | Mod: TC | Performed by: DERMATOLOGY

## 2019-06-17 PROCEDURE — 11602 EXC TR-EXT MAL+MARG 1.1-2 CM: CPT | Mod: GC | Performed by: DERMATOLOGY

## 2019-06-17 ASSESSMENT — PAIN SCALES - GENERAL: PAINLEVEL: NO PAIN (0)

## 2019-06-17 NOTE — PATIENT INSTRUCTIONS
Excision Wound Care Instructions with Steri strips      I will experience scar, altered skin color, bleeding, swelling, pain, crusting and redness. I may experience altered sensation. Risks are excessive bleeding, infection, muscle weakness, thick (hypertrophic or keloidal) scar, and recurrence. A second procedure may be recommended to obtain the best cosmetic or functional result.    Possible complications of any surgical procedure are bleeding, infection, scarring, alteration in skin color and sensation, muscle weakness in the area, wound dehiscence or seperation, or recurrence of the lesion or disease. On occasion, after healing, a secondary procedure or revision may be recommended in order to obtain the best cosmetic or functional result.       After your surgery, a pressure bandage will be placed over the area that has sutures. This will help prevent bleeding. Please, follow these instructions as they will help you to prevent complications as your wound heals.        For the First 48 hours After Surgery:    1. Leave the pressure bandage on and keep it dry. If it should come loose, you may retape it, but do not take it off.    2. Relax and take it easy. Do not do any vigorous exercise, heavy lifting, or bending forward. This could cause the wound to bleed.    3. Post-operative pain is usually mild. You may take plain or extra strength Tylenol every 4 hours as needed (do not take more than 4,000mg in one day) if you have no liver problems and your doctor says it is okay. Do not take any medicine that contains aspirin, ibuprofen or motrin unless you have been recommended these by a doctor.  Avoid alcohol and vitamin E as these may increase your tendency to bleed.    4. You may put an ice pack around the bandaged area for 20 minutes every 2-3 hours. This may help reduce swelling, bruising, and pain. Make sure the ice pack is waterproof so that the pressure bandage does not get wet.     5. You may see a small amount  of drainage or blood on your pressure bandage. This is normal. However, if drainage or bleeding continues or saturates the bandage, you will need to apply firm pressure over the bandage with a washcloth for 15 minutes. If bleeding continues after applying pressure for 15 minutes then go to the nearest emergency room.        48 Hours After Surgery:    Carefully remove the outer pressure bandage but leave the steri strips in place for these will continue to give the wound edges extra support as they are healing. You may trim the edges of the steri strip that have curled up but do not remove steri strips until they fall off. You may also now shower but do not saturate the wound or steri strips for this will cause your steri strips to fall off. You should cover the steri strips and wound with a wash cloth while showering to protect the area from water. It is ok if the steri strips become a bit damp but do not saturate.     Daily Wound Care:    1. Once the steri strips fall off wash wound with a mild soap and water.  Use caution when washing the wound, be gentle and do not let the forceful shower stream hit the wound directly. DO NOT WASH WITH HYDROGEN PEROXIDE FOR THIS MIGHT CAUSE THE SUTURES TO DISSOLVE FASTER THAN WHAT WE WANT.     2. PAT DRY.    3. Once steri strips fall off apply Vaseline (from a new container or tube) over the suture line with a Q-tip until it is completely healed. It is very important to keep the wound continuously moist, as wounds heal best in a moist environment.    4. Keep the site covered until site is healed, you can cover it with a Telfa (non-stick) dressing and tape or a band-aid. While your steri strips are on you can use them as your bandage.    5. Once steri strips fall off if you are unable to keep wound covered, you must apply Vaseline every 2 - 3 hours (while awake) to ensure it is being kept moist for optimal healing until completely healed. A dressing overnight is recommended to  keep the area moist.        Call Us If:    1. You have pain that is not controlled with Tylenol.    2. You have signs or symptoms of an infection, such as: fever over 100 degrees F, redness, warmth, or foul-smelling or yellow/creamy drainage from the wound.        Who should I call with questions?  [facility]: [phone]   [facility]: [phone]  For urgent needs outside of business hours call the [facility] at [phone] and ask for the dermatology resident on call

## 2019-06-17 NOTE — NURSING NOTE
Tl Sands's goals for this visit include:   Chief Complaint   Patient presents with     Procedure     Excision right upper back BCC       He requests these members of his care team be copied on today's visit information:     PCP: Cathryn Leung    Referring Provider:  No referring provider defined for this encounter.    /78 (BP Location: Right arm, Patient Position: Sitting, Cuff Size: Adult Large)   Pulse 71   SpO2 99%     Do you need any medication refills at today's visit? No    Carine Shaver LPN    Excision Intake                                                    There were no vitals taken for this visit.     Do you take the following medications:  Coumadin, Eliquis, Pradaxa, Xarelto:   NO  -If on Coumadin, INR should be checked within 7 days of surgery.  Range is 3.5 or less or within therapeutic range.  Antibiotic Prophylaxis:  NO     Do you have an iodine allergy:  NO     Past Medical History                                                    Do you currently or have you previously had any of the following conditions:        HIV/AIDS:  NO    Hepatitis:  NO    Suppressed Immune System:  NO    Autoimmune disorder (eg RA, Lupus):  NO    Fever blisters/herpes, cold sores:  NO    Kidney disease:  NO  Creatinine   Date Value Ref Range Status   12/10/2018 0.73 0.66 - 1.25 mg/dL Final   ]    Pacemaker or Defibrillator:  NO.      History of artificial or heart valve replacement:  NO.      Endocarditis: NO    Organ transplant: NO.      Joint replacement within past 2 years: NO    Previous prosthetic joint infection: NO    Diabetes: YES type 1    Pregnant:: N/A    Tobacco use:  NO.        History   Smoking Status     Never Smoker   Smokeless Tobacco     Never Used      Reviewed by:  Carine Shaver LPN

## 2019-06-17 NOTE — NURSING NOTE
Steri strips and pressure dressing applied to excision site on right upper back.  Wound care instructions reviewed with patient and AVS provided.  Patient verbalized understanding.  .  No further questions or concerns at this time.      The following medication was given:     MEDICATION:  Lidocaine with epinephrine 1% 1:[id number]  ROUTE: SQ  SITE: see procedure note  DOSE: 7.5cc  LOT #: [id number]  : Hospira  EXPIRATION DATE: 1-feb-2020  NDC#: 4934-2746-50   Was there drug waste? 1.5cc  Multi-dose vial: Yes    Carine Shaver LPN  June 17, 2019    Prior to injection, verified patient identity using patient's name and date of birth.      Lido with epi 1%    Drug Amount Wasted:  Yes 1.5cc  Vial/Syringe: Multi dose vial  Expiration Date:  1-feb-2020    Carine Shaver LPN

## 2019-06-17 NOTE — PROGRESS NOTES
DERMATOLOGIC EXCISION SURGERY PROCEDURE NOTE     [facility]   [address], [place] [address]     NAME OF PROCEDURE: Excision with complex linear closure  Staff surgeon: Marcos Ibanez DO  Resident: Marco A Ye MD   Scrub nurse: Carine Shaver LPN   PRE-OPERATIVE DIAGNOSIS:  Basal cell carcinoma, micronodular type   POST-OPERATIVE DIAGNOSIS: Same   FINAL EXCISION SIZE(EXCISION DEFECT SIZE): 1.7 x 1.7 cm, with 4 mm margin   FINAL REPAIR LENGTH: 3.5 cm   INDICATIONS: This patient presented with a 0.9 x 0.9 cm basal cell carcinoma of the right upper back. Excision was indicated.   We discussed the principles of treatment and most likely complications including bleeding, infection, scarring, alteration in skin color and sensation, wound dehiscence,muscle weakness in the area, or recurrence of the lesion or disease. We reviewed that on occasion, after healing, a secondary procedure or revision may be recommended in order to obtain the best cosmetic or functional result.     PROCEDURE: The patient was taken to the operative suite. Time-out was performed. The treatment area was anesthetized with 1% lidocaine and epinephrine (1:100,000). The area was washed with Hibiclens, rinsed with saline and draped with sterile towels. The lesion was delineated and excised down to deep subcutaneous fat in an elliptical manner. Hemostasis was obtained by electrocoagulation.     REPAIR: A complex layered linear closure was selected as the procedure which would maximally preserve both function and cosmesis and for the following reasons: 1) the defect was widely undermined; 2) multiple deep plication and layered sutures placed; 3) wound size, depth, tension, and location.   After the excision of the tumor, the area was extensively and carefully undermined using blunt Metzenbaum scissors. Hemostasis was obtained with spot electrocautery and ligation of vessels where necessary. An  initial deep plication sutures of 3-0 Vicryl sutures  were placed in the deep, subcutaneous and fascial planes to appose the lateral margins.  The subcutaneous and dermal layers were then closed with additional 3-0 Vicryl sutures. The epidermis was then carefully approximated along the length of the wound using 4-0 Monocryl running subcuticular sutures.   The final wound length was 3.5 cm. A total of 6.0 ml of anesthesia was administered for all surgical sites. Estimated blood loss was less than 10 ml for all surgical sites. A sterile pressure dressing was applied and wound care instructions, with a written handout, were given. The [facility] alert and ambulatory.    Follow up for wound evaluation as needed.        Staff Physician Comments:   I saw and evaluated the patient with the resident (Dr. Marco A Ye) and I agree with the assessment and plan.   I was physically present and scrubbed for all key portions of the procedure today. I was immediately available at all times.    Marcos Ibanez DO    Department of Dermatology  [facility]: Phone: [phone], Fax:[phone]  [facility]: Phone: [phone], Fax: [phone]    Performed at:   [facility]    [address] [place] [address]

## 2019-06-17 NOTE — LETTER
6/17/2019         RE: Tl Sands  [address]  [facility] [address]        Dear Colleague,    Thank you for referring your patient, Tl Sands, to the [facility]. Please see a copy of my visit note below.    DERMATOLOGIC EXCISION SURGERY PROCEDURE NOTE     [facility]   [address], [place] [address]     NAME OF PROCEDURE: Excision with complex linear closure  Staff surgeon: Marcos Ibanez DO  Resident: Marco A Ye MD   Scrub nurse: Carine Shaver LPN   PRE-OPERATIVE DIAGNOSIS:  Basal cell carcinoma, micronodular type   POST-OPERATIVE DIAGNOSIS: Same   FINAL EXCISION SIZE(EXCISION DEFECT SIZE): 1.7 x 1.7 cm, with 4 mm margin   FINAL REPAIR LENGTH: 3.5 cm   INDICATIONS: This patient presented with a 0.9 x 0.9 cm basal cell carcinoma of the right upper back. Excision was indicated.   We discussed the principles of treatment and most likely complications including bleeding, infection, scarring, alteration in skin color and sensation, wound dehiscence,muscle weakness in the area, or recurrence of the lesion or disease. We reviewed that on occasion, after healing, a secondary procedure or revision may be recommended in order to obtain the best cosmetic or functional result.     PROCEDURE: The patient was taken to the operative suite. Time-out was performed. The treatment area was anesthetized with 1% lidocaine and epinephrine (1:100,000). The area was washed with Hibiclens, rinsed with saline and draped with sterile towels. The lesion was delineated and excised down to deep subcutaneous fat in an elliptical manner. Hemostasis was obtained by electrocoagulation.     REPAIR: A complex layered linear closure was selected as the procedure which would maximally preserve both function and cosmesis and for the following reasons: 1) the defect was widely undermined; 2) multiple deep plication and layered sutures placed; 3) wound  size, depth, tension, and location.   After the excision of the tumor, the area was extensively and carefully undermined using blunt Metzenbaum scissors. Hemostasis was obtained with spot electrocautery and ligation of vessels where necessary. An initial deep plication sutures of 3-0 Vicryl sutures  were placed in the deep, subcutaneous and fascial planes to appose the lateral margins.  The subcutaneous and dermal layers were then closed with additional 3-0 Vicryl sutures. The epidermis was then carefully approximated along the length of the wound using 4-0 Monocryl running subcuticular sutures.   The final wound length was 3.5 cm. A total of 6.0 ml of anesthesia was administered for all surgical sites. Estimated blood loss was less than 10 ml for all surgical sites. A sterile pressure dressing was applied and wound care instructions, with a written handout, were given. The [facility] and [facility]ulatory.    Follow up for wound evaluation as needed.        Staff Physician Comments:   I saw and evaluated the patient with the resident (Dr. Marco A Ye) and I agree with the assessment and plan.   I was physically present and scrubbed for all key portions of the procedure today. I was immediately available at all times.    Marcos Ibanez DO    Department of Dermatology  [facility]: Phone: [phone], Fax:[phone]  [facility]: Phone: [phone], Fax: [phone]    Performed at:   [facility]    [address] [place] [address]    Again, thank you for allowing me to participate in the care of your patient.        Sincerely,        Marcos Ibanez MD

## 2019-06-19 LAB — COPATH REPORT: NORMAL

## 2019-06-21 DIAGNOSIS — E10.9 DM W/O COMPLICATION TYPE I (H): ICD-10-CM

## 2019-06-21 DIAGNOSIS — E78.00 HYPERCHOLESTEREMIA: ICD-10-CM

## 2019-06-21 NOTE — TELEPHONE ENCOUNTER
Faxed refill request received from   [facility] [id number]  [facility] [address]  Phone: [phone] Fax: [phone]  .   BASAGLAR 100 UNIT/ML injection 30 mL 3 8/29/2018  No   Sig - Route: Inject 26 Units Subcutaneous daily - Subcutaneous     Last Office Visit: 08/29/18  Next Appointment Scheduled for: 8/28/19    Phoebe Lira CMA  Adult Endocrinology  [facility]

## 2019-06-27 RX ORDER — ATORVASTATIN CALCIUM 10 MG/1
10 TABLET, FILM COATED ORAL DAILY
Qty: 90 TABLET | Refills: 0 | Status: SHIPPED | OUTPATIENT
Start: 2019-06-27 | End: 2019-08-28

## 2019-06-27 RX ORDER — INSULIN GLARGINE 100 [IU]/ML
INJECTION, SOLUTION SUBCUTANEOUS
Qty: 30 ML | Refills: 0 | Status: SHIPPED | OUTPATIENT
Start: 2019-06-27 | End: 2019-07-01

## 2019-06-28 ENCOUNTER — TELEPHONE (OUTPATIENT)
Dept: ENDOCRINOLOGY | Facility: CLINIC | Age: 49
End: 2019-06-28

## 2019-06-28 DIAGNOSIS — L21.9 SEBORRHEIC DERMATITIS: ICD-10-CM

## 2019-06-28 DIAGNOSIS — E10.9 TYPE 1 DIABETES MELLITUS WITHOUT COMPLICATION (H): Primary | ICD-10-CM

## 2019-06-28 NOTE — TELEPHONE ENCOUNTER
Central Prior Authorization Team   Phone: [phone]    PA Initiation    Medication: insulin glargine (BASAGLAR KWIKPEN) 100 UNIT/ML pen  Insurance Company: [employer] - Phone [phone] Fax [phone]  Pharmacy Filling the Rx: [facility] [address] [place] [address] [address]  Filling Pharmacy Phone: [phone]  Filling Pharmacy Fax:    Start Date: 6/28/2019    PRIOR AUTHORIZATION DENIED    Medication: insulin glargine (BASAGLAR KWIKPEN) 100 UNIT/ML pen    Denial Date: 6/28/2019    Denial Rational: Will document when the denial letter is received from the patient's insurance    Appeal Information: Will document when the denial letter is received from the patient's insurance    Called insurance to have denial letter resent.

## 2019-07-01 RX ORDER — MOMETASONE FUROATE 1 MG/ML
SOLUTION TOPICAL
Qty: 60 ML | Refills: 3 | Status: CANCELLED | OUTPATIENT
Start: 2019-07-01

## 2019-07-01 NOTE — TELEPHONE ENCOUNTER
Will forward to clinic staff to update patient.    Anju Barrios LPN  Diabetes Clinic Coordinator   Adult Endocrinology and Pediatric Specialty Clinics  [facility]

## 2019-07-01 NOTE — TELEPHONE ENCOUNTER
Patient requesting refill Elocon solution.     Follow up appt 8/28.    Medication not on Endocrine Refill Protocol. Will route to Dr. Resendiz for review.    Anju Barrios LPN  Diabetes Clinic Coordinator   Adult Endocrinology and Pediatric Specialty Clinics  [facility]

## 2019-07-02 NOTE — TELEPHONE ENCOUNTER
[facility] Call Center    Phone Message    May a detailed message be left on voicemail: yes    Reason for Call: Other: Patient returning phone call. Please advise.     Action Taken: Message routed to:  Adult Clinics: Endocrinology p [phone]

## 2019-07-02 NOTE — TELEPHONE ENCOUNTER
Left message for pt to call back to clinic.  Please advise patient of below message per Dr Resendiz. Dulce Rebolledo, CMA

## 2019-08-28 ENCOUNTER — OFFICE VISIT (OUTPATIENT)
Dept: ENDOCRINOLOGY | Facility: CLINIC | Age: 49
End: 2019-08-28
Payer: COMMERCIAL

## 2019-08-28 VITALS
SYSTOLIC BLOOD PRESSURE: 111 MMHG | DIASTOLIC BLOOD PRESSURE: 78 MMHG | OXYGEN SATURATION: 97 % | WEIGHT: 212.3 LBS | HEART RATE: 68 BPM | BODY MASS INDEX: 31.13 KG/M2

## 2019-08-28 DIAGNOSIS — E10.9 TYPE 1 DIABETES MELLITUS WITHOUT COMPLICATION (H): Primary | ICD-10-CM

## 2019-08-28 DIAGNOSIS — E78.00 HYPERCHOLESTEREMIA: ICD-10-CM

## 2019-08-28 LAB — HBA1C MFR BLD: 8 % (ref 0–5.6)

## 2019-08-28 PROCEDURE — 83036 HEMOGLOBIN GLYCOSYLATED A1C: CPT | Performed by: INTERNAL MEDICINE

## 2019-08-28 PROCEDURE — 99214 OFFICE O/P EST MOD 30 MIN: CPT | Performed by: INTERNAL MEDICINE

## 2019-08-28 PROCEDURE — 36415 COLL VENOUS BLD VENIPUNCTURE: CPT | Performed by: INTERNAL MEDICINE

## 2019-08-28 RX ORDER — PROCHLORPERAZINE 25 MG/1
1 SUPPOSITORY RECTAL CONTINUOUS
Qty: 1 DEVICE | Refills: 0 | Status: SHIPPED | OUTPATIENT
Start: 2019-08-28 | End: 2020-02-28

## 2019-08-28 RX ORDER — PROCHLORPERAZINE 25 MG/1
1 SUPPOSITORY RECTAL SEE ADMIN INSTRUCTIONS
Qty: 9 EACH | Refills: 3 | Status: SHIPPED | OUTPATIENT
Start: 2019-08-28 | End: 2020-02-28

## 2019-08-28 RX ORDER — ATORVASTATIN CALCIUM 10 MG/1
10 TABLET, FILM COATED ORAL DAILY
Qty: 90 TABLET | Refills: 3 | Status: SHIPPED | OUTPATIENT
Start: 2019-08-28 | End: 2021-08-17

## 2019-08-28 NOTE — PATIENT INSTRUCTIONS
Please call [phone] to schedule an appointment with THANIA Ramos once Dexcom G6 CGM has been approved and/or shipped for teaching.    [facility]: [phone]    [facility]  Dulce Jose RN, Diabetes Educator: [phone]  Anju Barrios LPN Diabetes Clinic Coordinator: [phone]  Clinic Line:[phone]  Clinic Fax: [phone]  On-Call Endocrine Provider at the [place] (after hours/weekends): [phone] option 4  Scheduling Line: [phone]    Appointment Reminders:  * Please bring meter and/or CGM device with for staff to download  * If you are due ONLY for an A1C, it is scheduled with the nurse and will be done in clinic. You do not need to schedule a lab appointment. Fasting is not required for an A1C.  * Refill request should be submitted to your pharmacy. They will contact clinic for approval.

## 2019-08-28 NOTE — LETTER
8/28/2019         RE: Tl Sands  [address]  [facility] [address]        Dear Colleague,    Thank you for referring your patient, Tl Sands, to the [facility]. Please see a copy of my visit note below.      Tl Sands is a 48 year old man seen in follow-up for type 1 diabetes, diagnosed in 2005.    He has not known diabetes complications and his average hemoglobin A1c over the recent years has been around 7 to 7.5. Today, it was 8%, up from 7.2% at his last visit here.     He has been using the rely on meter.  On the meter, his average blood sugar over the last 30 days is 215.  His morning blood sugar is variable between 81 and 309.  At bedtime, some of the numbers are also in the 200s or 300s.  There are no hypoglycemic episodes documented by the meter and the patient denies any.  He admits he has been eating late at night and, sometimes, snacking in the evening.    Current insulin regimen is 26 U Lantus in am, 1 U Novolog per 10 grams CHO and a correction scale of 1 U per 25 mg above 120. Average dose of Novolog for meals is 4-6 U.  In a regular day, he has 3 meals.  Occasionally, he forgets to take NovoLog for lunch, at work.  If he snacks, it happens mainly at night and in the weekends and he admits not taking NovoLog for snacks.    He continues to complain of a dry cough, which has been present for years.  Intermittently, it is associated with voice hoarseness.  He was evaluated by ENT and no clear etiology was identified.  While vacationing in [place], the cough significantly improved.      Diabetes complications:  Last eye exam 2017 (doesn't remember the date)- no DR   No h/o proteinuria   No numbness or tingling sensation   He denies prior episodes of loss of consciousness due to hypoglycemia.  The lowest blood glucose he remembers was 40.  He is able to recognize the hypoglycemic episodes.  In the 60s, he gets sweaty, weak, and he develops shortness of  breath.  He has glucagon at home and his family members know how to use it.  Exercise: Summertime, he plays golf once a week.     He has been compliant in taking atorvastatin at a dose of 10 mg, 2-3 times a week.      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   Type 1 diabetes   Tinea pedis   Finger fracture   ED    PSM:  B/L inguinal hernia repair   B/L arthroscopic knee surgery        Current Outpatient Medications:      atorvastatin (LIPITOR) 10 MG tablet, Take 1 tablet (10 mg) by mouth daily, Disp: 90 tablet, Rfl: 0     econazole nitrate 1 % cream, Apply topically 2 times daily, Disp: 60 g, Rfl: 11     glucose blood VI test strips strip, Test 4-5 times daily, Disp: 400 strip, Rfl: 3     ibuprofen (ADVIL/MOTRIN) 200 MG tablet, Take 400-600 mg by mouth as needed, Disp: , Rfl:      insulin glargine (LANTUS SOLOSTAR PEN) 100 UNIT/ML pen, Inject 26 Units Subcutaneous every morning, Disp: 30 mL, Rfl: 0     insulin lispro (HUMALOG KWIKPEN) 100 UNIT/ML injection, INJECT 5-10 UNITS SUBCUTANEOUSLY BEFORE MEAL(S), total daily dose up to 30 U, Disp: 30 mL, Rfl: 3     insulin pen needle (B-D U/F) 31G X 8 MM miscellaneous, USE ONE  FIVE TIMES DAILY (TO  BE  USED  WITH  INSULIN  PEN,  3  MEALS  AND  AT  BEDTIME), Disp: 300 each, Rfl: 1     LANCETS REGULAR MISC, 1 Device 4 times daily., Disp: 360 each, Rfl: 3     mometasone (ELOCON) 0.1 % solution (lotion), APPLY SOLUTION TOPICALLY TWICE DAILY, Disp: 60 mL, Rfl: 3     omeprazole (PRILOSEC) 20 MG DR capsule, Take 1 capsule (20 mg) by mouth daily, Disp: 30 capsule, Rfl: 1     tadalafil (CIALIS) 20 MG tablet, Take 1 tablet by mouth. As needed, Disp: 20 tablet, Rfl: prn     Continuous Blood Gluc  (DEXCOM G6 ) TYLOR, 1 each continuous (Patient not taking: Reported on 8/28/2019), Disp: 1 Device, Rfl: 0     Continuous Blood Gluc Sensor (DEXCOM G6 SENSOR) MISC, Inject 1 each Subcutaneous See Admin Instructions Change sensor every 10 days (Patient not taking:  Reported on 8/28/2019), Disp: 9 each, Rfl: 3     Continuous Blood Gluc Transmit (DEXCOM G6 TRANSMITTER) MISC, 1 each every 3 months (Patient not taking: Reported on 8/28/2019), Disp: 1 each, Rfl: 1    HABITS:  He does not use tobacco or alcohol.      SOCIAL HISTORY:  He is  and lives with his wife and children in [place]. 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      Family history  Maternal grandfather - type 1 diabetes. Paternal uncle also has type 1 diabetes. No f/h of thyroid disease. Paternal uncle - colon cancer.     ROS:  Torn his left biceps - pain improving   LBP - better with steroid shots   Right temporomandibular joint pain - follows up with his dentist  10 point ROS neg other than above    PHYSICAL EXAMINATION:   Wt Readings from Last 10 Encounters:   08/28/19 96.3 kg (212 lb 4.8 oz)   04/15/19 95.3 kg (210 lb)   12/10/18 (P) 95.3 kg (210 lb)   10/18/18 97.5 kg (215 lb)   10/01/18 97.5 kg (215 lb)   09/25/18 97.8 kg (215 lb 11.2 oz)   08/29/18 97.3 kg (214 lb 8.1 oz)   12/07/17 95.3 kg (210 lb)   11/02/17 95.7 kg (211 lb)   07/26/17 95.1 kg (209 lb 10.5 oz)     /78 (BP Location: Left arm, Patient Position: Sitting, Cuff Size: Adult Large)   Pulse 68   Wt 96.3 kg (212 lb 4.8 oz)   SpO2 97%   BMI 31.13 kg/m       BP Readings from Last 6 Encounters:   06/17/19 120/78   04/01/19 127/81   12/18/18 121/78   12/10/18 (P) 130/85   11/13/18 125/70   10/18/18 131/76     GENERAL:  A healthy-appearing man.   EYES:  Extraocular movements are intact.  Sclerae are clear.  No retinopathy appreciated.   NECK:  No goiter, bruit or adenopathy.   CHEST:  Clear to auscultation.   CARDIOVASCULAR:  Regular rate and rhythm.  No murmur.   ABDOMEN:  Soft.  No hepatomegaly or lipohypertrophy.   EXTREMITIES:  No pretibial edema. Ankle jerks absent bilaterally.    GI: Abdomen soft, nontender, nondistended, positive bowel sounds   Musculoskeletal: Normal tone and muscle mass  SKIN: no lipodystrophy at the  site of insulin injections (he mainly injects the right posterior buttock).  Feet: Sensation intact to monofilament testing with the exception of the heels, where mild callus is present     LABORATORY TESTS:   I reviewed prior lab results documented in Epic.   Lab Results   Component Value Date    A1C 8.0 (A) 08/28/2019    A1C 7.2 08/29/2018    A1C 7.1 (H) 07/26/2017    A1C 7.6 09/13/2016    A1C 7.5 (H) 03/15/2016       Hemoglobin   Date Value Ref Range Status   12/10/2018 14.9 13.3 - 17.7 g/dL Final     Hematocrit   Date Value Ref Range Status   12/10/2018 44.5 40.0 - 53.0 % Final     Cholesterol   Date Value Ref Range Status   08/29/2018 146 <200 mg/dL Final     Cholesterol/HDL Ratio   Date Value Ref Range Status   03/17/2015 3.2 0.0 - 5.0 Final     HDL Cholesterol   Date Value Ref Range Status   08/29/2018 51 >39 mg/dL Final     LDL Cholesterol Calculated   Date Value Ref Range Status   08/29/2018 85 <100 mg/dL Final     Comment:     Desirable:       <100 mg/dl     VLDL-Cholesterol   Date Value Ref Range Status   03/17/2015 23 0 - 30 mg/dL Final     Triglycerides   Date Value Ref Range Status   08/29/2018 49 <150 mg/dL Final     Comment:     Fasting specimen     Albumin Urine mg/L   Date Value Ref Range Status   08/29/2018 9 mg/L Final     TSH   Date Value Ref Range Status   07/26/2017 1.37 0.40 - 4.00 mU/L Final         Last Basic Metabolic Panel:    Sodium   Date Value Ref Range Status   12/10/2018 141 133 - 144 mmol/L Final     Potassium   Date Value Ref Range Status   12/10/2018 3.9 3.4 - 5.3 mmol/L Final     Chloride   Date Value Ref Range Status   12/10/2018 106 94 - 109 mmol/L Final     Calcium   Date Value Ref Range Status   12/10/2018 9.1 8.5 - 10.1 mg/dL Final     Carbon Dioxide   Date Value Ref Range Status   12/10/2018 29 20 - 32 mmol/L Final     Urea Nitrogen   Date Value Ref Range Status   12/10/2018 15 7 - 30 mg/dL Final     Creatinine   Date Value Ref Range Status   12/10/2018 0.73 0.66 - 1.25  mg/dL Final     GFR Estimate   Date Value Ref Range Status   12/10/2018 >90 >60 mL/min/1.7m2 Final     Comment:     Non  GFR Calc     Glucose   Date Value Ref Range Status   12/10/2018 144 (H) 70 - 99 mg/dL Final     Comment:     Non Fasting       AST   Date Value Ref Range Status   08/29/2018 17 0 - 45 U/L Final     ALT   Date Value Ref Range Status   08/29/2018 22 0 - 70 U/L Final     Albumin   Date Value Ref Range Status   08/29/2018 3.4 3.4 - 5.0 g/dL Final       AST   Date Value Ref Range Status   08/29/2018 17 0 - 45 U/L Final     ALT   Date Value Ref Range Status   08/29/2018 22 0 - 70 U/L Final     Albumin   Date Value Ref Range Status   08/29/2018 3.4 3.4 - 5.0 g/dL Final       Assessment and plan:    1.  Type 1 diabetes mellitus, suboptimally controlled, with no known diabetes complications.  Based on the insulin dosages and blood sugar numbers, I suspect he requires more NovoLog with meals and less long-acting insulin.  Long-term, I think he is going to benefit from the use of the Dexcom sensor.  I counseled the patient on the use of the sensor, the alarm features, the option of recording the carbohydrate intake, the insulin dose and the timing of exercise.  Recommendations:  Check blood glucose always before meals and at bedtime  Always take NovoLog before meals and snacks, unless they are followed by physical exercise  Use an insulin to carb ratio of 1 U per 8 grams CHO   Follow-up urine microalbumin  Schedule a comprehensive eye exam.     2. Hypercholesterolemia, on atorvastatin taken 2-3 times a week.  Follow-up lipid panel.     3.  Blood pressure, controlled.     Orders Placed This Encounter   Procedures     Hemoglobin A1c POCT     Albumin Random Urine Quantitative with Creat Ratio     Lipid panel reflex to direct LDL Fasting     OPHTHALMOLOGY ADULT REFERRAL        Again, thank you for allowing me to participate in the care of your patient.        Sincerely,        Rupali Whitehead  MD Astrid

## 2019-08-28 NOTE — NURSING NOTE
Tl Sands's goals for this visit include:   Chief Complaint   Patient presents with     Diabetes     He requests these members of his care team be copied on today's visit information: Yes    PCP: Cathryn Leung    Referring Provider:  Cathryn Leung MD  [address] [address]    /78 (BP Location: Left arm, Patient Position: Sitting, Cuff Size: Adult Large)   Pulse 68   Wt 96.3 kg (212 lb 4.8 oz)   SpO2 97%   BMI 31.13 kg/m      Do you need any medication refills at today's visit? No

## 2019-08-28 NOTE — PROGRESS NOTES
Tl Sands is a 48 year old man seen in follow-up for type 1 diabetes, diagnosed in 2005.    He has not known diabetes complications and his average hemoglobin A1c over the recent years has been around 7 to 7.5. Today, it was 8%, up from 7.2% at his last visit here.     He has been using the rely on meter.  On the meter, his average blood sugar over the last 30 days is 215.  His morning blood sugar is variable between 81 and 309.  At bedtime, some of the numbers are also in the 200s or 300s.  There are no hypoglycemic episodes documented by the meter and the patient denies any.  He admits he has been eating late at night and, sometimes, snacking in the evening.    Current insulin regimen is 26 U Lantus in am, 1 U Novolog per 10 grams CHO and a correction scale of 1 U per 25 mg above 120. Average dose of Novolog for meals is 4-6 U.  In a regular day, he has 3 meals.  Occasionally, he forgets to take NovoLog for lunch, at work.  If he snacks, it happens mainly at night and in the weekends and he admits not taking NovoLog for snacks.    He continues to complain of a dry cough, which has been present for years.  Intermittently, it is associated with voice hoarseness.  He was evaluated by ENT and no clear etiology was identified.  While vacationing in [place], the cough significantly improved.      Diabetes complications:  Last eye exam 2017 (doesn't remember the date)- no DR   No h/o proteinuria   No numbness or tingling sensation   He denies prior episodes of loss of consciousness due to hypoglycemia.  The lowest blood glucose he remembers was 40.  He is able to recognize the hypoglycemic episodes.  In the 60s, he gets sweaty, weak, and he develops shortness of breath.  He has glucagon at home and his family members know how to use it.  Exercise: Summertime, he plays golf once a week.     He has been compliant in taking atorvastatin at a dose of 10 mg, 2-3 times a week.      PMH:   Psoriasis limited to the  postauricular area   Type 1 diabetes   Tinea pedis   Finger fracture   ED    PSM:  B/L inguinal hernia repair   B/L arthroscopic knee surgery        Current Outpatient Medications:      atorvastatin (LIPITOR) 10 MG tablet, Take 1 tablet (10 mg) by mouth daily, Disp: 90 tablet, Rfl: 0     econazole nitrate 1 % cream, Apply topically 2 times daily, Disp: 60 g, Rfl: 11     glucose blood VI test strips strip, Test 4-5 times daily, Disp: 400 strip, Rfl: 3     ibuprofen (ADVIL/MOTRIN) 200 MG tablet, Take 400-600 mg by mouth as needed, Disp: , Rfl:      insulin glargine (LANTUS SOLOSTAR PEN) 100 UNIT/ML pen, Inject 26 Units Subcutaneous every morning, Disp: 30 mL, Rfl: 0     insulin lispro (HUMALOG KWIKPEN) 100 UNIT/ML injection, INJECT 5-10 UNITS SUBCUTANEOUSLY BEFORE MEAL(S), total daily dose up to 30 U, Disp: 30 mL, Rfl: 3     insulin pen needle (B-D U/F) 31G X 8 MM miscellaneous, USE ONE  FIVE TIMES DAILY (TO  BE  USED  WITH  INSULIN  PEN,  3  MEALS  AND  AT  BEDTIME), Disp: 300 each, Rfl: 1     LANCETS REGULAR MISC, 1 Device 4 times daily., Disp: 360 each, Rfl: 3     mometasone (ELOCON) 0.1 % solution (lotion), APPLY SOLUTION TOPICALLY TWICE DAILY, Disp: 60 mL, Rfl: 3     omeprazole (PRILOSEC) 20 MG DR capsule, Take 1 capsule (20 mg) by mouth daily, Disp: 30 capsule, Rfl: 1     tadalafil (CIALIS) 20 MG tablet, Take 1 tablet by mouth. As needed, Disp: 20 tablet, Rfl: prn     Continuous Blood Gluc  (DEXCOM G6 ) TYLOR, 1 each continuous (Patient not taking: Reported on 8/28/2019), Disp: 1 Device, Rfl: 0     Continuous Blood Gluc Sensor (DEXCOM G6 SENSOR) MISC, Inject 1 each Subcutaneous See Admin Instructions Change sensor every 10 days (Patient not taking: Reported on 8/28/2019), Disp: 9 each, Rfl: 3     Continuous Blood Gluc Transmit (DEXCOM G6 TRANSMITTER) MISC, 1 each every 3 months (Patient not taking: Reported on 8/28/2019), Disp: 1 each, Rfl: 1    HABITS:  He does not use tobacco or alcohol.       SOCIAL HISTORY:  He is  and lives with his wife and children in [place]. 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      Family history  Maternal grandfather - type 1 diabetes. Paternal uncle also has type 1 diabetes. No f/h of thyroid disease. Paternal uncle - colon cancer.     ROS:  Torn his left biceps - pain improving   LBP - better with steroid shots   Right temporomandibular joint pain - follows up with his dentist  10 point ROS neg other than above    PHYSICAL EXAMINATION:   Wt Readings from Last 10 Encounters:   08/28/19 96.3 kg (212 lb 4.8 oz)   04/15/19 95.3 kg (210 lb)   12/10/18 (P) 95.3 kg (210 lb)   10/18/18 97.5 kg (215 lb)   10/01/18 97.5 kg (215 lb)   09/25/18 97.8 kg (215 lb 11.2 oz)   08/29/18 97.3 kg (214 lb 8.1 oz)   12/07/17 95.3 kg (210 lb)   11/02/17 95.7 kg (211 lb)   07/26/17 95.1 kg (209 lb 10.5 oz)     /78 (BP Location: Left arm, Patient Position: Sitting, Cuff Size: Adult Large)   Pulse 68   Wt 96.3 kg (212 lb 4.8 oz)   SpO2 97%   BMI 31.13 kg/m      BP Readings from Last 6 Encounters:   06/17/19 120/78   04/01/19 127/81   12/18/18 121/78   12/10/18 (P) 130/85   11/13/18 125/70   10/18/18 131/76     GENERAL:  A healthy-appearing man.   EYES:  Extraocular movements are intact.  Sclerae are clear.  No retinopathy appreciated.   NECK:  No goiter, bruit or adenopathy.   CHEST:  Clear to auscultation.   CARDIOVASCULAR:  Regular rate and rhythm.  No murmur.   ABDOMEN:  Soft.  No hepatomegaly or lipohypertrophy.   EXTREMITIES:  No pretibial edema. Ankle jerks absent bilaterally.    GI: Abdomen soft, nontender, nondistended, positive bowel sounds   Musculoskeletal: Normal tone and muscle mass  SKIN: no lipodystrophy at the site of insulin injections (he mainly injects the right posterior buttock).  Feet: Sensation intact to monofilament testing with the exception of the heels, where mild callus is present     LABORATORY TESTS:   I reviewed prior lab results documented in  [facility].   Lab Results   Component Value Date    A1C 8.0 (A) 08/28/2019    A1C 7.2 08/29/2018    A1C 7.1 (H) 07/26/2017    A1C 7.6 09/13/2016    A1C 7.5 (H) 03/15/2016       Hemoglobin   Date Value Ref Range Status   12/10/2018 14.9 13.3 - 17.7 g/dL Final     Hematocrit   Date Value Ref Range Status   12/10/2018 44.5 40.0 - 53.0 % Final     Cholesterol   Date Value Ref Range Status   08/29/2018 146 <200 mg/dL Final     Cholesterol/HDL Ratio   Date Value Ref Range Status   03/17/2015 3.2 0.0 - 5.0 Final     HDL Cholesterol   Date Value Ref Range Status   08/29/2018 51 >39 mg/dL Final     LDL Cholesterol Calculated   Date Value Ref Range Status   08/29/2018 85 <100 mg/dL Final     Comment:     Desirable:       <100 mg/dl     VLDL-Cholesterol   Date Value Ref Range Status   03/17/2015 23 0 - 30 mg/dL Final     Triglycerides   Date Value Ref Range Status   08/29/2018 49 <150 mg/dL Final     Comment:     Fasting specimen     Albumin Urine mg/L   Date Value Ref Range Status   08/29/2018 9 mg/L Final     TSH   Date Value Ref Range Status   07/26/2017 1.37 0.40 - 4.00 mU/L Final         Last Basic Metabolic Panel:    Sodium   Date Value Ref Range Status   12/10/2018 141 133 - 144 mmol/L Final     Potassium   Date Value Ref Range Status   12/10/2018 3.9 3.4 - 5.3 mmol/L Final     Chloride   Date Value Ref Range Status   12/10/2018 106 94 - 109 mmol/L Final     Calcium   Date Value Ref Range Status   12/10/2018 9.1 8.5 - 10.1 mg/dL Final     Carbon Dioxide   Date Value Ref Range Status   12/10/2018 29 20 - 32 mmol/L Final     Urea Nitrogen   Date Value Ref Range Status   12/10/2018 15 7 - 30 mg/dL Final     Creatinine   Date Value Ref Range Status   12/10/2018 0.73 0.66 - 1.25 mg/dL Final     GFR Estimate   Date Value Ref Range Status   12/10/2018 >90 >60 mL/min/1.7m2 Final     Comment:     Non  GFR Calc     Glucose   Date Value Ref Range Status   12/10/2018 144 (H) 70 - 99 mg/dL Final     Comment:     Non  Fasting       AST   Date Value Ref Range Status   08/29/2018 17 0 - 45 U/L Final     ALT   Date Value Ref Range Status   08/29/2018 22 0 - 70 U/L Final     Albumin   Date Value Ref Range Status   08/29/2018 3.4 3.4 - 5.0 g/dL Final       AST   Date Value Ref Range Status   08/29/2018 17 0 - 45 U/L Final     ALT   Date Value Ref Range Status   08/29/2018 22 0 - 70 U/L Final     Albumin   Date Value Ref Range Status   08/29/2018 3.4 3.4 - 5.0 g/dL Final       Assessment and plan:    1.  Type 1 diabetes mellitus, suboptimally controlled, with no known diabetes complications.  Based on the insulin dosages and blood sugar numbers, I suspect he requires more NovoLog with meals and less long-acting insulin.  Long-term, I think he is going to benefit from the use of the Dexcom sensor.  I counseled the patient on the use of the sensor, the alarm features, the option of recording the carbohydrate intake, the insulin dose and the timing of exercise.  Recommendations:  Check blood glucose always before meals and at bedtime  Always take NovoLog before meals and snacks, unless they are followed by physical exercise  Use an insulin to carb ratio of 1 U per 8 grams CHO   Follow-up urine microalbumin  Schedule a comprehensive eye exam.     2. Hypercholesterolemia, on atorvastatin taken 2-3 times a week.  Follow-up lipid panel.     3.  Blood pressure, controlled.     Orders Placed This Encounter   Procedures     Hemoglobin A1c POCT     Albumin Random Urine Quantitative with Creat Ratio     Lipid panel reflex to direct LDL Fasting     OPHTHALMOLOGY ADULT REFERRAL

## 2019-08-30 DIAGNOSIS — E10.9 DM W/O COMPLICATION TYPE I (H): ICD-10-CM

## 2019-08-30 RX ORDER — PROCHLORPERAZINE 25 MG/1
1 SUPPOSITORY RECTAL
Qty: 1 EACH | Refills: 1 | Status: SHIPPED | OUTPATIENT
Start: 2019-08-30 | End: 2020-02-28

## 2019-09-09 ENCOUNTER — TELEPHONE (OUTPATIENT)
Dept: OPHTHALMOLOGY | Facility: CLINIC | Age: 49
End: 2019-09-09

## 2019-09-09 NOTE — TELEPHONE ENCOUNTER
9/9 Explained we need to reschedule appointment on 9/16. Instructed patient to call [phone] to reschedule.     Yasmin Mosley   Procedure    Ortho/Sports Med/Ent/Eye   [facility]   [phone]

## 2019-09-11 ENCOUNTER — TELEPHONE (OUTPATIENT)
Dept: OPHTHALMOLOGY | Facility: CLINIC | Age: 49
End: 2019-09-11

## 2019-09-11 NOTE — TELEPHONE ENCOUNTER
9/11 called and explained we need to reschedule appointment on 9/16 with Dr. Morrison. Instructed patient to call [phone] to reschedule.     Yasmin Mosley   Procedure    Ortho/Sports Med/Ent/Eye   [facility]   [phone]

## 2019-10-15 DIAGNOSIS — E10.9 DM W/O COMPLICATION TYPE I (H): ICD-10-CM

## 2019-10-16 DIAGNOSIS — E10.9 TYPE 1 DIABETES MELLITUS WITHOUT COMPLICATION (H): ICD-10-CM

## 2019-10-16 DIAGNOSIS — E78.00 HYPERCHOLESTEREMIA: ICD-10-CM

## 2019-10-16 LAB
CHOLEST SERPL-MCNC: 166 MG/DL
CREAT UR-MCNC: 165 MG/DL
HDLC SERPL-MCNC: 56 MG/DL
LDLC SERPL CALC-MCNC: 99 MG/DL
MICROALBUMIN UR-MCNC: 7 MG/L
MICROALBUMIN/CREAT UR: 4.09 MG/G CR (ref 0–17)
NONHDLC SERPL-MCNC: 110 MG/DL
TRIGL SERPL-MCNC: 56 MG/DL

## 2019-10-16 PROCEDURE — 82043 UR ALBUMIN QUANTITATIVE: CPT | Performed by: INTERNAL MEDICINE

## 2019-10-16 PROCEDURE — 36415 COLL VENOUS BLD VENIPUNCTURE: CPT | Performed by: INTERNAL MEDICINE

## 2019-10-16 PROCEDURE — 80061 LIPID PANEL: CPT | Performed by: INTERNAL MEDICINE

## 2019-10-16 RX ORDER — INSULIN LISPRO 100 [IU]/ML
INJECTION, SOLUTION INTRAVENOUS; SUBCUTANEOUS
Qty: 15 ML | Refills: 3 | Status: SHIPPED | OUTPATIENT
Start: 2019-10-16 | End: 2020-03-16

## 2019-11-22 ENCOUNTER — ANCILLARY PROCEDURE (OUTPATIENT)
Dept: ULTRASOUND IMAGING | Facility: CLINIC | Age: 49
End: 2019-11-22
Attending: FAMILY MEDICINE
Payer: COMMERCIAL

## 2019-11-22 ENCOUNTER — OFFICE VISIT (OUTPATIENT)
Dept: PEDIATRICS | Facility: CLINIC | Age: 49
End: 2019-11-22
Payer: COMMERCIAL

## 2019-11-22 ENCOUNTER — TELEPHONE (OUTPATIENT)
Dept: PEDIATRICS | Facility: CLINIC | Age: 49
End: 2019-11-22

## 2019-11-22 VITALS
HEART RATE: 86 BPM | WEIGHT: 218.8 LBS | SYSTOLIC BLOOD PRESSURE: 130 MMHG | OXYGEN SATURATION: 98 % | HEIGHT: 70 IN | BODY MASS INDEX: 31.32 KG/M2 | TEMPERATURE: 97.8 F | DIASTOLIC BLOOD PRESSURE: 82 MMHG

## 2019-11-22 DIAGNOSIS — N50.89 TESTICLE LUMP: ICD-10-CM

## 2019-11-22 DIAGNOSIS — R36.1 HEMATOSPERMIA: Primary | ICD-10-CM

## 2019-11-22 DIAGNOSIS — I86.1 LEFT VARICOCELE: ICD-10-CM

## 2019-11-22 PROBLEM — R03.0 ELEVATED BLOOD PRESSURE READING WITHOUT DIAGNOSIS OF HYPERTENSION: Status: RESOLVED | Noted: 2018-08-29 | Resolved: 2019-11-22

## 2019-11-22 PROBLEM — L03.115 CELLULITIS OF RIGHT LOWER EXTREMITY: Status: ACTIVE | Noted: 2018-03-04

## 2019-11-22 PROBLEM — L03.115 CELLULITIS OF RIGHT LOWER EXTREMITY: Status: RESOLVED | Noted: 2018-03-04 | Resolved: 2019-11-22

## 2019-11-22 LAB
ALBUMIN SERPL-MCNC: 3.6 G/DL (ref 3.4–5)
ALBUMIN UR-MCNC: 10 MG/DL
ALP SERPL-CCNC: 72 U/L (ref 40–150)
ALT SERPL W P-5'-P-CCNC: 28 U/L (ref 0–70)
AMORPH CRY #/AREA URNS HPF: ABNORMAL /HPF
ANION GAP SERPL CALCULATED.3IONS-SCNC: 1 MMOL/L (ref 3–14)
APPEARANCE UR: ABNORMAL
AST SERPL W P-5'-P-CCNC: 15 U/L (ref 0–45)
BASOPHILS # BLD AUTO: 0.1 10E9/L (ref 0–0.2)
BASOPHILS NFR BLD AUTO: 1.1 %
BILIRUB SERPL-MCNC: 0.6 MG/DL (ref 0.2–1.3)
BILIRUB UR QL STRIP: NEGATIVE
BUN SERPL-MCNC: 19 MG/DL (ref 7–30)
CALCIUM SERPL-MCNC: 9.3 MG/DL (ref 8.5–10.1)
CHLORIDE SERPL-SCNC: 109 MMOL/L (ref 94–109)
CO2 SERPL-SCNC: 31 MMOL/L (ref 20–32)
COLOR UR AUTO: YELLOW
CREAT SERPL-MCNC: 0.84 MG/DL (ref 0.66–1.25)
DIFFERENTIAL METHOD BLD: NORMAL
EOSINOPHIL # BLD AUTO: 0.2 10E9/L (ref 0–0.7)
EOSINOPHIL NFR BLD AUTO: 3.4 %
ERYTHROCYTE [DISTWIDTH] IN BLOOD BY AUTOMATED COUNT: 12.4 % (ref 10–15)
GFR SERPL CREATININE-BSD FRML MDRD: >90 ML/MIN/{1.73_M2}
GLUCOSE SERPL-MCNC: 154 MG/DL (ref 70–99)
GLUCOSE UR STRIP-MCNC: NEGATIVE MG/DL
HCT VFR BLD AUTO: 44 % (ref 40–53)
HGB BLD-MCNC: 15.1 G/DL (ref 13.3–17.7)
HGB UR QL STRIP: NEGATIVE
IMM GRANULOCYTES # BLD: 0 10E9/L (ref 0–0.4)
IMM GRANULOCYTES NFR BLD: 0.2 %
KETONES UR STRIP-MCNC: NEGATIVE MG/DL
LEUKOCYTE ESTERASE UR QL STRIP: NEGATIVE
LYMPHOCYTES # BLD AUTO: 2.1 10E9/L (ref 0.8–5.3)
LYMPHOCYTES NFR BLD AUTO: 38.1 %
MCH RBC QN AUTO: 29.9 PG (ref 26.5–33)
MCHC RBC AUTO-ENTMCNC: 34.3 G/DL (ref 31.5–36.5)
MCV RBC AUTO: 87 FL (ref 78–100)
MONOCYTES # BLD AUTO: 0.4 10E9/L (ref 0–1.3)
MONOCYTES NFR BLD AUTO: 8 %
MUCOUS THREADS #/AREA URNS LPF: PRESENT /LPF
NEUTROPHILS # BLD AUTO: 2.7 10E9/L (ref 1.6–8.3)
NEUTROPHILS NFR BLD AUTO: 49.2 %
NITRATE UR QL: NEGATIVE
PH UR STRIP: 5.5 PH (ref 5–7)
PLATELET # BLD AUTO: 192 10E9/L (ref 150–450)
POTASSIUM SERPL-SCNC: 3.9 MMOL/L (ref 3.4–5.3)
PROT SERPL-MCNC: 6.8 G/DL (ref 6.8–8.8)
PSA SERPL-ACNC: 2.84 UG/L (ref 0–4)
RBC # BLD AUTO: 5.05 10E12/L (ref 4.4–5.9)
RBC #/AREA URNS AUTO: ABNORMAL /HPF
SODIUM SERPL-SCNC: 141 MMOL/L (ref 133–144)
SOURCE: ABNORMAL
SP GR UR STRIP: 1.03 (ref 1–1.03)
UROBILINOGEN UR STRIP-MCNC: NORMAL MG/DL (ref 0–2)
WBC # BLD AUTO: 5.5 10E9/L (ref 4–11)
WBC #/AREA URNS AUTO: ABNORMAL /HPF

## 2019-11-22 PROCEDURE — 80053 COMPREHEN METABOLIC PANEL: CPT | Performed by: FAMILY MEDICINE

## 2019-11-22 PROCEDURE — 76870 US EXAM SCROTUM: CPT | Performed by: RADIOLOGY

## 2019-11-22 PROCEDURE — 81001 URINALYSIS AUTO W/SCOPE: CPT | Performed by: FAMILY MEDICINE

## 2019-11-22 PROCEDURE — 99213 OFFICE O/P EST LOW 20 MIN: CPT | Performed by: FAMILY MEDICINE

## 2019-11-22 PROCEDURE — G0103 PSA SCREENING: HCPCS | Performed by: FAMILY MEDICINE

## 2019-11-22 PROCEDURE — 85025 COMPLETE CBC W/AUTO DIFF WBC: CPT | Performed by: FAMILY MEDICINE

## 2019-11-22 PROCEDURE — 36415 COLL VENOUS BLD VENIPUNCTURE: CPT | Performed by: FAMILY MEDICINE

## 2019-11-22 RX ORDER — CLOTRIMAZOLE 1 %
CREAM (GRAM) TOPICAL 2 TIMES DAILY
Qty: 60 G | Refills: 0 | Status: SHIPPED | OUTPATIENT
Start: 2019-11-22 | End: 2022-08-26

## 2019-11-22 ASSESSMENT — MIFFLIN-ST. JEOR: SCORE: 1850.78

## 2019-11-22 ASSESSMENT — PAIN SCALES - GENERAL: PAINLEVEL: NO PAIN (0)

## 2019-11-22 NOTE — PATIENT INSTRUCTIONS
Patient Education     Testicular Pain, Unclear Cause  You have had pain in one or both testicles. Based on your exam today, the exact cause of your pain is not certain. But your condition does not appear to be dangerous. Testicles are very sensitive. Even a small injury can cause quite a bit of pain. Other possible causes of testicular pain include kidney stones, cysts, mumps, inflammatory conditions, chronic conditions, hernia, infection, and a twisted testicle.  Certain tests may be done to rule out an underlying problem causing the pain. Nothing conclusive was found today. Most likely, the pain will go away on its own. If it doesn t, you may need more tests.    Home care  Medicine may be prescribed to help relieve pain and swelling. This may be an over-the-counter pain reliever or prescription pain medication. Take all medicine as directed.  The following are general care guidelines:    To relieve pain and swelling, apply an ice pack wrapped in a thin towel for 10 minutes at a time. Continue this on and off for 1 to 2 days.    When lying down, place a small rolled towel under your scrotum. When moving around, wear a jockstrap (athletic supporter) or supportive underwear. These will help support and protect your testicles.    If it hurts to walk, walk as little as possible until you feel better.    Avoid strenuous activity until you feel better.    Do not have sex until you feel better.    If you have severe pain in the testicle, seek care right away. Delay may lead to permanent loss of the testicle s function.  Follow-up care  Follow up with your healthcare provider, or as advised.  When to seek medical advice  Call your healthcare provider right away if any of these occur:    Fever of 100.4 F (38 C) or higher    Worsening of the pain or severe pain    Swelling of the testicle or scrotum    A lump in the scrotum    Warm and red scrotum (signs of infection)    Nausea and vomiting    Pain or swelling in  abdomen    Trouble urinating    Numbness or weakness in the leg    Shrinking of the testicle    Blood in your urine  Date Last Reviewed: 10/1/2016    1876-7747 [employer]. [address] [place] [address], [place] [address]. All rights reserved. This information is not intended as a substitute for professional medical care. Always follow your healthcare professional's instructions.

## 2019-11-22 NOTE — PROGRESS NOTES
Subjective     Tl Sands is a 49 year old male who presents to clinic today for the following health issues:    HPI   Patient with concerns for a small streak of blood on Sunday, he denies dysuria, hematuria, testicular pain, fever or chills,  for more than 40 years and monogamous. Uses Cialis as needed weekly, denies trauma.      Patient Active Problem List   Diagnosis     Other psoriasis     Epidermoid cyst of skin     Type 1 diabetes mellitus (H)     DM w/o complication type I (H)     Hyperlipidemia LDL goal <100     Type 1 diabetes mellitus without complication (H)     Family history of colon cancer     Chronic bilateral low back pain without sciatica     Past Surgical History:   Procedure Laterality Date     ARTHROSCOPY KNEE RT/LT      Has had bilateral knee surgeries for medial meisca; tears     C ANESTH,BICEPS TENDON REPAIR Left      COLONOSCOPY WITH CO2 INSUFFLATION N/A 10/8/2018    Procedure: COLONOSCOPY WITH CO2 INSUFFLATION;  colon/family history & cancer screening/Dr Wolff/ bmi 31.73 /[phone];  Surgeon: Yoselin Quiñones MD;  Location: [facility] OR     COMBINED ESOPHAGOSCOPY, GASTROSCOPY, DUODENOSCOPY (EGD) WITH CO2 INSUFFLATION N/A 12/18/2018    Procedure: COMBINED ESOPHAGOSCOPY, GASTROSCOPY, DUODENOSCOPY (EGD) WITH CO2 INSUFFLATION;  Surgeon: Lemuel Silver MD;  Location: [facility] OR     ESOPHAGOSCOPY, GASTROSCOPY, DUODENOSCOPY (EGD), COMBINED N/A 12/18/2018    Procedure: Combined Esophagoscopy, Gastroscopy, Duodenoscopy (Egd), Biopsy Single Or Multiple;  Surgeon: Lemuel Silver MD;  Location: [facility] OR     HERNIA REPAIR, INGUINAL RT/LT  2000    Left     HERNIA REPAIR, INGUINAL RT/LT  2001    Right       Social History     Tobacco Use     Smoking status: Never Smoker     Smokeless tobacco: Never Used   Substance Use Topics     Alcohol use: Yes     Comment: occ     Family History   Problem Relation Age of Onset     Diabetes Maternal Grandfather         Type 1     Cerebrovascular  Disease Paternal Grandfather      Colon Cancer Paternal Uncle          Current Outpatient Medications   Medication Sig Dispense Refill     atorvastatin (LIPITOR) 10 MG tablet Take 1 tablet (10 mg) by mouth daily 90 tablet 3     clotrimazole (LOTRIMIN) 1 % external cream Apply topically 2 times daily 60 g 0     Continuous Blood Gluc  (DEXCOM G6 ) TYLOR 1 each continuous 1 Device 0     Continuous Blood Gluc Sensor (DEXCOM G6 SENSOR) MISC Inject 1 each Subcutaneous See Admin Instructions Change sensor every 10 days 9 each 3     Continuous Blood Gluc Transmit (DEXCOM G6 TRANSMITTER) MISC 1 each every 3 months 1 each 1     econazole nitrate 1 % cream Apply topically 2 times daily 60 g 11     glucose blood VI test strips strip Test 4-5 times daily 400 strip 3     ibuprofen (ADVIL/MOTRIN) 200 MG tablet Take 400-600 mg by mouth as needed       insulin glargine (LANTUS SOLOSTAR) 100 UNIT/ML pen Inject 26 Units Subcutaneous every morning 30 mL 3     insulin lispro (HUMALOG KWIKPEN) 100 UNIT/ML (1 unit dial) pen Uses up to 40 units daily for carb coverage, correction, and priming 15 mL 3     insulin lispro (HUMALOG KWIKPEN) 100 UNIT/ML pen INJECT 5-10 UNITS SUBCUTANEOUSLY BEFORE MEAL(S), total daily dose up to 30 U 30 mL 3     insulin pen needle (B-D U/F) 31G X 5 MM miscellaneous Use 4 time(s) per day.  Please dispense as BD Pen Needle Mini U/F 31G x 5  each 3     insulin pen needle (B-D U/F) 31G X 8 MM miscellaneous USE ONE  FIVE TIMES DAILY (TO  BE  USED  WITH  INSULIN  PEN,  3  MEALS  AND  AT  BEDTIME) 300 each 1     LANCETS REGULAR MISC 1 Device 4 times daily. 360 each 3     mometasone (ELOCON) 0.1 % solution (lotion) APPLY SOLUTION TOPICALLY TWICE DAILY 60 mL 3     omeprazole (PRILOSEC) 20 MG DR capsule Take 1 capsule (20 mg) by mouth daily 30 capsule 1     tadalafil (CIALIS) 20 MG tablet Take 1 tablet by mouth. As needed 20 tablet prn     No Known Allergies  Recent Labs   Lab Test 11/22/19  0838  "10/16/19  0914 08/28/19 12/10/18  1127 08/29/18  0815 08/29/18 07/26/17  0737  03/15/16  0755   A1C  --   --  8.0*  --   --  7.2 7.1*   < >  --    LDL  --  99  --   --  85  --  86  --  92   HDL  --  56  --   --  51  --  60  --  56   TRIG  --  56  --   --  49  --  59  --  82   ALT 28  --   --   --  22  --  26  --   --    CR 0.84  --   --  0.73 0.97  --  0.84  --  0.96   GFRESTIMATED >90  --   --  >90 83  --  >90  Non  GFR Calc    --  84   GFRESTBLACK >90  --   --  >90 >90  --  >90  African American GFR Calc    --  >90   GFR Calc     POTASSIUM 3.9  --   --  3.9 4.1  --  Unsatisfactory specimen - hemolyzed  --  3.9   TSH  --   --   --   --   --   --  1.37  --  1.45    < > = values in this interval not displayed.      BP Readings from Last 3 Encounters:   11/22/19 130/82   08/28/19 111/78   06/17/19 120/78    Wt Readings from Last 3 Encounters:   11/22/19 99.2 kg (218 lb 12.8 oz)   08/28/19 96.3 kg (212 lb 4.8 oz)   04/15/19 95.3 kg (210 lb)                    Reviewed and updated as needed this visit by Provider  Tobacco  Allergies  Meds  Problems  Med Hx  Surg Hx  Fam Hx         Review of Systems         Objective    /82   Pulse 86   Temp 97.8  F (36.6  C) (Oral)   Ht 1.765 m (5' 9.5\")   Wt 99.2 kg (218 lb 12.8 oz)   SpO2 98%   BMI 31.85 kg/m    Body mass index is 31.85 kg/m .  Physical Exam   GENERAL: healthy, alert and no distress  EYES: Eyes grossly normal to inspection, PERRL and conjunctivae and sclerae normal  HENT: ear canals and TM's normal, nose and mouth without ulcers or lesions  RESP: lungs clear to auscultation - no rales, rhonchi or wheezes  CV: regular rate and rhythm, normal S1 S2, no S3 or S4, no murmur, click or rub, no peripheral edema and peripheral pulses strong  RECTAL (male): prostate 1+ enlarged, nontender and Left testicular mass  MS: no gross musculoskeletal defects noted, no edema    Results for orders placed or performed in visit on " 11/22/19   PSA, screen     Status: None   Result Value Ref Range    PSA 2.84 0 - 4 ug/L   CBC with platelets and differential     Status: None   Result Value Ref Range    WBC 5.5 4.0 - 11.0 10e9/L    RBC Count 5.05 4.4 - 5.9 10e12/L    Hemoglobin 15.1 13.3 - 17.7 g/dL    Hematocrit 44.0 40.0 - 53.0 %    MCV 87 78 - 100 fl    MCH 29.9 26.5 - 33.0 pg    MCHC 34.3 31.5 - 36.5 g/dL    RDW 12.4 10.0 - 15.0 %    Platelet Count 192 150 - 450 10e9/L    Diff Method Automated Method     % Neutrophils 49.2 %    % Lymphocytes 38.1 %    % Monocytes 8.0 %    % Eosinophils 3.4 %    % Basophils 1.1 %    % Immature Granulocytes 0.2 %    Absolute Neutrophil 2.7 1.6 - 8.3 10e9/L    Absolute Lymphocytes 2.1 0.8 - 5.3 10e9/L    Absolute Monocytes 0.4 0.0 - 1.3 10e9/L    Absolute Eosinophils 0.2 0.0 - 0.7 10e9/L    Absolute Basophils 0.1 0.0 - 0.2 10e9/L    Abs Immature Granulocytes 0.0 0 - 0.4 10e9/L   Comprehensive metabolic panel     Status: Abnormal   Result Value Ref Range    Sodium 141 133 - 144 mmol/L    Potassium 3.9 3.4 - 5.3 mmol/L    Chloride 109 94 - 109 mmol/L    Carbon Dioxide 31 20 - 32 mmol/L    Anion Gap 1 (L) 3 - 14 mmol/L    Glucose 154 (H) 70 - 99 mg/dL    Urea Nitrogen 19 7 - 30 mg/dL    Creatinine 0.84 0.66 - 1.25 mg/dL    GFR Estimate >90 >60 mL/min/[1.73_m2]    GFR Estimate If Black >90 >60 mL/min/[1.73_m2]    Calcium 9.3 8.5 - 10.1 mg/dL    Bilirubin Total 0.6 0.2 - 1.3 mg/dL    Albumin 3.6 3.4 - 5.0 g/dL    Protein Total 6.8 6.8 - 8.8 g/dL    Alkaline Phosphatase 72 40 - 150 U/L    ALT 28 0 - 70 U/L    AST 15 0 - 45 U/L             Assessment & Plan     1. Hematospermia  Reassurance given for self limiting benign condition.  - UROLOGY ADULT REFERRAL    2. Left varicocele  - UROLOGY ADULT REFERRAL    3. Testicle lump  - CBC with platelets and differential  - Comprehensive metabolic panel  - clotrimazole (LOTRIMIN) 1 % external cream; Apply topically 2 times daily  Dispense: 60 g; Refill: 0  - US Testicular and  Scrotum; Future  - UA with Microscopic reflex to Culture ([facility])         Return if symptoms worsen or fail to improve.    Adam Burns MD  [facility]

## 2019-11-22 NOTE — TELEPHONE ENCOUNTER
[facility] Call Center    Phone Message    May a detailed message be left on voicemail: yes    Reason for Call: Medication Question or concern regarding medication   Prescription Clarification  Pharmacy called and is requesting call back for clarification on where to apply clotrimazole (LOTRIMIN) 1 % external cream. Please call to discuss          Action Taken: Message routed to:  Primary Care p [phone]

## 2019-12-03 ENCOUNTER — TRANSFERRED RECORDS (OUTPATIENT)
Dept: HEALTH INFORMATION MANAGEMENT | Facility: CLINIC | Age: 49
End: 2019-12-03

## 2019-12-10 ENCOUNTER — OFFICE VISIT (OUTPATIENT)
Dept: DERMATOLOGY | Facility: CLINIC | Age: 49
End: 2019-12-10
Payer: COMMERCIAL

## 2019-12-10 DIAGNOSIS — L57.8 SUN-DAMAGED SKIN: ICD-10-CM

## 2019-12-10 DIAGNOSIS — D18.01 CHERRY ANGIOMA: ICD-10-CM

## 2019-12-10 DIAGNOSIS — L21.9 DERMATITIS, SEBORRHEIC: ICD-10-CM

## 2019-12-10 DIAGNOSIS — Z85.828 HISTORY OF NONMELANOMA SKIN CANCER: Primary | ICD-10-CM

## 2019-12-10 DIAGNOSIS — L73.8 SENILE SEBACEOUS GLAND HYPERPLASIA: ICD-10-CM

## 2019-12-10 DIAGNOSIS — L82.1 SEBORRHEIC KERATOSIS: ICD-10-CM

## 2019-12-10 DIAGNOSIS — L91.8 SKIN TAG: ICD-10-CM

## 2019-12-10 DIAGNOSIS — L81.4 SOLAR LENTIGO: ICD-10-CM

## 2019-12-10 DIAGNOSIS — L21.9 SEBORRHEIC DERMATITIS: ICD-10-CM

## 2019-12-10 DIAGNOSIS — D22.9 MULTIPLE BENIGN NEVI: ICD-10-CM

## 2019-12-10 PROCEDURE — 99214 OFFICE O/P EST MOD 30 MIN: CPT | Performed by: DERMATOLOGY

## 2019-12-10 RX ORDER — MOMETASONE FUROATE 1 MG/ML
SOLUTION TOPICAL
Qty: 60 ML | Refills: 11 | Status: SHIPPED | OUTPATIENT
Start: 2019-12-10 | End: 2020-08-11

## 2019-12-10 RX ORDER — KETOCONAZOLE 20 MG/G
CREAM TOPICAL DAILY
Status: CANCELLED | OUTPATIENT
Start: 2019-12-10

## 2019-12-10 RX ORDER — KETOCONAZOLE 20 MG/ML
SHAMPOO TOPICAL DAILY PRN
Status: CANCELLED | OUTPATIENT
Start: 2019-12-10

## 2019-12-10 ASSESSMENT — PAIN SCALES - GENERAL: PAINLEVEL: NO PAIN (0)

## 2019-12-10 NOTE — PATIENT INSTRUCTIONS
For the feet:  Begin using over the counter Urea Cream 40% on the left ball foot. You can buy this online on [employer].     For the scalp:  Use over the counter anti-dandruff shampoo. Then follow with your usual conditioner. Continue to use the mometasone 0.1% solution in this area.
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
## 2019-12-10 NOTE — NURSING NOTE
Tl Sands's goals for this visit include:   Chief Complaint   Patient presents with     Derm Problem     Tl is returning to discuss area on the back     He requests these members of his care team be copied on today's visit information:     PCP: Cathryn Leung    Referring Provider:  No referring provider defined for this encounter.    There were no vitals taken for this visit.    Do you need any medication refills at today's visit? No    Vi Dougherty LPN
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
## 2019-12-26 ENCOUNTER — OFFICE VISIT (OUTPATIENT)
Dept: UROLOGY | Facility: CLINIC | Age: 49
End: 2019-12-26
Attending: FAMILY MEDICINE
Payer: COMMERCIAL

## 2019-12-26 VITALS
BODY MASS INDEX: 25.41 KG/M2 | DIASTOLIC BLOOD PRESSURE: 70 MMHG | OXYGEN SATURATION: 98 % | HEIGHT: 78 IN | HEART RATE: 66 BPM | WEIGHT: 219.6 LBS | SYSTOLIC BLOOD PRESSURE: 106 MMHG

## 2019-12-26 DIAGNOSIS — I86.1 LEFT VARICOCELE: ICD-10-CM

## 2019-12-26 DIAGNOSIS — R36.1 HEMATOSPERMIA: Primary | ICD-10-CM

## 2019-12-26 LAB
ALBUMIN UR-MCNC: 30 MG/DL
APPEARANCE UR: CLEAR
BACTERIA #/AREA URNS HPF: ABNORMAL /HPF
BILIRUB UR QL STRIP: NEGATIVE
COLOR UR AUTO: YELLOW
GLUCOSE UR STRIP-MCNC: 300 MG/DL
HGB UR QL STRIP: NEGATIVE
KETONES UR STRIP-MCNC: NEGATIVE MG/DL
LEUKOCYTE ESTERASE UR QL STRIP: NEGATIVE
MUCOUS THREADS #/AREA URNS LPF: PRESENT /LPF
NITRATE UR QL: NEGATIVE
PH UR STRIP: 5.5 PH (ref 5–7)
RBC #/AREA URNS AUTO: ABNORMAL /HPF
SOURCE: ABNORMAL
SP GR UR STRIP: >1.035 (ref 1–1.03)
UROBILINOGEN UR STRIP-MCNC: NORMAL MG/DL (ref 0–2)
WBC #/AREA URNS AUTO: ABNORMAL /HPF

## 2019-12-26 PROCEDURE — 99203 OFFICE O/P NEW LOW 30 MIN: CPT | Performed by: UROLOGY

## 2019-12-26 PROCEDURE — 81001 URINALYSIS AUTO W/SCOPE: CPT | Performed by: UROLOGY

## 2019-12-26 ASSESSMENT — MIFFLIN-ST. JEOR: SCORE: 2005.23

## 2019-12-26 ASSESSMENT — PAIN SCALES - GENERAL: PAINLEVEL: NO PAIN (0)

## 2019-12-26 NOTE — Clinical Note
Wale Burns,I saw Tl today in consultation for his episode of hematospermia and his incidental left varicocele. We discussed that neither of these are concerning at this time and no additional work up is needed. I would recommend annual PSA monitoring which he may have done with an annual physical.Thanks, Jimmy Rdz M.D. Cell: [phone]

## 2019-12-26 NOTE — NURSING NOTE
"Tl Sands's goals for this visit include:   Chief Complaint   Patient presents with     New Patient     hematospermia       He requests these members of his care team be copied on today's visit information: Yes    PCP: Cathryn Leung    Referring Provider:  Adam Burns MD  [id number] [place] [address]    /70 (BP Location: Left arm, Patient Position: Sitting, Cuff Size: Adult Large)   Pulse 66   Ht 2.007 m (6' 7\")   Wt 99.6 kg (219 lb 9.6 oz)   SpO2 98%   BMI 24.74 kg/m      Do you need any medication refills at today's visit? No    Maribel Abraham LPN      "
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
## 2020-01-09 ENCOUNTER — OFFICE VISIT (OUTPATIENT)
Dept: ORTHOPEDICS | Facility: CLINIC | Age: 50
End: 2020-01-09
Payer: COMMERCIAL

## 2020-01-09 VITALS
BODY MASS INDEX: 31.35 KG/M2 | DIASTOLIC BLOOD PRESSURE: 71 MMHG | HEIGHT: 70 IN | WEIGHT: 219 LBS | SYSTOLIC BLOOD PRESSURE: 107 MMHG

## 2020-01-09 DIAGNOSIS — M47.816 LUMBAR FACET ARTHROPATHY: ICD-10-CM

## 2020-01-09 DIAGNOSIS — M47.819 ARTHRITIS OF LOW BACK: ICD-10-CM

## 2020-01-09 DIAGNOSIS — M54.16 LUMBAR RADICULOPATHY: Primary | ICD-10-CM

## 2020-01-09 PROCEDURE — 99214 OFFICE O/P EST MOD 30 MIN: CPT | Performed by: FAMILY MEDICINE

## 2020-01-09 ASSESSMENT — MIFFLIN-ST. JEOR: SCORE: 1851.69

## 2020-01-09 NOTE — PROGRESS NOTES
"HISTORY OF PRESENT ILLNESS  Mr. Sands is a 49 year old male following up with back and leg pain.  Kaz last saw me just under a year ago, at that visit we followed up after he had a L3 4 injection that completely took care of his pain.  Unfortunately his pain had returned, I ordered a MRI remotely and subsequently referred him for an injection.  He had a repeat right-sided L3 4 injection done in December at [facility].  This took care of his leg pain, unfortunately he still has low back pain.  He points to the area throughout his low back, his pain is in a bandlike distribution, right side worse than left.  He is taking Tylenol, ibuprofen, and Kalyn back and body.  He finds that Tylenol is most effective.  He is having issues sleeping at night.     PHYSICAL EXAM  Vitals:    01/09/20 0907   BP: 107/71   Weight: 99.3 kg (219 lb)   Height: 1.765 m (5' 9.5\")     General  - normal appearance, in no obvious distress  CV  - normal peripheral perfusion  Pulm  - normal respiratory pattern, non-labored  Musculoskeletal - lumbar spine  - stance: normal gait without limp  - inspection: normal bone and joint alignment, no obvious scoliosis  - palpation: mildly tender lumbar paraspinals  - ROM: extension exacerbates pain, normal flexion , sidebending, rotation  - strength: lower extremities 5/5 in all planes  - special tests:  (-) slump test bilaterally  Neuro  - patellar and Achilles DTRs 2+ bilaterally, grossly normal coordination, normal muscle tone  Skin  - no ecchymosis, erythema, warmth, or induration, no obvious rash  Psych  - interactive, appropriate, normal mood and affect          ASSESSMENT & PLAN  Mr. Sands is a 49 year old male following up with back and leg pain.    I reviewed his most recent MRI from [facility] this does show multilevel lumbar spondylosis with a L3-4 disc herniation causing right-sided stenosis.    It does seem that Kaz is most symptomatic from his lumbar spondylosis, we did discuss this in some " detail.  I am referring him for injections in his facet joints, he can have this done at his earliest convenience.  I am also referring him to Cynthia Mcgarry at [facility] to work on spine strengthening.    If he does not find relief with these measures I would likely refer him to our surgical partners.    It was a pleasure seeing Kaz.        Thai Mims DO, [facility]      This note was constructed using Dragon dictation software, please excuse any minor errors in spelling, grammar, or syntax.

## 2020-01-09 NOTE — PROGRESS NOTES
[facility] FOLLOW-UP VISIT 1/9/2020    Tl Sands's chief complaint for this visit includes:  Chief Complaint   Patient presents with     RECHECK     l/u low back, has an JESUS December, had a few weeks of relief.      PCP: Cathryn Leung    Referring Provider:  No referring provider defined for this encounter.    There were no vitals taken for this visit.  Data Unavailable       Interval History:     Follow up reason: low back     Following Therapeutic Plan: Yes     Pain: Unchanged    Function: Unchanged    Medical History:    Any recent changes to your medical history? No    Any new medication prescribed since last visit? No    Review of Systems:    Do you have fever, chills, weight loss? No    Do you have any vision problems? No    Do you have any chest pain or edema? No    Do you have any shortness of breath or wheezing?  No    Do you have stomach problems? [id number]}    Do you have any numbness or focal weakness    Do you have diabetes? Yes    Do you have problems with bleeding or clotting? No    Do you have an rashes or other skin lesions? No

## 2020-01-09 NOTE — LETTER
"    1/9/2020         RE: Tl Sands  [address]  [place] [address]        Dear Colleague,    Thank you for referring your patient, Tl Sands, to the [facility]. Please see a copy of my visit note below.    HISTORY OF PRESENT ILLNESS  Mr. Sands is a 49 year old male following up with back and leg pain.  Kaz last saw me just under a year ago, at that visit we followed up after he had a L3 4 injection that completely took care of his pain.  Unfortunately his pain had returned, I ordered a MRI remotely and subsequently referred him for an injection.  He had a repeat right-sided L3 4 injection done in December at [facility].  This took care of his leg pain, unfortunately he still has low back pain.  He points to the area throughout his low back, his pain is in a bandlike distribution, right side worse than left.  He is taking Tylenol, ibuprofen, and Kalyn back and body.  He finds that Tylenol is most effective.  He is having issues sleeping at night.     PHYSICAL EXAM  Vitals:    01/09/20 0907   BP: 107/71   Weight: 99.3 kg (219 lb)   Height: 1.765 m (5' 9.5\")     General  - normal appearance, in no obvious distress  CV  - normal peripheral perfusion  Pulm  - normal respiratory pattern, non-labored  Musculoskeletal - lumbar spine  - stance: normal gait without limp  - inspection: normal bone and joint alignment, no obvious scoliosis  - palpation: mildly tender lumbar paraspinals  - ROM: extension exacerbates pain, normal flexion , sidebending, rotation  - strength: lower extremities 5/5 in all planes  - special tests:  (-) slump test bilaterally  Neuro  - patellar and Achilles DTRs 2+ bilaterally, grossly normal coordination, normal muscle tone  Skin  - no ecchymosis, erythema, warmth, or induration, no obvious rash  Psych  - interactive, appropriate, normal mood and affect          ASSESSMENT & PLAN  Mr. Sands is a 49 year old male following up with back and leg pain.    I " reviewed his most recent MRI from [facility] this does show multilevel lumbar spondylosis with a L3-4 disc herniation causing right-sided stenosis.    It does seem that Kaz is most symptomatic from his lumbar spondylosis, we did discuss this in some detail.  I am referring him for injections in his facet joints, he can have this done at his earliest convenience.  I am also referring him to Cynthia Mcgarry at [facility] to work on spine strengthening.    If he does not find relief with these measures I would likely refer him to our surgical partners.    It was a pleasure seeing Kaz.        Thai Mims DO, [facility]      This note was constructed using Dragon dictation software, please excuse any minor errors in spelling, grammar, or syntax.            [facility] FOLLOW-UP VISIT 1/9/2020    Tl ROMERO Sands's chief complaint for this visit includes:  Chief Complaint   Patient presents with     RECHECK     l/u low back, has an JESUS December, had a few weeks of relief.      PCP: Cathryn Leung    Referring Provider:  No referring provider defined for this encounter.    There were no vitals taken for this visit.  Data Unavailable       Interval History:     Follow up reason: low back     Following Therapeutic Plan: Yes     Pain: Unchanged    Function: Unchanged    Medical History:    Any recent changes to your medical history? No    Any new medication prescribed since last visit? No    Review of Systems:    Do you have fever, chills, weight loss? No    Do you have any vision problems? No    Do you have any chest pain or edema? No    Do you have any shortness of breath or wheezing?  No    Do you have stomach problems? [id number]}    Do you have any numbness or focal weakness    Do you have diabetes? Yes    Do you have problems with bleeding or clotting? No    Do you have an rashes or other skin lesions? No      Again, thank you for allowing me to participate in the care of your patient.         Sincerely,        Tahi Mims, DO

## 2020-02-06 ENCOUNTER — ANCILLARY PROCEDURE (OUTPATIENT)
Dept: GENERAL RADIOLOGY | Facility: CLINIC | Age: 50
End: 2020-02-06
Attending: FAMILY MEDICINE
Payer: COMMERCIAL

## 2020-02-06 VITALS — HEART RATE: 76 BPM | DIASTOLIC BLOOD PRESSURE: 88 MMHG | SYSTOLIC BLOOD PRESSURE: 131 MMHG | OXYGEN SATURATION: 99 %

## 2020-02-06 DIAGNOSIS — M47.819 ARTHRITIS OF LOW BACK: ICD-10-CM

## 2020-02-06 DIAGNOSIS — M47.816 LUMBAR FACET ARTHROPATHY: ICD-10-CM

## 2020-02-06 DIAGNOSIS — M54.16 LUMBAR RADICULOPATHY: ICD-10-CM

## 2020-02-06 PROCEDURE — 64493 INJ PARAVERT F JNT L/S 1 LEV: CPT | Mod: 50 | Performed by: RADIOLOGY

## 2020-02-06 RX ORDER — BUPIVACAINE HYDROCHLORIDE 2.5 MG/ML
5 INJECTION, SOLUTION INFILTRATION; PERINEURAL ONCE
Status: COMPLETED | OUTPATIENT
Start: 2020-02-06 | End: 2020-02-06

## 2020-02-06 RX ORDER — METHYLPREDNISOLONE ACETATE 80 MG/ML
80 INJECTION, SUSPENSION INTRA-ARTICULAR; INTRALESIONAL; INTRAMUSCULAR; SOFT TISSUE ONCE
Status: DISPENSED | OUTPATIENT
Start: 2020-02-06

## 2020-02-06 RX ORDER — BUPIVACAINE HYDROCHLORIDE 5 MG/ML
2 INJECTION, SOLUTION EPIDURAL; INTRACAUDAL ONCE
Status: DISPENSED | OUTPATIENT
Start: 2020-02-06

## 2020-02-06 RX ORDER — IOPAMIDOL 408 MG/ML
10 INJECTION, SOLUTION INTRATHECAL ONCE
Status: COMPLETED | OUTPATIENT
Start: 2020-02-06 | End: 2020-02-06

## 2020-02-06 RX ORDER — METHYLPREDNISOLONE ACETATE 40 MG/ML
40 INJECTION, SUSPENSION INTRA-ARTICULAR; INTRALESIONAL; INTRAMUSCULAR; SOFT TISSUE ONCE
Status: COMPLETED | OUTPATIENT
Start: 2020-02-06 | End: 2020-02-06

## 2020-02-06 RX ADMIN — METHYLPREDNISOLONE ACETATE 40 MG: 40 INJECTION, SUSPENSION INTRA-ARTICULAR; INTRALESIONAL; INTRAMUSCULAR; SOFT TISSUE at 09:07

## 2020-02-06 RX ADMIN — BUPIVACAINE HYDROCHLORIDE 25 MG: 2.5 INJECTION, SOLUTION INFILTRATION; PERINEURAL at 09:06

## 2020-02-06 RX ADMIN — IOPAMIDOL 4 ML: 408 INJECTION, SOLUTION INTRATHECAL at 09:06

## 2020-02-06 NOTE — PROGRESS NOTES
: Tl was seen in X-ray today for a lumbar epidural injection. Patient rated pain before procedure 3/10. After procedure patient rated pain 0/10. This pain level is acceptable to patient. Patient discharged home with .

## 2020-02-06 NOTE — PROGRESS NOTES
AFTER YOU GO HOME    ? DO relax; minimize your activity for 24 hours  ? You may resume normal activity tomorrow  ? You may remove the bandage in the evening or next morning  ? You may resume bathing the next day  ? Drink at least 4 extra glasses of fluid today if not on fluid restrictions  ? DO NOT drive or operate machinery at home or at work for at least 24 hours      VISIT THE EMERGENCY ROOM OR URGENT CARE IF:    ? There is redness or swelling at the injection site  ? There is discharge from the injection site  ? You develop a temperature of 101  F or greater      ADDITIONAL INSTRUCTIONS:    ? You may resume your Coumadin or other blood thinner at your regular dose today.  Follow up with your physician to have your INR rechecked if indicated.  ? If you gain no relief from the injection after two (2) weeks, follow-up with your provider for your options.        Contacts:    During business hours from 8 to 5 pm, you may call [phone] to reach a nurse advisor at [facility].  After hours, call [facility]  [phone].  Ask for the Radiologist on-call.  Someone is on-call 24 hrs/day.  [facility] Toll Free Number   [phone]

## 2020-02-23 ENCOUNTER — HEALTH MAINTENANCE LETTER (OUTPATIENT)
Age: 50
End: 2020-02-23

## 2020-02-27 DIAGNOSIS — E10.9 TYPE 1 DIABETES MELLITUS (H): Primary | ICD-10-CM

## 2020-02-27 DIAGNOSIS — E10.9 DM W/O COMPLICATION TYPE I (H): ICD-10-CM

## 2020-02-27 NOTE — TELEPHONE ENCOUNTER
I spoke with Tl and was able to schedule his follow up appointment with Dr Resendiz for March 31 at 240 pm.     Ben Leonard  Procedure , [place]  Peds Specialty and Adult Endocrinology

## 2020-02-27 NOTE — TELEPHONE ENCOUNTER
Insulin supplies- refilled per clinic  Continuous Blood Gluc  (DEXCOM G6 ) TYLOR   Continuous Blood Gluc Sensor (DEXCOM G6 SENSOR) MISC   Continuous Blood Gluc Transmit (DEXCOM G6 TRANSMITTER) MISC    Last Clinic Visit: 8/28/19 with recommended 4 month follow up  No upcoming visits scheduled

## 2020-02-28 RX ORDER — PROCHLORPERAZINE 25 MG/1
1 SUPPOSITORY RECTAL CONTINUOUS
Qty: 1 DEVICE | Refills: 0 | Status: SHIPPED | OUTPATIENT
Start: 2020-02-28 | End: 2020-07-29

## 2020-02-28 RX ORDER — PROCHLORPERAZINE 25 MG/1
1 SUPPOSITORY RECTAL
Qty: 1 EACH | Refills: 1 | Status: SHIPPED | OUTPATIENT
Start: 2020-02-28 | End: 2020-07-29

## 2020-02-28 RX ORDER — PROCHLORPERAZINE 25 MG/1
1 SUPPOSITORY RECTAL SEE ADMIN INSTRUCTIONS
Qty: 9 EACH | Refills: 3 | Status: SHIPPED | OUTPATIENT
Start: 2020-02-28 | End: 2020-03-04

## 2020-03-03 ENCOUNTER — TELEPHONE (OUTPATIENT)
Dept: ENDOCRINOLOGY | Facility: CLINIC | Age: 50
End: 2020-03-03

## 2020-03-03 DIAGNOSIS — E10.9 DM W/O COMPLICATION TYPE I (H): ICD-10-CM

## 2020-03-03 NOTE — TELEPHONE ENCOUNTER
Notification received from [employer] for Prior Authorization request for Dexcom G6 Sensor.    PA completed via [employer].    Key: [id number] - PA Case ID: [id number] - Rx #: [id number]    Will await determination.    Anju Barrios LPN  Diabetes Clinic Coordinator   Adult Endocrinology and Pediatric Specialty Clinics  [facility]

## 2020-03-04 RX ORDER — PROCHLORPERAZINE 25 MG/1
1 SUPPOSITORY RECTAL SEE ADMIN INSTRUCTIONS
Qty: 9 EACH | Refills: 3 | Status: SHIPPED | OUTPATIENT
Start: 2020-03-04 | End: 2020-07-29

## 2020-03-13 DIAGNOSIS — E10.9 DM W/O COMPLICATION TYPE I (H): ICD-10-CM

## 2020-03-14 NOTE — TELEPHONE ENCOUNTER
HumaLOG KwikPen 100 UNIT/ML Subcutaneous Solution Pen-injector       Last Written Prescription Date:  10-16-19  Last Fill Quantity: 15 ml,   # refills: 3  Last Office Visit : 8-28-19  Future Office visit:  3-31-20    Routing refill request to provider for review/approval because:  Insulin - refilled per clinic

## 2020-03-16 RX ORDER — INSULIN LISPRO 100 [IU]/ML
INJECTION, SOLUTION INTRAVENOUS; SUBCUTANEOUS
Qty: 15 ML | Refills: 0 | Status: SHIPPED | OUTPATIENT
Start: 2020-03-16 | End: 2020-03-31

## 2020-03-16 NOTE — TELEPHONE ENCOUNTER
Will forward to THANIA Ramos to review.    Anju Barrios LPN  Diabetes Clinic Coordinator   Adult Endocrinology and Pediatric Specialty Clinics  [facility]

## 2020-03-31 ENCOUNTER — VIRTUAL VISIT (OUTPATIENT)
Dept: ENDOCRINOLOGY | Facility: CLINIC | Age: 50
End: 2020-03-31
Payer: COMMERCIAL

## 2020-03-31 DIAGNOSIS — R05.8 DRY COUGH: ICD-10-CM

## 2020-03-31 DIAGNOSIS — E78.00 HYPERCHOLESTEREMIA: ICD-10-CM

## 2020-03-31 DIAGNOSIS — E10.9 TYPE I DIABETES MELLITUS, WELL CONTROLLED (H): Primary | ICD-10-CM

## 2020-03-31 PROCEDURE — 99213 OFFICE O/P EST LOW 20 MIN: CPT | Mod: TEL | Performed by: INTERNAL MEDICINE

## 2020-03-31 PROCEDURE — 95251 CONT GLUC MNTR ANALYSIS I&R: CPT | Performed by: INTERNAL MEDICINE

## 2020-03-31 RX ORDER — INSULIN LISPRO 100 [IU]/ML
INJECTION, SOLUTION INTRAVENOUS; SUBCUTANEOUS
Qty: 36 ML | Refills: 3 | Status: SHIPPED | OUTPATIENT
Start: 2020-03-31 | End: 2021-05-17

## 2020-03-31 RX ORDER — PEN NEEDLE, DIABETIC 32 GX 1/4"
1 NEEDLE, DISPOSABLE MISCELLANEOUS 4 TIMES DAILY
COMMUNITY
Start: 2020-03-02 | End: 2020-07-29
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
## 2020-06-03 ENCOUNTER — TELEPHONE (OUTPATIENT)
Dept: ENDOCRINOLOGY | Facility: CLINIC | Age: 50
End: 2020-06-03

## 2020-06-03 NOTE — TELEPHONE ENCOUNTER
1st Attempt: Recall letter mailed to Tl requesting a call back to schedule his 6 month follow up appointment with Dr Resendiz. Patient is due to be seen in September.    Ben Leonard  Procedure , [place]  [facility]

## 2020-06-11 ENCOUNTER — VIRTUAL VISIT (OUTPATIENT)
Dept: DERMATOLOGY | Facility: CLINIC | Age: 50
End: 2020-06-11
Payer: COMMERCIAL

## 2020-06-11 DIAGNOSIS — L91.0 HYPERTROPHIC SCAR: Primary | ICD-10-CM

## 2020-06-11 PROCEDURE — 99213 OFFICE O/P EST LOW 20 MIN: CPT | Mod: TEL | Performed by: DERMATOLOGY

## 2020-06-11 RX ORDER — CLOBETASOL PROPIONATE 0.5 MG/G
CREAM TOPICAL
Qty: 45 G | Refills: 0 | Status: SHIPPED | OUTPATIENT
Start: 2020-06-11

## 2020-06-11 ASSESSMENT — PAIN SCALES - GENERAL: PAINLEVEL: NO PAIN (0)

## 2020-06-11 NOTE — NURSING NOTE
"Teledermatology Nurse Call for RETURN patients seen within the last 3 years:    The patient was contacted by phone and we reviewed, \"Due to the coronavirus pandemic, we are calling to review your visit and offer you a teledermatology visit where you send in photos via [employer]. These photos will be seen by an MD or RUTH. This will be billed to you and your insurance.\"  The patient was also told that \"a teledermatology visit is not as thorough as an in-person visit and that the quality of the photograph sent may not be of the same quality as that taken by the dermatology clinic, but the patient would like to proceed with an teledermatology because of National Emergency Regarding Coronavirus disease (COVID 19) Outbreak.\"  \"If a prescription is necessary we can send it directly to your pharmacy.  If lab work is needed we can place an order for that and you can then stop by our lab to have the test done at a later time.\"    The patient understood that they may receive a call from the clinic to review additional history, may still be instructed to come to clinic even after photo review and be billed for both visits with an MD. They were told that a photo assessment does not replace an in person skin exam. The patient understood that teledermatology is not for urgent issues and would require up to 3 business days for review. The patient denied skin pain, fever, mucosal symptoms (lesions, blisters, sores in the mouth, nose, eyes, or genitals)  IF PATIENT ENDORSES ANY OF THESE STOP AND PAGE  ON CALL ATTENDING. IF OTHER POSSIBLY URGENT SYMPTOMS THEN PAGE PHYSICIAN YOU ARE SCHEDULING WITH OR ON CALL IF NO ANSWER.       The patient chose to:                                                                                                                                                                                                                    Consent to a teledermatology visit with [employer] photos. The patient understood " they must upload a [employer] photo for this visit to be completed. They indicated that the photo will be taken at their home address(if other address please document here). Patient told nursing these are already uploaded .  The patient was instructed to take photos of all all areas of concern and all areas of any rash from near and far away.                                                                                                                                                                                                                               The patient is verified to be in the [facility] at their home address at the time of the encounter.     The physician must be notified by nurse if the patient is not in the state at the time of the encounter DELETE THIS PHRASE FROM NOTE AFTER COMPLETING IF NEEDED                                                                                                                                                                                                            Patient concerns for this return visit:   Chief Complaint   Patient presents with     Derm Problem     Basal cell carcinoma, micronodular - itchy scar right upper back f/u..      Any improvement: No   Current Tx: None   Characteristics/Symptoms: Itching and when stretching the area a stinging pain. Raised and Ugly scar. Patient reported that the excision site did get infected 3-4 weeks after procedure.        Photo instructions reviewed with patients as below:  -ALL patient needs to send photos unless they have a phone only visit approved by the clinic:  o To send photos to your doctor, respond to the message in [employer] as many times as needed to upload your photos. Each message allows for 3 photos.  o For spots or lesions of concern, please take at least 2 photos of each site you are worried about (at different angles if needed, and at least one close up and one farther away so we can tell where  it is on the body. Be sure all photographs are in focus)  o For rashes, take photos of the entire body because it is important for us to see which areas are involved and which areas are not involved.  At a minimum, please include photos of the arms, legs, front of trunk, back of trunk, face, and a few close-ups of the rash.  Leave undergarments in place unless the rash involves the skin in these areas  o For acne, please take photos of the face, upper chest and neck, and upper chest and back  o For hair loss, please send views of the top of the head, sides of the head, back of the head and a picture of your face with your hair pulled back. Also, a photos of both hands with nails.    -For ADULT NEW patients video visits are needed, to receive an invitation and connect with your provider, the [employer] video visit technology must be accessible from your smartphone or personal device. Please click the link below for setup instructions: [email or web address]    Nursing tasks completed  -Pharmacy preference was updated.  -The nurse has dropped in the AVS information *(For adults the phrase is umdermhteleavs and for pediatrics it is their own) for the physician to route in the AVS.                                                                                                                                                                                                                         -The patient was told to contact the clinic if they have not received correspondence within 72 hours.
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
## 2020-06-11 NOTE — Clinical Note
Melanie for me or jarrett to do intralesional steroid injection within 4 weeks for hypertrophic scar
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
## 2020-06-11 NOTE — PATIENT INSTRUCTIONS
[facility] Teledermatology Visit    Thank you for allowing us to participate in your care. Your findings, instructions and follow-up plan are as follows:  Hypertrophic scar  -clobetasol twice daily for 1 week, take 1 week off then repeat  For scalp  - Refilled mometasone 0.1% solution to use once weekly  - Head and shoulders alternating with selsun stanley  When should I call my doctor?    If you are worsening or not improving, please, contact us or seek urgent care as noted below.     Who should I call with questions (adults)?    [facility] (adult and pediatric): [phone]     [facility] (adult): [phone]    For urgent needs outside of business hours call the [facility] at [phone] and ask for the dermatology resident on call    If this is a medical emergency and you are unable to reach an ER, Call 911      Who should I call with questions (pediatric)?  [facility]- Pediatric Dermatology  Dr. Maida Salazar, Dr. Marissa Cast, Dr. Anju Moore, [facility]  Dr. Stacie Torres, Dr. Xiomara Calderon & Dr. Thai Nicholas  Non Urgent  Nurse Triage Line; [phone]- Lianet and Jessica HUTCHINSON Care Coordinators   Aditi (/Complex ) [phone]    If you need a prescription refill, please contact your pharmacy. Refills are approved or denied by our Physicians during normal business hours, Monday through Fridays  Per office policy, refills will not be granted if you have not been seen within the past year (or sooner depending on your child's condition)    Scheduling Information:  Pediatric Appointment Scheduling and Call Center [phone]  Radiology Scheduling- [phone]  Sedation Unit Scheduling- [phone]  [place] Scheduling- General [phone]; Pediatric Dermatology [phone]  Main  Services: [phone]  [place]: [phone]  [place]:  [phone]  [place]: [phone]  Preadmission Nursing Department Fax Number: [phone] (Fax all pre-operative paperwork to this number)    For urgent matters arising during evenings, weekends, or holidays that cannot wait for normal business hours please call [phone] and ask for the Dermatology Resident On-Call to be paged.

## 2020-07-29 DIAGNOSIS — Z11.59 ENCOUNTER FOR SCREENING FOR OTHER VIRAL DISEASES: Primary | ICD-10-CM

## 2020-08-03 DIAGNOSIS — Z11.59 ENCOUNTER FOR SCREENING FOR OTHER VIRAL DISEASES: ICD-10-CM

## 2020-08-03 PROCEDURE — U0003 INFECTIOUS AGENT DETECTION BY NUCLEIC ACID (DNA OR RNA); SEVERE ACUTE RESPIRATORY SYNDROME CORONAVIRUS 2 (SARS-COV-2) (CORONAVIRUS DISEASE [COVID-19]), AMPLIFIED PROBE TECHNIQUE, MAKING USE OF HIGH THROUGHPUT TECHNOLOGIES AS DESCRIBED BY CMS-2020-01-R: HCPCS | Performed by: FAMILY MEDICINE

## 2020-08-03 RX ORDER — NICOTINE POLACRILEX 4 MG
15-30 LOZENGE BUCCAL
Status: CANCELLED | OUTPATIENT
Start: 2020-08-03

## 2020-08-03 RX ORDER — DEXTROSE MONOHYDRATE 25 G/50ML
25-50 INJECTION, SOLUTION INTRAVENOUS
Status: CANCELLED | OUTPATIENT
Start: 2020-08-03

## 2020-08-04 LAB
LABORATORY COMMENT REPORT: NORMAL
SARS-COV-2 RNA SPEC QL NAA+PROBE: NEGATIVE
SARS-COV-2 RNA SPEC QL NAA+PROBE: NORMAL
SPECIMEN SOURCE: NORMAL
SPECIMEN SOURCE: NORMAL

## 2020-08-04 NOTE — TELEPHONE ENCOUNTER
NOSIS: Low Back/Lumbar radiculopathy - Imaging January 2020 [facility]  Surgery:none per patient    APPOINTMENT DATE: 8.19.20   NOTES STATUS DETAILS   OFFICE NOTE from referring provider N/A    OFFICE NOTE from other specialist Internal 1.9.20 JANET Mims [facility]  10.18.18 Prasanna   DISCHARGE SUMMARY from hospital N/A    DISCHARGE REPORT from the ER N/A    OPERATIVE REPORT N/A    MEDICATION LIST Internal    EMG (for Spine) N/A    IMPLANT RECORD/STICKER N/A    LABS     CBC/DIFF Internal    CULTURES N/A    INJECTIONS DONE IN RADIOLOGY Internal 2.6.20 4.1.19 11.13.18   MRI N/A    CT SCAN N/A    XRAYS (IMAGES & REPORTS) N/A    TUMOR     PATHOLOGY  Slides & report N/A      Action  8.4.20   Action Taken Requested January 2020 image from [facility]     08/20/20   1:53 PM   Called KATHY and KISHOREM for images  An Robles CMA    08/20/20   2:26 PM   Spoke to Canelo, he stated MRI from [facility] is fine  complete  An Robles CMA

## 2020-08-05 ENCOUNTER — TELEPHONE (OUTPATIENT)
Dept: MEDSURG UNIT | Facility: CLINIC | Age: 50
End: 2020-08-05

## 2020-08-05 NOTE — TELEPHONE ENCOUNTER
Pre-Procedure Negative COVID Test 8/3/2020    Step 1 Patient Notification  Patient notified of the negative COVID test result    Step 2 Patient Information  Verified the patient remains symptom free   Patient informed to contact the ordering provider if any of the symptoms develop prior to the procedure    Fever/Chills   Cough   Shortness of breath   New loss of taste or smell  Sore throat  Muscle or body aches  Headaches  Fatigue  Vomiting or diarrhea    Step 3 Review Visitor Policy  Patient informed of the updated visitor policy   1 visitor allowed per patient   Visitor must screen negative for COVID symptoms   Visitor must wear a mask  Waiting rooms continue to be closed to visitors    Krishna Mortensen RN

## 2020-08-06 ENCOUNTER — HOSPITAL ENCOUNTER (OUTPATIENT)
Dept: GENERAL RADIOLOGY | Facility: CLINIC | Age: 50
End: 2020-08-06
Attending: FAMILY MEDICINE | Admitting: PLASTIC SURGERY
Payer: COMMERCIAL

## 2020-08-06 ENCOUNTER — HOSPITAL ENCOUNTER (OUTPATIENT)
Facility: CLINIC | Age: 50
Discharge: HOME | End: 2020-08-06
Attending: PLASTIC SURGERY | Admitting: PHYSICIAN ASSISTANT
Payer: COMMERCIAL

## 2020-08-06 VITALS
HEART RATE: 78 BPM | TEMPERATURE: 96.1 F | RESPIRATION RATE: 18 BRPM | SYSTOLIC BLOOD PRESSURE: 137 MMHG | DIASTOLIC BLOOD PRESSURE: 84 MMHG | OXYGEN SATURATION: 99 %

## 2020-08-06 DIAGNOSIS — M54.16 LUMBAR RADICULOPATHY: ICD-10-CM

## 2020-08-06 PROCEDURE — 25500064 ZZH RX 255 OP 636: Performed by: FAMILY MEDICINE

## 2020-08-06 PROCEDURE — 25000128 H RX IP 250 OP 636: Performed by: FAMILY MEDICINE

## 2020-08-06 PROCEDURE — 25000125 ZZHC RX 250: Performed by: FAMILY MEDICINE

## 2020-08-06 PROCEDURE — 40000863 ZZH STATISTIC RADIOLOGY XRAY, US, CT, MAR, NM

## 2020-08-06 PROCEDURE — 62323 NJX INTERLAMINAR LMBR/SAC: CPT

## 2020-08-06 RX ORDER — NICOTINE POLACRILEX 4 MG
15-30 LOZENGE BUCCAL
Status: DISCONTINUED | OUTPATIENT
Start: 2020-08-06 | End: 2020-08-06 | Stop reason: HOSPADM

## 2020-08-06 RX ORDER — IOPAMIDOL 408 MG/ML
10 INJECTION, SOLUTION INTRATHECAL ONCE
Status: COMPLETED | OUTPATIENT
Start: 2020-08-06 | End: 2020-08-06

## 2020-08-06 RX ORDER — BETAMETHASONE SODIUM PHOSPHATE AND BETAMETHASONE ACETATE 3; 3 MG/ML; MG/ML
3 INJECTION, SUSPENSION INTRA-ARTICULAR; INTRALESIONAL; INTRAMUSCULAR; SOFT TISSUE ONCE
Status: COMPLETED | OUTPATIENT
Start: 2020-08-06 | End: 2020-08-06

## 2020-08-06 RX ORDER — DEXTROSE MONOHYDRATE 25 G/50ML
25-50 INJECTION, SOLUTION INTRAVENOUS
Status: DISCONTINUED | OUTPATIENT
Start: 2020-08-06 | End: 2020-08-06 | Stop reason: HOSPADM

## 2020-08-06 RX ADMIN — BETAMETHASONE SODIUM PHOSPHATE AND BETAMETHASONE ACETATE 3 ML: 3; 3 INJECTION, SUSPENSION INTRA-ARTICULAR; INTRALESIONAL; INTRAMUSCULAR; SOFT TISSUE at 09:37

## 2020-08-06 RX ADMIN — LIDOCAINE HYDROCHLORIDE 3 ML: 10 INJECTION, SOLUTION EPIDURAL; INFILTRATION; INTRACAUDAL; PERINEURAL at 09:37

## 2020-08-06 RX ADMIN — IOPAMIDOL 2 ML: 408 INJECTION, SOLUTION INTRATHECAL at 09:31

## 2020-08-06 RX ADMIN — LIDOCAINE HYDROCHLORIDE 3 ML: 10 INJECTION, SOLUTION EPIDURAL; INFILTRATION; INTRACAUDAL; PERINEURAL at 09:30

## 2020-08-06 NOTE — PROCEDURES
[facility]    Procedure: L3-4 interlaminar JESUS    Date/Time: 8/6/2020 9:39 AM  Performed by: Monica Luis PA-C  Authorized by: Monica Luis PA-C     UNIVERSAL PROTOCOL   Site Marked: Yes  Prior Images Obtained and Reviewed:  Yes  Required items: Required blood products, implants, devices and special equipment available    Patient identity confirmed:  Verbally with patient  NA - No sedation, light sedation, or local anesthesia  Confirmation Checklist:  Patient's identity using two indicators, relevant allergies, procedure was appropriate and matched the consent or emergent situation and correct equipment/implants were available  Time out: Immediately prior to the procedure a time out was called    Universal Protocol: the Joint Commission Universal Protocol was followed    Preparation: Patient was prepped and draped in usual sterile fashion           ANESTHESIA    Anesthesia: Local infiltration  Local Anesthetic:  Lidocaine 1% without epinephrine      SEDATION    Patient Sedated: No    See dictated procedure note for full details.  PROCEDURE   Patient Tolerance:  Patient tolerated the procedure well with no immediate complications    Length of time physician/provider present for 1:1 monitoring during sedation: 0

## 2020-08-06 NOTE — DISCHARGE SUMMARY
After 20 minutes post injection, injection site is CDI, no hematoma or swelling or drainage.  Patient states pain is relieved.  Tolerating fluids.  Denies numbness or tingling in hands and feet.  Patient discharged to home with . Discharge instructions reviewed with patient and patient verbalizes understanding.      Care Suites Discharge Nursing Note    Patient Information  Name: Tl Sands  Age: 49 year old    Discharge Education:  Discharge instructions reviewed: Yes  Additional education/resources provided: no  Patient/patient representative verbalizes understanding: Yes  Patient discharging on new medications: No  Medication education completed: N/A    Discharge Plans:   Discharge location: home  Discharge ride contacted: Yes  Approximate discharge time: 1010    Discharge Criteria:  Discharge criteria met and vital signs stable: Yes    Patient Belongs:  Patient belongings returned to patient: Yes    Chuck Brooks RN

## 2020-08-06 NOTE — DISCHARGE INSTRUCTIONS
Steroid Injection Discharge Instructions     After you go home:      You may resume your normal diet.    Care of Puncture Site:      If you have a bandaid on your puncture site, you may remove it the next morning    You may shower tomorrow    No bath tubs, whirlpools or swimming for at least 3 days     Activity:      You may go back to normal activity in 24 hours    You should let pain be your guide as to the extent of your activities    Maintain any activity limitations as ordered by your provider    Do NOT drive a vehicle if you develop numbness in your arm or leg    Medicines:      You may resume all medications    For minor pain, you may take Acetaminophen (Tylenol) or Ibuprofen (Advil)    Pain:       You may experience increased or different pain over the next 24-48 hours    For the next 48 hrs - you may use ice packs for discomfort     Call your primary care doctor if:      You have severe pain that does not improve with pain medication    You have chills or a fever greater than 101 F (38 C)    The site is red, swollen, hot or tender    New problems with your bowel or bladder    Any questions or concerns    Other Instructions:      New numbness down your leg post injection is temporary and may last for up to 6 hours. You may need assistance with activity until your leg has normal sensation.    If you are diabetic, monitor your blood sugar closely. Contact the provider who manages your diabetes to help you control your blood sugar if needed.    For Your Information:      A steroid was injected to help decrease swelling and may help to reduce pain. It may take up to 7-10 days to obtain full results.    Some patients will get lasting relief from a single injection. Others may require up to 3 injections to get results. If you have more than one steroid injection, they should be given 2 weeks apart.    Side effects of your steroid injection are mild and will go away in 2-3 days  - Insomnia  - Heartburn  - Flushed  face  - Water retention  - Increased appetite  - Increased blood sugar      If you have questions call:        [facility] Dept @ [phone]

## 2020-08-06 NOTE — PROGRESS NOTES
Care Suites Admission Nursing Note    Patient Information  Name: Tl Sands  Age: 49 year old  Reason for admission: epidural  Care Suites arrival time: 0830    Patient Admission/Assessment   Pre-procedure assessment complete: Yes  If abnormal assessment/labs, provider notified: N/A  NPO: N/A  Medications held per instructions/orders: N/A  Consent: obtained  If applicable, pregnancy test status: declined  Patient oriented to room: Yes  Education/questions answered: Yes  Plan/other:  obtain consent and proceed with scheduled treatment or intervention      Discharge Planning  Accompanied by: Quita Laxmi nikos - [phone]  Discharge name/phone number: Quita Hair [phone]  Discharge location: [place]    Chuck Brooks RN
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
## 2020-08-11 ENCOUNTER — OFFICE VISIT (OUTPATIENT)
Dept: DERMATOLOGY | Facility: CLINIC | Age: 50
End: 2020-08-11
Payer: COMMERCIAL

## 2020-08-11 DIAGNOSIS — Z86.19 HISTORY OF TINEA: ICD-10-CM

## 2020-08-11 DIAGNOSIS — L91.0 HYPERTROPHIC SCAR: ICD-10-CM

## 2020-08-11 DIAGNOSIS — L57.0 ACTINIC KERATOSIS: Primary | ICD-10-CM

## 2020-08-11 DIAGNOSIS — L21.9 SEBORRHEIC DERMATITIS: ICD-10-CM

## 2020-08-11 DIAGNOSIS — Z85.828 HISTORY OF NONMELANOMA SKIN CANCER: ICD-10-CM

## 2020-08-11 PROCEDURE — 99213 OFFICE O/P EST LOW 20 MIN: CPT | Mod: 25 | Performed by: DERMATOLOGY

## 2020-08-11 PROCEDURE — 11900 INJECT SKIN LESIONS </W 7: CPT | Performed by: DERMATOLOGY

## 2020-08-11 RX ORDER — ECONAZOLE NITRATE 10 MG/G
CREAM TOPICAL 2 TIMES DAILY
Qty: 60 G | Refills: 3 | Status: SHIPPED | OUTPATIENT
Start: 2020-08-11

## 2020-08-11 RX ORDER — FLUOROURACIL 50 MG/G
CREAM TOPICAL
Qty: 40 G | Refills: 0 | Status: SHIPPED | OUTPATIENT
Start: 2020-08-11

## 2020-08-11 RX ORDER — MOMETASONE FUROATE 1 MG/ML
SOLUTION TOPICAL
Qty: 60 ML | Refills: 11 | Status: SHIPPED | OUTPATIENT
Start: 2020-08-11

## 2020-08-11 ASSESSMENT — PAIN SCALES - GENERAL: PAINLEVEL: NO PAIN (0)

## 2020-08-11 NOTE — NURSING NOTE
Tl Sands's goals for this visit include:   Chief Complaint   Patient presents with     Derm Problem     Hx of NMSC - Hypertrophic scar back f/u. Scaly area on right temple. Xerosis cutis/hyperkeratotic heels using econazole, stable. Seborrheic dermatitis ears, using mometasone.       He requests these members of his care team be copied on today's visit information: N/A     PCP: Cathryn Leung    Referring Provider:  No referring provider defined for this encounter.    There were no vitals taken for this visit.    Do you need any medication refills at today's visit? No   LXIONG3, MEDICAL ASSISTANT

## 2020-08-11 NOTE — PATIENT INSTRUCTIONS
[facility] Dermatology Visit    Thank you for allowing us to participate in your care.   When should I call my doctor?    If you are worsening or not improving, please, contact us or seek urgent care as noted below.     Who should I call with questions (adults)?    [facility] (adult and pediatric): [phone]     [facility] (adult): [phone]    For urgent needs outside of business hours call the [facility] at [phone] and ask for the dermatology resident on call    If this is a medical emergency and you are unable to reach an ER, Call 911      Who should I call with questions (pediatric)?  [facility]- Pediatric Dermatology  Dr. Maida Salazar, Dr. Marissa Cast, Dr. Anju Moore, Christi Burrell, ROXIE Torres, Dr. Xiomara Calderon & Dr. Thai Nicholas  Non Urgent  Nurse Triage Line; [phone]- Lianet and Jessica HUTCHINSON Care Coordinatorjosemanuel Pennington (/Complex ) [phone]    If you need a prescription refill, please contact your pharmacy. Refills are approved or denied by our Physicians during normal business hours, Monday through Fridays  Per office policy, refills will not be granted if you have not been seen within the past year (or sooner depending on your child's condition)    Scheduling Information:  Pediatric Appointment Scheduling and Call Center [phone]  Radiology Scheduling- [phone]  Sedation Unit Scheduling- [phone]  [place] Scheduling- General [phone]; Pediatric Dermatology [phone]  Main  Services: [phone]  Sao Tomean: [phone]  Czech: [phone]  Hmong/Macedonian/Ortega: [phone]  Preadmission Nursing Department Fax Number: [phone] (Fax all pre-operative paperwork to this number)    For urgent matters arising during evenings, weekends, or holidays that cannot wait for normal business hours please  call [phone] and ask for the Dermatology Resident On-Call to be paged.      Efudex Treatment    Today, you are being prescribed Fluorouracil (Efudex) a topical cream used for the treatment of Actinic Keratosis (AK's).  The medication is working to eliminate the unhealthy cells. Even though this treatment may be unattractive and somewhat uncomfortable.    Your treatment will last twice daily for 2-3 weeks or until the onset of irritation on the temples.  You may experience some mild discomfort while being treated.    You will want to stop any other creams such as glycolic acid products, retin A, Tazorac, etc. to the area. You may use bland makeup/cover-up as long as it doesn't sting or cause you discomfort.    Apply the cream at night as your physician recommends. Use a cotton-tipped applicator, or use gloves if applying it with your fingertips. If applied with unprotected fingertips, it is important to wash your hands well after you apply this medicine.     Keep this medication away from pets.    We recommend avoiding excessive sun exposure to the treated area    You may use moisturizing creams over bothersome areas such as Vanicream or Cetaphil cream if the reaction becomes too bothersome. Please, call the clinic if this occurs.   Potential Side Effects    Your treated areas may be unsightly during therapy.  This will improve slowly following the discontinuation of therapy.     During the first week of application, mild inflammation may occur.     During the following weeks, redness, and swelling may occur with some crusting and burning.     Lesions resolve as the skin exfoliates.     Over 1 to 2 weeks, new skin grows into the treatment area.    Keep this medication away from pets  Specific side effects that usually do not require medical attention (report to your doctor or health care professional if they continue or are bothersome) include: Red or dark-colored skin     Mild erosion (loss of upper layer of  skin)     Mild eye irritation including burning, itching, sensitivity, stinging, or watering     Increased sensitivity of the skin to sun and ultraviolet light     Pain and burning of the affected area     Dryness, scaling or swelling of the affected area     Skin rash, itching of the affected area     Tenderness     If you have severe pain, please, call the clinic immediately and indicate that you have pain. Ask for a call from the RN.   Who should I call with questions?     [facility]: [phone]     [facility]: [phone]     For urgent needs outside of business hours call the [facility] at [phone]  and ask for the dermatology resident on call
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
## 2020-08-11 NOTE — NURSING NOTE
Drug [employer] Record    Drug Name: Kenalog  Dose: .2 cc   Route administered: SQ  NDC #: Kenalog-10 (0009-2242-28)    LOT #: [id number]  SITE: back   : [employer]  EXPIRATION DATE: 02/2022    JOSE, MEDICAL ASSISTANT

## 2020-08-13 DIAGNOSIS — M54.50 CHRONIC BILATERAL LOW BACK PAIN WITHOUT SCIATICA: Primary | ICD-10-CM

## 2020-08-13 DIAGNOSIS — G89.29 CHRONIC BILATERAL LOW BACK PAIN WITHOUT SCIATICA: Primary | ICD-10-CM

## 2020-08-21 ENCOUNTER — PRE VISIT (OUTPATIENT)
Dept: ORTHOPEDICS | Facility: CLINIC | Age: 50
End: 2020-08-21

## 2020-09-15 ENCOUNTER — ANCILLARY PROCEDURE (OUTPATIENT)
Dept: GENERAL RADIOLOGY | Facility: CLINIC | Age: 50
End: 2020-09-15
Attending: ORTHOPAEDIC SURGERY
Payer: COMMERCIAL

## 2020-09-15 ENCOUNTER — OFFICE VISIT (OUTPATIENT)
Dept: ORTHOPEDICS | Facility: CLINIC | Age: 50
End: 2020-09-15
Payer: COMMERCIAL

## 2020-09-15 VITALS — HEIGHT: 70 IN | BODY MASS INDEX: 30.06 KG/M2 | WEIGHT: 210 LBS

## 2020-09-15 DIAGNOSIS — M54.50 CHRONIC BILATERAL LOW BACK PAIN WITHOUT SCIATICA: Primary | ICD-10-CM

## 2020-09-15 DIAGNOSIS — G89.29 CHRONIC BILATERAL LOW BACK PAIN WITHOUT SCIATICA: Primary | ICD-10-CM

## 2020-09-15 RX ORDER — MELOXICAM 15 MG/1
15 TABLET ORAL DAILY
Qty: 30 TABLET | Refills: 0 | Status: SHIPPED | OUTPATIENT
Start: 2020-09-15 | End: 2020-10-15

## 2020-09-15 ASSESSMENT — MIFFLIN-ST. JEOR: SCORE: 1810.86

## 2020-09-15 NOTE — NURSING NOTE
"Reason For Visit:   Chief Complaint   Patient presents with     Consult     lumbar radiculopathy patient is having low back pain with slight leg symptoms every now and again.        Primary MD: Cathryn Leung  Ref. MD: Prasanna     ?  No  Date of injury: a few years ago   Type of injury: chronic .  Date of surgery: none   Type of surgery: none .  Smoker: No  Request smoking cessation information: No    Ht 1.765 m (5' 9.5\")   Wt 95.3 kg (210 lb)   BMI 30.57 kg/m           Oswestry (LB) Questionnaire    No flowsheet data found.         Neck Disability Index (NDI) Questionnaire    No flowsheet data found.                Promis 10 Assessment    No flowsheet data found.             Eliud Wilder, ATC  "
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
## 2020-09-15 NOTE — TELEPHONE ENCOUNTER
Per Dr. Roa's request, RN placed a lowest dose Mobic script for patient. Subsequent refills so go back to his primary care provider.    Alma Robles RN

## 2020-09-18 ENCOUNTER — TELEPHONE (OUTPATIENT)
Dept: ENDOCRINOLOGY | Facility: CLINIC | Age: 50
End: 2020-09-18

## 2020-09-18 DIAGNOSIS — E10.9 TYPE I DIABETES MELLITUS, WELL CONTROLLED (H): Primary | ICD-10-CM

## 2020-09-18 NOTE — TELEPHONE ENCOUNTER
Writer called pt to switch appointment on 9/29  to virtual. Pt agreed but requests the doctor for labs prior to appointment    Routing to Dr. Resendiz to review    Amanda Ochoa MA  Adult Endocrinology  [facility]

## 2020-09-22 NOTE — TELEPHONE ENCOUNTER
Writer left message for pt to inform  Labs had been ordered by Dr. Resendiz and to call [phone] to schedule.    Amanda Ochoa MA  Adult Endocrinology  [facility]

## 2020-09-29 ENCOUNTER — VIRTUAL VISIT (OUTPATIENT)
Dept: ENDOCRINOLOGY | Facility: CLINIC | Age: 50
End: 2020-09-29
Payer: COMMERCIAL

## 2020-09-29 DIAGNOSIS — E10.9 TYPE I DIABETES MELLITUS, WELL CONTROLLED (H): ICD-10-CM

## 2020-09-29 DIAGNOSIS — K21.9 GASTROESOPHAGEAL REFLUX DISEASE, ESOPHAGITIS PRESENCE NOT SPECIFIED: ICD-10-CM

## 2020-09-29 DIAGNOSIS — E78.00 HYPERCHOLESTEREMIA: ICD-10-CM

## 2020-09-29 DIAGNOSIS — E10.9 TYPE 1 DIABETES MELLITUS WITHOUT COMPLICATION (H): Primary | ICD-10-CM

## 2020-09-29 LAB
ALBUMIN SERPL-MCNC: 3.6 G/DL (ref 3.4–5)
ALP SERPL-CCNC: 83 U/L (ref 40–150)
ALT SERPL W P-5'-P-CCNC: 31 U/L (ref 0–70)
ANION GAP SERPL CALCULATED.3IONS-SCNC: 3 MMOL/L (ref 3–14)
AST SERPL W P-5'-P-CCNC: 18 U/L (ref 0–45)
BILIRUB SERPL-MCNC: 0.5 MG/DL (ref 0.2–1.3)
BUN SERPL-MCNC: 21 MG/DL (ref 7–30)
CALCIUM SERPL-MCNC: 8.4 MG/DL (ref 8.5–10.1)
CHLORIDE SERPL-SCNC: 107 MMOL/L (ref 94–109)
CHOLEST SERPL-MCNC: 141 MG/DL
CO2 SERPL-SCNC: 29 MMOL/L (ref 20–32)
CREAT SERPL-MCNC: 0.81 MG/DL (ref 0.66–1.25)
CREAT UR-MCNC: 237 MG/DL
GFR SERPL CREATININE-BSD FRML MDRD: >90 ML/MIN/{1.73_M2}
GLUCOSE SERPL-MCNC: 226 MG/DL (ref 70–99)
HBA1C MFR BLD: 6.7 % (ref 0–5.6)
HCT VFR BLD AUTO: 42.4 % (ref 40–53)
HDLC SERPL-MCNC: 54 MG/DL
LDLC SERPL CALC-MCNC: 69 MG/DL
MICROALBUMIN UR-MCNC: 10 MG/L
MICROALBUMIN/CREAT UR: 4.15 MG/G CR (ref 0–17)
NONHDLC SERPL-MCNC: 87 MG/DL
POTASSIUM SERPL-SCNC: 4.2 MMOL/L (ref 3.4–5.3)
PROT SERPL-MCNC: 6.8 G/DL (ref 6.8–8.8)
SODIUM SERPL-SCNC: 139 MMOL/L (ref 133–144)
TRIGL SERPL-MCNC: 91 MG/DL

## 2020-09-29 PROCEDURE — 80061 LIPID PANEL: CPT | Performed by: INTERNAL MEDICINE

## 2020-09-29 PROCEDURE — 83036 HEMOGLOBIN GLYCOSYLATED A1C: CPT | Performed by: INTERNAL MEDICINE

## 2020-09-29 PROCEDURE — 80053 COMPREHEN METABOLIC PANEL: CPT | Performed by: INTERNAL MEDICINE

## 2020-09-29 PROCEDURE — 95251 CONT GLUC MNTR ANALYSIS I&R: CPT | Performed by: INTERNAL MEDICINE

## 2020-09-29 PROCEDURE — 99214 OFFICE O/P EST MOD 30 MIN: CPT | Mod: GT | Performed by: INTERNAL MEDICINE

## 2020-09-29 PROCEDURE — 36415 COLL VENOUS BLD VENIPUNCTURE: CPT | Performed by: INTERNAL MEDICINE

## 2020-09-29 PROCEDURE — 82043 UR ALBUMIN QUANTITATIVE: CPT | Performed by: INTERNAL MEDICINE

## 2020-09-29 PROCEDURE — 85014 HEMATOCRIT: CPT | Performed by: INTERNAL MEDICINE
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
## 2020-09-29 NOTE — PROGRESS NOTES
"Tl Sands is a 49 year old male who is being evaluated via a billable video visit.      The patient has been notified of following:     \"This video visit will be conducted via a call between you and your physician/provider. We have found that certain health care needs can be provided without the need for an in-person physical exam.  This service lets us provide the care you need with a video conversation.  If a prescription is necessary we can send it directly to your pharmacy.  If lab work is needed we can place an order for that and you can then stop by our lab to have the test done at a later time.    Video visits are billed at different rates depending on your insurance coverage.  Please reach out to your insurance provider with any questions.    If during the course of the call the physician/provider feels a video visit is not appropriate, you will not be charged for this service.\"    Patient has given verbal consent for Video visit? Yes  How would you like to obtain your AVS? MyChart  If you are dropped from the video visit, the video invite should be resent to: Text to cell phone: [phone]  Will anyone else be joining your video visit? No     Phoebe Lira CMA  Adult Endocrinology  [facility]      "
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
## 2020-10-15 ENCOUNTER — MYC REFILL (OUTPATIENT)
Dept: ORTHOPEDICS | Facility: CLINIC | Age: 50
End: 2020-10-15

## 2020-10-15 DIAGNOSIS — M54.50 CHRONIC BILATERAL LOW BACK PAIN WITHOUT SCIATICA: ICD-10-CM

## 2020-10-15 DIAGNOSIS — G89.29 CHRONIC BILATERAL LOW BACK PAIN WITHOUT SCIATICA: ICD-10-CM

## 2020-10-19 DIAGNOSIS — E10.9 DM W/O COMPLICATION TYPE I (H): ICD-10-CM

## 2020-10-19 RX ORDER — MELOXICAM 15 MG/1
15 TABLET ORAL DAILY
Qty: 30 TABLET | Refills: 0 | Status: SHIPPED | OUTPATIENT
Start: 2020-10-19 | End: 2020-12-23

## 2020-10-22 ENCOUNTER — VIRTUAL VISIT (OUTPATIENT)
Dept: GASTROENTEROLOGY | Facility: CLINIC | Age: 50
End: 2020-10-22
Attending: INTERNAL MEDICINE
Payer: COMMERCIAL

## 2020-10-22 DIAGNOSIS — Z53.9 NO SHOW: Primary | ICD-10-CM

## 2020-10-22 RX ORDER — PROCHLORPERAZINE 25 MG/1
SUPPOSITORY RECTAL
Qty: 1 EACH | Refills: 0 | OUTPATIENT
Start: 2020-10-22

## 2020-10-22 RX ORDER — PROCHLORPERAZINE 25 MG/1
SUPPOSITORY RECTAL
COMMUNITY
Start: 2020-10-20 | End: 2022-07-22

## 2020-10-22 NOTE — TELEPHONE ENCOUNTER
DEXCOM G6 TRANSMIT  MIS  Continuous Blood Gluc Transmit (DEXCOM G6 TRANSMITTER) MISC 1 each 3 10/19/2020  --   Sig: Change every 3 months.   Sent to pharmacy as: Dexcom G6 Transmitter   Class: E-Prescribe   Order: [id number]   E-Prescribing Status: Receipt confirmed by pharmacy (10/19/2020  3:18 PM CDT)   Printout Tracking    External Result Report   Medication Administration Instructions    Change every 3 months.   Pharmacy    [facility] [address] [place] [address] [address] [facility]

## 2020-10-22 NOTE — PROGRESS NOTES
Called patient for today's telephone visit. No answer. LVM notifying patient that I would re-attempt once more.     A second attempt was made for today's telephone visit however there was no answer.      Patient advised to call back to reschedule.       Shaw May PA-C  Gastroenterology  [facility]

## 2020-10-22 NOTE — PROGRESS NOTES
"Tl Sands is a 50 year old male who is being evaluated via a billable telephone visit.      The patient has been notified of following:     \"This telephone visit will be conducted via a call between you and your physician/provider. We have found that certain health care needs can be provided without the need for a physical exam.  This service lets us provide the care you need with a short phone conversation.  If a prescription is necessary we can send it directly to your pharmacy.  If lab work is needed we can place an order for that and you can then stop by our lab to have the test done at a later time.    Telephone visits are billed at different rates depending on your insurance coverage. During this emergency period, for some insurers they may be billed the same as an in-person visit.  Please reach out to your insurance provider with any questions.    If during the course of the call the physician/provider feels a telephone visit is not appropriate, you will not be charged for this service.\"    Patient has given verbal consent for Telephone visit?  Yes    What phone number would you like to be contacted at? [phone]  How would you like to obtain your AVS? [facility]    Phone call duration:  minutes    Niles Yoo MA      "

## 2020-10-29 ENCOUNTER — MYC MEDICAL ADVICE (OUTPATIENT)
Dept: ENDOCRINOLOGY | Facility: CLINIC | Age: 50
End: 2020-10-29

## 2020-10-30 NOTE — TELEPHONE ENCOUNTER
Insulin treated diabetes report form received. Form has been completed to the best of writers ability and has been emailed to Dr. Resendiz to be complete and sign.    Phoebe Lira, [facility]  Adult Endocrinology  [facility]

## 2020-11-02 NOTE — TELEPHONE ENCOUNTER
Insulin-Treated Diabetes Mellitus Report completed and signed by Dr. Resendiz.    Faxed to [place] [employer] [address] and [employer] [phone].    Original mailed to patient. Copy sent down to scanning.     Phoebe Lira, [facility]  Adult Endocrinology  [facility]

## 2020-11-04 ENCOUNTER — VIRTUAL VISIT (OUTPATIENT)
Dept: GASTROENTEROLOGY | Facility: CLINIC | Age: 50
End: 2020-11-04
Payer: COMMERCIAL

## 2020-11-04 DIAGNOSIS — R05.3 CHRONIC COUGH: Primary | ICD-10-CM

## 2020-11-04 PROCEDURE — 99202 OFFICE O/P NEW SF 15 MIN: CPT | Mod: TEL | Performed by: INTERNAL MEDICINE

## 2020-11-04 RX ORDER — OMEPRAZOLE 40 MG/1
40 CAPSULE, DELAYED RELEASE ORAL 2 TIMES DAILY
Qty: 60 CAPSULE | Refills: 1 | Status: SHIPPED | OUTPATIENT
Start: 2020-11-04 | End: 2021-08-17

## 2020-11-04 NOTE — PROGRESS NOTES
"Tl Sands is a 50 year old male who is being evaluated via a billable telephone visit.      The patient has been notified of following:     \"This telephone visit will be conducted via a call between you and your physician/provider. We have found that certain health care needs can be provided without the need for a physical exam.  This service lets us provide the care you need with a short phone conversation.  If a prescription is necessary we can send it directly to your pharmacy.  If lab work is needed we can place an order for that and you can then stop by our lab to have the test done at a later time.    Telephone visits are billed at different rates depending on your insurance coverage. During this emergency period, for some insurers they may be billed the same as an in-person visit.  Please reach out to your insurance provider with any questions.    If during the course of the call the physician/provider feels a telephone visit is not appropriate, you will not be charged for this service.\"    Patient has given verbal consent for Telephone visit?  Yes    What phone number would you like to be contacted at? [phone]    How would you like to obtain your AVS? [facility]    Phone call duration:  minutes    Niles Yoo MA      "

## 2020-11-04 NOTE — PATIENT INSTRUCTIONS
Start taking omeprazole 40mg twice daily - take this on an empty stomach and eat 30 to 60 minutes later.  Do this for two months.    Please also consider seeing an ear nose and throat physician and please establish care with a primary care provider.
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
## 2020-12-06 ENCOUNTER — HEALTH MAINTENANCE LETTER (OUTPATIENT)
Age: 50
End: 2020-12-06

## 2020-12-16 ENCOUNTER — MYC MEDICAL ADVICE (OUTPATIENT)
Dept: PEDIATRICS | Facility: CLINIC | Age: 50
End: 2020-12-16

## 2020-12-16 NOTE — TELEPHONE ENCOUNTER
I have not seen patient since December 2018  Please help him schedule either for office visit or virtual visits and any available slots this or next week

## 2020-12-21 ENCOUNTER — VIRTUAL VISIT (OUTPATIENT)
Dept: PEDIATRICS | Facility: CLINIC | Age: 50
End: 2020-12-21
Payer: COMMERCIAL

## 2020-12-21 DIAGNOSIS — R09.89 THROAT CLEARING: ICD-10-CM

## 2020-12-21 DIAGNOSIS — G89.29 CHRONIC BILATERAL LOW BACK PAIN WITHOUT SCIATICA: Primary | ICD-10-CM

## 2020-12-21 DIAGNOSIS — M54.50 CHRONIC BILATERAL LOW BACK PAIN WITHOUT SCIATICA: Primary | ICD-10-CM

## 2020-12-21 DIAGNOSIS — M51.369 DDD (DEGENERATIVE DISC DISEASE), LUMBAR: ICD-10-CM

## 2020-12-21 DIAGNOSIS — R05.3 CHRONIC COUGH: ICD-10-CM

## 2020-12-21 PROCEDURE — 99214 OFFICE O/P EST MOD 30 MIN: CPT | Mod: GT | Performed by: FAMILY MEDICINE

## 2020-12-21 NOTE — PATIENT INSTRUCTIONS
Schedule for physical and fasting labs in 6 months  Scheduled for GI follow-up with Dr. Gutierrez  Start taking Claritin 10 mg once daily in the morning and Flonase nasal sprays 2 sprays in each nostril once a day for 4 weeks  Start on physical therapy for lower back

## 2020-12-21 NOTE — PROGRESS NOTES
"Tl Sands is a 50 year old male who is being evaluated via a billable video visit.      The patient has been notified of following:     \"This video visit will be conducted via a call between you and your physician/provider. We have found that certain health care needs can be provided without the need for an in-person physical exam.  This service lets us provide the care you need with a video conversation.  If a prescription is necessary we can send it directly to your pharmacy.  If lab work is needed we can place an order for that and you can then stop by our lab to have the test done at a later time.    Video visits are billed at different rates depending on your insurance coverage.  Please reach out to your insurance provider with any questions.    If during the course of the call the physician/provider feels a video visit is not appropriate, you will not be charged for this service.\"    Patient has given verbal consent for Video visit? Yes  How would you like to obtain your AVS? MyChart  If you are dropped from the video visit, the video invite should be resent to: Text to cell phone: [phone]  Will anyone else be joining your video visit? No      Subjective     Tl Sands is a 50 year old male who presents today via video visit for the following health issues:    HPI     Pt has question of his medication of meloxicam wants to know if this is short term drug or long term drug.    Patient is here for a video visit instead of in person visit due to the current COVID-19 pandemic.  Patient with past medical history significant for type 1 diabetes, hyperlipidemia, chronic low back pain from lumbar degenerative disc disease is here for video visit with concerns of having intermittent ongoing low back pain with the need to take NSAID medications  He is concerned with the long-term side effects of ongoing NSAID use  He feels significant symptom relief when he takes meloxicam once a day  Denies previous " history of kidney disorder.  Does have ongoing concerns with chronic cough and throat clearing, was seen by gastroenterologist and was given prescription for Prilosec for suspected GERD causing his chronic cough symptoms  Patient reported no history of smoking, allergies per did not notice complete clearance of symptoms when he went to [place] as was also noted by his wife.  He briefly tried numerous nasal sprays in the past but not consistently  Denies history of asthma, shortness of breath, heartburn, abdominal pain, bloating, change in bowel movements, melena, hematemesis  Denies fever, chills, sore throat  He has low back pain without sciatica for more than 10+ years was seen by, spine surgeon.  Had a plan for spinal fusion but not proceeded yet.  Patient has not started on physical therapy yet          Video Start Time: 9:13am        Review of Systems   CONSTITUTIONAL: NEGATIVE for fever, chills, change in weight  INTEGUMENTARY/SKIN: NEGATIVE for worrisome rashes, moles or lesions  EYES: NEGATIVE for vision changes or irritation  ENT/MOUTH: as above  RESP:as above  CV: NEGATIVE for chest pain, palpitations or peripheral edema  GI: as above  MUSCULOSKELETAL: Chronic low back pain  NEURO: NEGATIVE for weakness, dizziness or paresthesias  ENDOCRINE: History of type 1 diabetes  HEME/ALLERGY/IMMUNE: NEGATIVE for bleeding problems  PSYCHIATRIC: NEGATIVE for changes in mood or affect      Objective           Vitals:  No vitals were obtained today due to virtual visit.    Physical Exam     GENERAL: Healthy, alert and no distress  EYES: Eyes grossly normal to inspection  RESP: No audible wheeze, cough, or visible cyanosis.  No visible retractions or increased work of breathing.    SKIN: Visible skin clear  PSYCH: Mentation appears normal, affect normal/bright, judgement and insight intact, normal speech and appearance well-groomed.              Assessment & Plan     Chronic bilateral low back pain without sciatica  Last  Comprehensive Metabolic Panel:  Sodium   Date Value Ref Range Status   09/29/2020 139 133 - 144 mmol/L Final     Potassium   Date Value Ref Range Status   09/29/2020 4.2 3.4 - 5.3 mmol/L Final     Chloride   Date Value Ref Range Status   09/29/2020 107 94 - 109 mmol/L Final     Carbon Dioxide   Date Value Ref Range Status   09/29/2020 29 20 - 32 mmol/L Final     Anion Gap   Date Value Ref Range Status   09/29/2020 3 3 - 14 mmol/L Final     Glucose   Date Value Ref Range Status   09/29/2020 226 (H) 70 - 99 mg/dL Final     Comment:     Non Fasting     Urea Nitrogen   Date Value Ref Range Status   09/29/2020 21 7 - 30 mg/dL Final     Creatinine   Date Value Ref Range Status   09/29/2020 0.81 0.66 - 1.25 mg/dL Final     GFR Estimate   Date Value Ref Range Status   09/29/2020 >90 >60 mL/min/[1.73_m2] Final     Comment:     Non  GFR Calc  Starting 12/18/2018, serum creatinine based estimated GFR (eGFR) will be   calculated using the Chronic Kidney Disease Epidemiology Collaboration   (CKD-EPI) equation.       Calcium   Date Value Ref Range Status   09/29/2020 8.4 (L) 8.5 - 10.1 mg/dL Final     Reviewed normal kidney functions from 2 months ago  Patient will continue to take meloxicam 15 mg once a day as needed but understands the long-term side effects including renal and GI including bleeds  Reviewed upper GI endoscopy, December 2018 showing duodenitis  Recommended patient to start on physical therapy for core strengthening and for prevention of recurrent episodes of back pain to avoid frequent NSAID use   Patient verbalised understanding and is agreeable to the plan.    - [facility]; Future    DDD (degenerative disc disease), lumbar  as above    - [facility]; Future    Throat clearing  DDX-postnasal drip versus renal esophageal reflux causing his chronic cough and throat clearing sensation  Recommended to give a trial of Claritin 10 mg once daily  "in the morning and Flonase nasal sprays 2 sprays in each nostril once a day for at least 4 weeks    Chronic cough  On omeprazole 40 mg per GI  Patient is overdue for follow-up, will schedule for follow-up    He will get his annual physical and fasting labs in 6 months with PCP       BMI:   Estimated body mass index is 30.57 kg/m  as calculated from the following:    Height as of 9/15/20: 1.765 m (5' 9.5\").    Weight as of 9/15/20: 95.3 kg (210 lb).            Chart documentation done in part with Dragon Voice recognition Software. Although reviewed after completion, some word and grammatical error may remain.    See Patient Instructions    Return in about 6 months (around 6/21/2021) for Physical Exam, fasting labs.    Jakub Wolff MD  [facility]      Video-Visit Details    Type of service:  Video Visit    Video End Time:9:25 AM    Originating Location (pt. Location): Home    Distant Location (provider location):  [facility]     Platform used for Video Visit: Onelia"

## 2021-01-06 ENCOUNTER — OFFICE VISIT (OUTPATIENT)
Dept: FAMILY MEDICINE | Facility: CLINIC | Age: 51
End: 2021-01-06
Payer: COMMERCIAL

## 2021-01-06 VITALS
BODY MASS INDEX: 33.06 KG/M2 | HEART RATE: 70 BPM | DIASTOLIC BLOOD PRESSURE: 90 MMHG | TEMPERATURE: 98.1 F | HEIGHT: 69 IN | WEIGHT: 223.25 LBS | SYSTOLIC BLOOD PRESSURE: 140 MMHG | OXYGEN SATURATION: 98 %

## 2021-01-06 DIAGNOSIS — N50.89 TESTICULAR MASS: Primary | ICD-10-CM

## 2021-01-06 DIAGNOSIS — E10.9 TYPE 1 DIABETES MELLITUS WITHOUT COMPLICATION (H): ICD-10-CM

## 2021-01-06 PROCEDURE — 83615 LACTATE (LD) (LDH) ENZYME: CPT | Performed by: INTERNAL MEDICINE

## 2021-01-06 PROCEDURE — 36415 COLL VENOUS BLD VENIPUNCTURE: CPT | Performed by: INTERNAL MEDICINE

## 2021-01-06 PROCEDURE — 82105 ALPHA-FETOPROTEIN SERUM: CPT | Performed by: INTERNAL MEDICINE

## 2021-01-06 PROCEDURE — 84702 CHORIONIC GONADOTROPIN TEST: CPT | Performed by: INTERNAL MEDICINE

## 2021-01-06 PROCEDURE — 99213 OFFICE O/P EST LOW 20 MIN: CPT | Performed by: INTERNAL MEDICINE

## 2021-01-06 ASSESSMENT — MIFFLIN-ST. JEOR: SCORE: 1865.78

## 2021-01-06 ASSESSMENT — PAIN SCALES - GENERAL: PAINLEVEL: NO PAIN (0)

## 2021-01-06 NOTE — PROGRESS NOTES
"  Assessment & Plan     Testicular mass  Mass noted on right testicle  This is around 1 cm and palpated at the inferior pole  It is from and not mobile  He has some tenderness on palpation  He has varicocele on the left side  Did see urologist in the past  Ultrasound from 2019 was reviewed by me today  Different  diagnosis include epididymal cyst versus any possible tumor  - US Testicular and Scrotum; Future  - HCG tumor marker  - Lactate Dehydrogenase  - UROLOGY ADULT REFERRAL  - AFP tumor marker    Type 1 diabetes mellitus without complication (H)  Reports that his blood sugars have been doing very good since he has a continuous blood glucose monitor  He does see a endocrinologist                20 minutes spent on the date of the encounter doing chart review, interpretation of tests, patient visit and documentation          BMI:   Estimated body mass index is 32.51 kg/m  as calculated from the following:    Height as of this encounter: 1.765 m (5' 9.49\").    Weight as of this encounter: 101.3 kg (223 lb 4 oz).             Return in about 3 months (around 4/6/2021).    Sukhi Drake MD  [facility]     Tl HEATHER Sands is a 50 year old who presents to clinic today for the following health issues     HPI       Genitourinary - Male  Onset/Duration: 1/3/21  Description:   Dysuria (painful urination): no}  Hematuria (blood in urine): no  Frequency: no  Waking at night to urinate: no  Hesitancy (delay in urine): no  Retention (unable to empty): no  Decrease in urinary flow: no  Incontinence: no  Progression of Symptoms:  same  Accompanying Signs & Symptoms:  Fever: no  Back/Flank pain: no  Urethral discharge: no  Testicle lumps/masses/pain: YES  Nausea and/or vomiting: no  Abdominal pain: no  History:   History of frequent UTI s: no  History of kidney stones: no  History of hernias: Yes  Personal or Family history of Prostate problems: No  Sexually active: YES  Precipitating " "or alleviating factors: None  Therapies tried and outcome: none        Review of Systems   Constitutional, HEENT, cardiovascular, pulmonary, gi and gu systems are negative, except as otherwise noted.      Objective    BP (!) 140/90 (BP Location: Left arm, Patient Position: Sitting, Cuff Size: Adult Large)   Pulse 70   Temp 98.1  F (36.7  C) (Oral)   Ht 1.765 m (5' 9.49\")   Wt 101.3 kg (223 lb 4 oz)   SpO2 98%   BMI 32.51 kg/m    Body mass index is 32.51 kg/m .  Physical Exam   GENERAL: healthy, alert and no distress  NECK: no adenopathy, no asymmetry, masses, or scars and thyroid normal to palpation   (male): Varicocele noted on left side  There is a small 1 cm swelling on the inferior pole of right testes  MS: no gross musculoskeletal defects noted, no edema                "

## 2021-01-07 LAB
AFP SERPL-MCNC: 3.7 UG/L (ref 0–8)
LDH SERPL L TO P-CCNC: 192 U/L (ref 85–227)

## 2021-01-08 ENCOUNTER — ANCILLARY PROCEDURE (OUTPATIENT)
Dept: ULTRASOUND IMAGING | Facility: CLINIC | Age: 51
End: 2021-01-08
Attending: INTERNAL MEDICINE
Payer: COMMERCIAL

## 2021-01-08 DIAGNOSIS — N50.89 TESTICULAR MASS: ICD-10-CM

## 2021-01-08 LAB — HCG-TM SERPL-ACNC: <3 IU/L

## 2021-01-08 PROCEDURE — 76870 US EXAM SCROTUM: CPT

## 2021-01-15 DIAGNOSIS — E10.9 TYPE 1 DIABETES MELLITUS WITHOUT COMPLICATION (H): Primary | ICD-10-CM

## 2021-01-17 RX ORDER — PEN NEEDLE, DIABETIC 32 GX 1/4"
NEEDLE, DISPOSABLE MISCELLANEOUS
Qty: 400 EACH | Refills: 2 | Status: SHIPPED | OUTPATIENT
Start: 2021-01-17 | End: 2022-09-22

## 2021-01-17 NOTE — TELEPHONE ENCOUNTER
9/29/2020  [facility] Rupali Pruett MD  Endocrinology, Diabetes, and Metabolism     Pen needle

## 2021-01-20 ENCOUNTER — VIRTUAL VISIT (OUTPATIENT)
Dept: GASTROENTEROLOGY | Facility: CLINIC | Age: 51
End: 2021-01-20
Payer: COMMERCIAL

## 2021-01-20 DIAGNOSIS — R05.3 CHRONIC COUGH: Primary | ICD-10-CM

## 2021-01-20 DIAGNOSIS — K21.9 GASTROESOPHAGEAL REFLUX DISEASE WITHOUT ESOPHAGITIS: ICD-10-CM

## 2021-01-20 PROCEDURE — 99213 OFFICE O/P EST LOW 20 MIN: CPT | Mod: TEL | Performed by: INTERNAL MEDICINE

## 2021-01-20 NOTE — PATIENT INSTRUCTIONS
I would like you to switch your omeprazole to once a day - if your reflux remains well controlled for a few weeks you could even try stopping the omeprazole and switching to as needed pepcid up to twice a day - if you continue to have reflux you can continue once daily omeprazole 20 mg.    Please see the ENT - if there is still concern for GERD as the cause of your chronic cough, let me know and we will set you up for pH monitoring.

## 2021-01-20 NOTE — PROGRESS NOTES
Kaz is a 50 year old who is being evaluated via a billable telephone visit.      What phone number would you like to be contacted at?[phone]  How would you like to obtain your AVS? dani Sunshine     Kaz is a 50 year old who presents to clinic today for the following health issues     HPI           Review of Systems         Objective           Vitals:  No vitals were obtained today due to virtual visit.    Physical Exam     PSYCH: Alert and oriented times 3; coherent speech, normal   rate and volume, able to articulate logical thoughts, able   to abstract reason, no tangential thoughts, no hallucinations   or delusions  His affect is   RESP: No cough, no audible wheezing, able to talk in full sentences  Remainder of exam unable to be completed due to telephone visits         Phone call duration: minutes  Niles Yoo CMA
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
## 2021-02-02 ENCOUNTER — OFFICE VISIT (OUTPATIENT)
Dept: UROLOGY | Facility: CLINIC | Age: 51
End: 2021-02-02
Payer: COMMERCIAL

## 2021-02-02 DIAGNOSIS — N43.40 SPERMATOCELE: Primary | ICD-10-CM

## 2021-02-02 PROCEDURE — 99213 OFFICE O/P EST LOW 20 MIN: CPT | Performed by: UROLOGY

## 2021-02-02 NOTE — PROGRESS NOTES
"[facility]  CHIEF COMPLAINT   It was my pleasure to see Tl Sands who is a 50 year old male for follow-up of spermatocele.      HPI   Tl Sands is a very pleasant 50 year old male who was initially seen by me on 12/26/2019 for hematospermia    TODAY:  His hematospermia has totally resolved with no further instances  Noticed recently a slight irritation of the right testicle and a small lump just posterior to the right testicle  This is slightly tender - \"just feels different\"  No very bothersome  At worst it is a slight discomfort    PHYSICAL EXAM  Patient is a 50 year old  male   Vitals: There were no vitals taken for this visit.  There is no height or weight on file to calculate BMI.  General Appearance Adult:   Alert, no acute distress, oriented  HENT: throat/mouth:normal, good dentition  Lungs: no respiratory distress, or pursed lip breathing  Heart: No obvious jugular venous distension present  Abdomen: soft, nontender, no organomegaly or masses  Musculoskeltal: extremities normal, no peripheral edema  Skin: no suspicious lesions or rashes  Neuro: Alert, oriented, speech and mentation normal  Psych: affect and mood normal  Gait: Normal  : normal phallus, normal testis bilaterally, left grade I varicocele, small RIGHT spermatocele, no clinical hydroceles    IMAGING:  All pertinent imaging reviewed:    All imaging studies reviewed by me.  I personally reviewed these imaging films.  A formal report from radiology will follow.    TESTICULAR U/S:  Findings:  The testes demonstrate normal and symmetric echotexture and  vascularity. No evidence of a focal lesion.  The right testicle  measures 4.1 x 2.7 x 3.0 cm and the left measures 4.4 x 2.0 x 2.9 cm.  There is no evidence of torsion.     Small right hydrocele. Bilateral varicoceles.     The right epididymis is normal limits. Left epididymal head cyst  measuring 2 mm.                                                    Impression:   1.  No " intratesticular mass. Small benign left epididymal head cyst.  2.  Bilateral varicoceles.  3.  Small right hydrocele.    ASSESSMENT and PLAN  50-year-old man with a right spermatocele    Spermatocele  -We discussed that per his exam and my exam he does have a very small right-sided spermatocele.  We discussed that his scrotal ultrasound was documented as a small 2 mm left epididymal cyst  -We discussed that spermatoceles are a benign cystic structure that do not have malignant potential  -Given his very minimal symptoms from this, I recommend observation  -We discussed that no further targeted follow-up or imaging is necessary  -F/U with me PRN    Chart documentation with Dragon Voice recognition Software. Although reviewed after completion, some words and grammatical errors may remain.     Time spent: 20 minutes spent on the date of the encounter doing chart review, history and exam, documentation and further activities as noted above.    Jimmy Rdz MD   Urology  [facility]  [facility] Phone: [phone]  [facility] Phone: [phone]

## 2021-02-02 NOTE — NURSING NOTE
Tl Sands's goals for this visit include:   Chief Complaint   Patient presents with     Follow Up     Right testicle mass       He requests these members of his care team be copied on today's visit information:     PCP: Jakub Wolff    Referring Provider:  Sukhi Drake MD  [address] [place] [address]    There were no vitals taken for this visit.    Do you need any medication refills at today's visit? No    Erma Wood LPN

## 2021-02-22 DIAGNOSIS — M54.50 CHRONIC BILATERAL LOW BACK PAIN WITHOUT SCIATICA: ICD-10-CM

## 2021-02-22 DIAGNOSIS — G89.29 CHRONIC BILATERAL LOW BACK PAIN WITHOUT SCIATICA: ICD-10-CM

## 2021-02-23 RX ORDER — MELOXICAM 15 MG/1
TABLET ORAL
Qty: 30 TABLET | Refills: 0 | Status: SHIPPED | OUTPATIENT
Start: 2021-02-23 | End: 2021-03-24

## 2021-02-23 NOTE — TELEPHONE ENCOUNTER
Refill is sent as requested.  Please inform patient to schedule for blood pressure recheck either with us or address through

## 2021-02-23 NOTE — TELEPHONE ENCOUNTER
Routing refill request to provider for review/approval because:  Labs not current:  Normal CBC  BP is not below 140/90  Fails protocol    Shelia Wheat RN  [facility]

## 2021-02-23 NOTE — TELEPHONE ENCOUNTER
This writer attempted to contact pt on 02/23/21      Reason for call schedule BP check and left message.      If patient calls back:   Schedule Nurse Only appointment within 1 month with ancillary, document that pt called and close encounter         Gilda Verma

## 2021-03-24 ENCOUNTER — MYC MEDICAL ADVICE (OUTPATIENT)
Dept: FAMILY MEDICINE | Facility: CLINIC | Age: 51
End: 2021-03-24

## 2021-03-24 ENCOUNTER — TELEPHONE (OUTPATIENT)
Dept: FAMILY MEDICINE | Facility: CLINIC | Age: 51
End: 2021-03-24

## 2021-03-24 DIAGNOSIS — G89.29 CHRONIC BILATERAL LOW BACK PAIN WITHOUT SCIATICA: ICD-10-CM

## 2021-03-24 DIAGNOSIS — M54.50 CHRONIC BILATERAL LOW BACK PAIN WITHOUT SCIATICA: ICD-10-CM

## 2021-03-24 RX ORDER — MELOXICAM 15 MG/1
15 TABLET ORAL DAILY
Qty: 90 TABLET | Refills: 0 | Status: SHIPPED | OUTPATIENT
Start: 2021-03-24 | End: 2021-06-23

## 2021-03-24 NOTE — TELEPHONE ENCOUNTER
Patient is calling for a refill on his meloxicam. He wants to know if he can get more than a 30 day supply, or can get refills to get him through to his physical appointemnt, scheduled in June.Stacie Condon

## 2021-04-11 ENCOUNTER — HEALTH MAINTENANCE LETTER (OUTPATIENT)
Age: 51
End: 2021-04-11

## 2021-04-27 ENCOUNTER — TRANSFERRED RECORDS (OUTPATIENT)
Dept: HEALTH INFORMATION MANAGEMENT | Facility: CLINIC | Age: 51
End: 2021-04-27

## 2021-05-17 DIAGNOSIS — E10.9 TYPE 1 DIABETES MELLITUS WITHOUT COMPLICATION (H): Primary | ICD-10-CM

## 2021-05-17 RX ORDER — INSULIN LISPRO 100 [IU]/ML
INJECTION, SOLUTION INTRAVENOUS; SUBCUTANEOUS
Qty: 30 ML | Refills: 0 | Status: SHIPPED | OUTPATIENT
Start: 2021-05-17 | End: 2021-08-08

## 2021-05-17 NOTE — TELEPHONE ENCOUNTER
Short Acting Insulin Protocol Failed05/17/2021 11:55 AM   HgbA1C in past 3 or 6 months Protocol Details    Recent (6 mo) or future (30 days) visit within the authorizing provider's specialty

## 2021-05-17 NOTE — TELEPHONE ENCOUNTER
HumaLOG KwikPen 100 UNIT/ML Subcutaneous Solution Pen-injector  Last Written Prescription Date: 3/31/2020  Last Fill Quantity: 36,   # refills: 3  Last Office Visit : 9/29/2020  Future Office visit:  None    Routing refill request to provider for review/approval because:  Drug not on the [facility], [facility] or [facility] refill protocol or controlled substance      Rupali Vergara RN  Central Triage Red Flags/Med Refills

## 2021-05-18 NOTE — TELEPHONE ENCOUNTER
5/18 Called and spoke to patient. He is currently scheduled for follow up with Dr. Resendiz.     Yasmin messina Procedure   Orthopedics, Podiatry, Sports Medicine, ENT/Eye Specialties  [facility]   [phone]

## 2021-06-01 VITALS
BODY MASS INDEX: 29.78 KG/M2 | WEIGHT: 208 LBS | HEIGHT: 70 IN | HEIGHT: 70 IN | BODY MASS INDEX: 29.78 KG/M2 | WEIGHT: 208 LBS

## 2021-06-19 NOTE — ANESTHESIA PREPROCEDURE EVALUATION
Anesthesia Evaluation      Patient summary reviewed   No history of anesthetic complications     Airway   Mallampati: II  Neck ROM: full   Pulmonary - negative ROS and normal exam    breath sounds clear to auscultation                         Cardiovascular   Exercise tolerance: > or = 4 METS  (+) , hypercholesterolemia,     Rhythm: regular  Rate: normal,         Neuro/Psych - negative ROS     Endo/Other    (+) diabetes mellitus type 2 using insulin, obesity,      GI/Hepatic/Renal - negative ROS   (+) GERD well controlled,        Other findings:     Glucose:      Dental - normal exam                        Anesthesia Plan  Planned anesthetic: MAC    Propofol IV sedation    ASA 2   Induction: intravenous   Anesthetic plan and risks discussed with: patient and child/children    Post-op plan: routine recovery

## 2021-06-19 NOTE — OP NOTE
Operative Note    Name:  Tl Sands   PCP:  Provider Not in System   Procedure Date: 7/16/2018       Procedure(s): Bilateral lumbar 3, lumbar 4, and lumbar 5 medial branch blocks    Pre-Procedure Diagnosis:  Lumbar spondylosis without myelopathy or radiculopathy    Post-Procedure Diagnosis:    SAME    Surgeon(s):  David Plascencia MD     No Physician Assistant or First Assist has been documented in procedure     Anesthesia Type:  MAC with use of propofol    Findings:  No evidence of intravascular uptake    Operative Report:    Patient was taken to the procedure room placed in the prone position given a mac anesthesia with the use of propofol.  Subsequently the appropriate starting positions for the bilateral lumbar 3 4 and 5 medial branch blocks were identified on his skin and marked appropriately with a skin marker.  His skin was anesthetized with lidocaine after utilizing ChloraPrep for sterile prep.  A 3 inch spinal needle was then advanced to the left lumbar 4 and 5 transverse processes.  This was appropriate starting position for the L3 and L4 medial branches.  I then injected approximately 1/2 cc Omnipaque 300 dye no evidence of intravascular uptake was identified.  Injected 0.75 mL of 0.5% Marcaine.  I then performed the same procedure on the right side thus anesthetizing the right lumbar 3 and lumbar 4 medial branches.  I then placed the needles overlying the L5 medial branch and performed the same procedure with the injection of Omnipaque and then subsequently 0.75 mL of 0.5% Marcaine.    Estimated Blood Loss:   none    Specimens:    None       Drains:   None    Complications:    None    David Plascencia MD     Date: 7/16/2018   Time: 8:56 AM

## 2021-06-19 NOTE — ANESTHESIA POSTPROCEDURE EVALUATION
Patient: Tl Sands  BILATERAL LUMBAR 3, LUMBAR 4, LUMBAR 5 MEDIAL BRANCH BLOCKS  Anesthesia type: MAC    Patient location: Phase II Recovery  Last vitals:   Vitals:    07/16/18 0905   BP: 116/77   Pulse: 64   Resp: 16   Temp:    SpO2: 98%     Post vital signs: stable  Level of consciousness: awake and responds to simple questions  Post-anesthesia pain: pain controlled  Post-anesthesia nausea and vomiting: no  Pulmonary: unassisted, return to baseline  Cardiovascular: stable and blood pressure at baseline  Hydration: adequate  Anesthetic events: no    QCDR Measures:  ASA# 11 - Katey-op Cardiac Arrest: ASA11B - Patient did NOT experience unanticipated cardiac arrest  ASA# 12 - Katey-op Mortality Rate: ASA12B - Patient did NOT die  ASA# 13 - PACU Re-Intubation Rate: NA - No ETT / LMA used for case  ASA# 10 - Composite Anes Safety: ASA10A - No serious adverse event    Additional Notes:

## 2021-06-19 NOTE — ANESTHESIA CARE TRANSFER NOTE
Last vitals:   Vitals:    07/16/18 0905   BP: 116/77   Pulse: 64   Resp: 16   Temp:    SpO2: 98%     Patient's level of consciousness is drowsy  Spontaneous respirations: yes  Maintains airway independently: yes  Dentition unchanged: yes  Oropharynx: oropharynx clear of all foreign objects    QCDR Measures:  ASA# 20 - Surgical No Data Recorded  PQRS# 430 - Adult PONV Prevention: 4558F - Pt received => 2 anti-emetic agents (different classes) preop & intraop  ASA# 8 - Peds PONV Prevention: NA - Not pediatric patient, not GA or 2 or more risk factors NOT present  PQRS# 424 - Katey-op Temp Management: NA - MAC anesthesia or case < 60 minutes  PQRS# 426 - PACU Transfer Protocol: - Transfer of care checklist used  ASA# 14 - Acute Post-op Pain: ASA14A - Patient experienced pain >= 7 out of 10

## 2021-08-07 DIAGNOSIS — E10.9 TYPE 1 DIABETES MELLITUS WITHOUT COMPLICATION (H): ICD-10-CM

## 2021-08-08 RX ORDER — INSULIN LISPRO 100 [IU]/ML
INJECTION, SOLUTION INTRAVENOUS; SUBCUTANEOUS
Qty: 30 ML | Refills: 0 | Status: SHIPPED | OUTPATIENT
Start: 2021-08-08 | End: 2021-11-04

## 2021-08-08 NOTE — TELEPHONE ENCOUNTER
HUMALOG KWIKPEN 100 UNIT/ML soln  Last Written Prescription Date:  5/17/21  Last Fill Quantity: 30ml,   # refills: 0  Last Office Visit : 9/29/20  [facility]  Future Office visit:  9/15/21  [facility]    Routing refill request to provider for review/approval because: endo triage to fill
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
## 2021-08-17 ENCOUNTER — OFFICE VISIT (OUTPATIENT)
Dept: FAMILY MEDICINE | Facility: CLINIC | Age: 51
End: 2021-08-17
Payer: COMMERCIAL

## 2021-08-17 VITALS
DIASTOLIC BLOOD PRESSURE: 78 MMHG | WEIGHT: 215.2 LBS | BODY MASS INDEX: 30.81 KG/M2 | SYSTOLIC BLOOD PRESSURE: 138 MMHG | RESPIRATION RATE: 12 BRPM | HEIGHT: 70 IN | OXYGEN SATURATION: 98 % | HEART RATE: 76 BPM | TEMPERATURE: 97.8 F

## 2021-08-17 DIAGNOSIS — Z80.0 FAMILY HISTORY OF COLON CANCER: ICD-10-CM

## 2021-08-17 DIAGNOSIS — R05.3 CHRONIC COUGH: ICD-10-CM

## 2021-08-17 DIAGNOSIS — Z23 NEED FOR HEPATITIS B VACCINATION: ICD-10-CM

## 2021-08-17 DIAGNOSIS — Z12.5 PROSTATE CANCER SCREENING: ICD-10-CM

## 2021-08-17 DIAGNOSIS — Z23 NEED FOR SHINGLES VACCINE: ICD-10-CM

## 2021-08-17 DIAGNOSIS — G89.29 CHRONIC BILATERAL LOW BACK PAIN WITHOUT SCIATICA: ICD-10-CM

## 2021-08-17 DIAGNOSIS — K21.00 GASTROESOPHAGEAL REFLUX DISEASE WITH ESOPHAGITIS WITHOUT HEMORRHAGE: ICD-10-CM

## 2021-08-17 DIAGNOSIS — N52.9 ERECTILE DYSFUNCTION, UNSPECIFIED ERECTILE DYSFUNCTION TYPE: ICD-10-CM

## 2021-08-17 DIAGNOSIS — Z11.59 NEED FOR HEPATITIS C SCREENING TEST: ICD-10-CM

## 2021-08-17 DIAGNOSIS — E10.9 TYPE 1 DIABETES MELLITUS WITHOUT COMPLICATION (H): ICD-10-CM

## 2021-08-17 DIAGNOSIS — M54.50 CHRONIC BILATERAL LOW BACK PAIN WITHOUT SCIATICA: ICD-10-CM

## 2021-08-17 DIAGNOSIS — Z12.11 COLON CANCER SCREENING: ICD-10-CM

## 2021-08-17 DIAGNOSIS — Z00.00 ROUTINE GENERAL MEDICAL EXAMINATION AT A HEALTH CARE FACILITY: Primary | ICD-10-CM

## 2021-08-17 DIAGNOSIS — Z11.4 SCREENING FOR HIV (HUMAN IMMUNODEFICIENCY VIRUS): ICD-10-CM

## 2021-08-17 DIAGNOSIS — Z23 NEED FOR VACCINATION: ICD-10-CM

## 2021-08-17 PROCEDURE — 99396 PREV VISIT EST AGE 40-64: CPT | Mod: 25 | Performed by: FAMILY MEDICINE

## 2021-08-17 PROCEDURE — 90471 IMMUNIZATION ADMIN: CPT | Performed by: FAMILY MEDICINE

## 2021-08-17 PROCEDURE — 90746 HEPB VACCINE 3 DOSE ADULT IM: CPT | Performed by: FAMILY MEDICINE

## 2021-08-17 PROCEDURE — 99214 OFFICE O/P EST MOD 30 MIN: CPT | Mod: 25 | Performed by: FAMILY MEDICINE

## 2021-08-17 RX ORDER — OMEPRAZOLE 40 MG/1
40 CAPSULE, DELAYED RELEASE ORAL DAILY
Qty: 90 CAPSULE | Refills: 3 | Status: SHIPPED | OUTPATIENT
Start: 2021-08-17 | End: 2022-08-26

## 2021-08-17 RX ORDER — ATORVASTATIN CALCIUM 10 MG/1
5 TABLET, FILM COATED ORAL AT BEDTIME
Qty: 90 TABLET | Refills: 3 | Status: SHIPPED | OUTPATIENT
Start: 2021-08-17 | End: 2021-11-24

## 2021-08-17 RX ORDER — TADALAFIL 20 MG/1
TABLET ORAL
Qty: 20 TABLET | Status: SHIPPED | OUTPATIENT
Start: 2021-08-17 | End: 2022-08-26

## 2021-08-17 RX ORDER — MELOXICAM 15 MG/1
15 TABLET ORAL DAILY PRN
Qty: 90 TABLET | Refills: 0 | Status: SHIPPED | OUTPATIENT
Start: 2021-08-17 | End: 2023-01-10

## 2021-08-17 ASSESSMENT — ENCOUNTER SYMPTOMS
SORE THROAT: 0
HEARTBURN: 0
SHORTNESS OF BREATH: 0
DYSURIA: 0
DIARRHEA: 0
WEAKNESS: 0
CHILLS: 0
NERVOUS/ANXIOUS: 0
JOINT SWELLING: 0
COUGH: 0
NAUSEA: 0
ARTHRALGIAS: 0
MYALGIAS: 1
PARESTHESIAS: 0
HEMATOCHEZIA: 0
FREQUENCY: 0
CONSTIPATION: 0
EYE PAIN: 0
PALPITATIONS: 0
DIZZINESS: 0
ABDOMINAL PAIN: 0
HEADACHES: 0
FEVER: 0
HEMATURIA: 0

## 2021-08-17 ASSESSMENT — PAIN SCALES - GENERAL: PAINLEVEL: NO PAIN (0)

## 2021-08-17 ASSESSMENT — MIFFLIN-ST. JEOR: SCORE: 1833.42

## 2021-09-10 ENCOUNTER — MYC MEDICAL ADVICE (OUTPATIENT)
Dept: ENDOCRINOLOGY | Facility: CLINIC | Age: 51
End: 2021-09-10

## 2021-09-10 ENCOUNTER — LAB (OUTPATIENT)
Dept: LAB | Facility: CLINIC | Age: 51
End: 2021-09-10
Payer: COMMERCIAL

## 2021-09-10 DIAGNOSIS — Z11.59 NEED FOR HEPATITIS C SCREENING TEST: ICD-10-CM

## 2021-09-10 DIAGNOSIS — Z12.5 PROSTATE CANCER SCREENING: ICD-10-CM

## 2021-09-10 DIAGNOSIS — E10.9 TYPE 1 DIABETES MELLITUS WITHOUT COMPLICATION (H): Primary | ICD-10-CM

## 2021-09-10 DIAGNOSIS — E10.9 TYPE I DIABETES MELLITUS, WELL CONTROLLED (H): ICD-10-CM

## 2021-09-10 DIAGNOSIS — Z11.4 SCREENING FOR HIV (HUMAN IMMUNODEFICIENCY VIRUS): ICD-10-CM

## 2021-09-10 LAB
ALBUMIN SERPL-MCNC: 3.7 G/DL (ref 3.4–5)
ALP SERPL-CCNC: 78 U/L (ref 40–150)
ALT SERPL W P-5'-P-CCNC: 47 U/L (ref 0–70)
ANION GAP SERPL CALCULATED.3IONS-SCNC: 2 MMOL/L (ref 3–14)
AST SERPL W P-5'-P-CCNC: 25 U/L (ref 0–45)
BILIRUB SERPL-MCNC: 0.6 MG/DL (ref 0.2–1.3)
BUN SERPL-MCNC: 21 MG/DL (ref 7–30)
CALCIUM SERPL-MCNC: 8.9 MG/DL (ref 8.5–10.1)
CHLORIDE BLD-SCNC: 110 MMOL/L (ref 94–109)
CHOLEST SERPL-MCNC: 134 MG/DL
CO2 SERPL-SCNC: 30 MMOL/L (ref 20–32)
CREAT SERPL-MCNC: 1.08 MG/DL (ref 0.66–1.25)
GFR SERPL CREATININE-BSD FRML MDRD: 79 ML/MIN/1.73M2
GLUCOSE BLD-MCNC: 103 MG/DL (ref 70–99)
HBA1C MFR BLD: 7.6 % (ref 0–5.6)
HCT VFR BLD AUTO: 43.6 % (ref 40–53)
HCV AB SERPL QL IA: NONREACTIVE
HDLC SERPL-MCNC: 47 MG/DL
HIV 1+2 AB+HIV1 P24 AG SERPL QL IA: NONREACTIVE
HOLD SPECIMEN: NORMAL
LDLC SERPL CALC-MCNC: 76 MG/DL
NONHDLC SERPL-MCNC: 87 MG/DL
POTASSIUM BLD-SCNC: 4.3 MMOL/L (ref 3.4–5.3)
PROT SERPL-MCNC: 6.9 G/DL (ref 6.8–8.8)
PSA SERPL-MCNC: 3.54 UG/L (ref 0–4)
SODIUM SERPL-SCNC: 142 MMOL/L (ref 133–144)
TRIGL SERPL-MCNC: 56 MG/DL

## 2021-09-10 PROCEDURE — 80061 LIPID PANEL: CPT | Performed by: INTERNAL MEDICINE

## 2021-09-10 PROCEDURE — 80053 COMPREHEN METABOLIC PANEL: CPT | Performed by: INTERNAL MEDICINE

## 2021-09-10 PROCEDURE — 87389 HIV-1 AG W/HIV-1&-2 AB AG IA: CPT

## 2021-09-10 PROCEDURE — G0103 PSA SCREENING: HCPCS

## 2021-09-10 PROCEDURE — 85014 HEMATOCRIT: CPT | Performed by: INTERNAL MEDICINE

## 2021-09-10 PROCEDURE — 83036 HEMOGLOBIN GLYCOSYLATED A1C: CPT

## 2021-09-10 PROCEDURE — 86803 HEPATITIS C AB TEST: CPT

## 2021-09-10 PROCEDURE — 36415 COLL VENOUS BLD VENIPUNCTURE: CPT

## 2021-09-10 NOTE — TELEPHONE ENCOUNTER
Lab dept contacted and informed of add on labs.    Wes Sullivan [facility] [address]  Adult Endocrinology  [facility]

## 2021-09-25 ENCOUNTER — HEALTH MAINTENANCE LETTER (OUTPATIENT)
Age: 51
End: 2021-09-25

## 2021-11-04 DIAGNOSIS — E10.9 TYPE 1 DIABETES MELLITUS WITHOUT COMPLICATION (H): ICD-10-CM

## 2021-11-04 RX ORDER — INSULIN LISPRO 100 [IU]/ML
INJECTION, SOLUTION INTRAVENOUS; SUBCUTANEOUS
Qty: 30 ML | Refills: 0 | Status: SHIPPED | OUTPATIENT
Start: 2021-11-04 | End: 2021-11-24

## 2021-11-04 NOTE — TELEPHONE ENCOUNTER
HumaLOG KwikPen 100 UNIT/ML Subcutaneous Solution Pen-injector      Last Written Prescription Date:  8-8-21  Last Fill Quantity: 30 ml,   # refills: 0  Last Office Visit : 9-29-20  Future Office visit:  11-24-21    Routing refill request to provider for review/approval because:  Insulin - refilled per clinic

## 2021-11-18 NOTE — PROGRESS NOTES
Outcome for 11/18/21 9:38 AM :Patient is sharing data via clinic device website and BG data up-to-date DEXCOM  Wes Sullivan CMA  Adult Endocrinology  [facility]    Outcome for 11/23/21 4:24 PM : Patient is sharing data via clinic device website, but no data was found. Sent message to patient to bring in Dexcom /sensor to upload at clinic and provided phone number to Dexcom to call and help troubleshoot if any issues or concerns.  Cassie MAGALLANES MA   Adult Endocrine   [facility]

## 2021-11-23 ENCOUNTER — MYC MEDICAL ADVICE (OUTPATIENT)
Dept: ENDOCRINOLOGY | Facility: CLINIC | Age: 51
End: 2021-11-23
Payer: COMMERCIAL

## 2021-11-24 ENCOUNTER — OFFICE VISIT (OUTPATIENT)
Dept: ENDOCRINOLOGY | Facility: CLINIC | Age: 51
End: 2021-11-24
Payer: COMMERCIAL

## 2021-11-24 VITALS
HEART RATE: 71 BPM | WEIGHT: 215.7 LBS | OXYGEN SATURATION: 98 % | DIASTOLIC BLOOD PRESSURE: 82 MMHG | BODY MASS INDEX: 31.17 KG/M2 | SYSTOLIC BLOOD PRESSURE: 127 MMHG

## 2021-11-24 DIAGNOSIS — E78.00 HYPERCHOLESTEREMIA: ICD-10-CM

## 2021-11-24 DIAGNOSIS — E10.9 TYPE 1 DIABETES MELLITUS WITHOUT COMPLICATION (H): Primary | ICD-10-CM

## 2021-11-24 PROCEDURE — 99214 OFFICE O/P EST MOD 30 MIN: CPT | Performed by: INTERNAL MEDICINE

## 2021-11-24 RX ORDER — GLUCAGON 3 MG/1
3 POWDER NASAL DAILY PRN
Qty: 1 EACH | Refills: 3 | Status: SHIPPED | OUTPATIENT
Start: 2021-11-24 | End: 2024-01-17

## 2021-11-24 RX ORDER — INSULIN LISPRO 100 [IU]/ML
INJECTION, SOLUTION INTRAVENOUS; SUBCUTANEOUS
Qty: 30 ML | Refills: 3 | Status: SHIPPED | OUTPATIENT
Start: 2021-11-24 | End: 2022-09-20

## 2021-11-24 RX ORDER — ATORVASTATIN CALCIUM 10 MG/1
5 TABLET, FILM COATED ORAL AT BEDTIME
Qty: 45 TABLET | Refills: 3 | Status: SHIPPED | OUTPATIENT
Start: 2021-11-24 | End: 2022-11-23

## 2021-11-24 RX ORDER — INSULIN GLARGINE 100 [IU]/ML
INJECTION, SOLUTION SUBCUTANEOUS
Qty: 19 ML | Refills: 3 | Status: SHIPPED | OUTPATIENT
Start: 2021-11-24 | End: 2022-04-04

## 2021-11-24 RX ORDER — INSULIN LISPRO 100 [IU]/ML
INJECTION, SOLUTION INTRAVENOUS; SUBCUTANEOUS
Qty: 30 ML | Refills: 0 | OUTPATIENT
Start: 2021-11-24 | End: 2021-11-24

## 2021-11-24 RX ORDER — INSULIN GLARGINE 100 [IU]/ML
INJECTION, SOLUTION SUBCUTANEOUS
Qty: 15 ML | Refills: 1 | OUTPATIENT
Start: 2021-11-24 | End: 2021-11-24

## 2021-11-24 NOTE — PATIENT INSTRUCTIONS
[facility]  Dulce Jose RN, Diabetes Educator: [phone]  Clinic Nurses JASPER Lawrence; CMA's: Cassie Sandhu Yang Mee   Clinic Fax: [phone]  On-Call Endocrine at the [place] (after hours/weekends): [phone] option 4  Scheduling Line: [phone]     Appointment Reminders:  * Please bring meter with for staff to download  * If you are due ONLY for an A1C, it is scheduled with the nurse and will be done in clinic. You do not need to schedule a lab appointment. Fasting is not required for an A1C.  * Refill request should be submitted to your pharmacy. They will contact clinic for approval.

## 2021-11-24 NOTE — TELEPHONE ENCOUNTER
Patient not using Dexcom, currently using meter at this time.    Cassie MAGALLANES MA   Adult Endocrine   [facility]

## 2021-11-24 NOTE — NURSING NOTE
Tl Sands's goals for this visit include:   Chief Complaint   Patient presents with     Diabetes       He requests these members of his care team be copied on today's visit information: yes     PCP: Jakub Wolff    Referring Provider:  No referring provider defined for this encounter.    /82 (BP Location: Left arm, Patient Position: Chair, Cuff Size: Adult Large)   Pulse 71   Wt 97.8 kg (215 lb 11.2 oz)   SpO2 98%   BMI 31.17 kg/m      Do you need any medication refills at today's visit? Yes    Cassie MAGALLANES MA   Adult Endocrine   [facility]

## 2021-11-24 NOTE — LETTER
2021         RE: Tl Sands  [address]  [facility] [id number]        Dear Colleague,    Thank you for referring your patient, Tl Sands, to the [facility]. Please see a copy of my visit note below.    Outcome for 21 9:38 AM :Patient is sharing data via clinic device website and BG data up-to-date DEXCOM  Wes Sullivan CMA  Adult Endocrinology  [facility]    Outcome for 21 4:24 PM : Patient is sharing data via clinic device website, but no data was found. Sent message to patient to bring in Dexcom /sensor to upload at clinic and provided phone number to Dexcom to call and help troubleshoot if any issues or concerns.  Cassie MAGALLANES MA   Adult Endocrine   [facility]          Tl Sands is a 51 year old man seen in follow-up for type 1 diabetes, diagnosed in .      He has not known diabetes complications and his average hemoglobin A1c over the recent years has been around 7 to 7.5.  Most recent A1c was 7.6%, in 2021.     Current insulin regimen is 22 U Lantus in am, 1 U Humalog per 10 grams CHO and a correction scale of 1 U per 25 mg above 120.  In a regular day, he administers 6-8 units Humalog per meal (he has 3 meals a day).  Snacks - no schedule, mainly in the evenings and weekends. Covered with Humalog.   He has not been using the Dexcom sensor. He prefers to use the rely on meter. On the meter, his average glucose 30 days is 165. The blood glucose range is variable from , with no identifiable pattern.    Diabetes complications:  Last eye exam  -  no DR   No h/o proteinuria. Recent GFR 79  No numbness or tingling sensation   He denies prior episodes of loss of consciousness due to hypoglycemia.  The lowest blood glucose he remembers was 40.  Feels weak and jittery when his BG is low. With severe lows he has sweating.   Glucagon .   Exercise: Summertime, he  plays golf. He has been hunting in the fall.  He has only been taking 1/2 tablets of 10 mg atorvastatin daily.   On lab work from 9/10/2021, LDL cholesterol was 76, HDL cholesterol 47, triglycerides 56.    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   Type 1 diabetes   Tinea pedis   Finger fracture   ED  LBP     PSM:  B/L inguinal hernia repair   B/L arthroscopic knee surgery        Current Outpatient Medications:      aspirin (ASA) 81 MG EC tablet, Take 1 tablet (81 mg) by mouth daily, Disp: 90 tablet, Rfl: 3     atorvastatin (LIPITOR) 10 MG tablet, Take 0.5 tablets (5 mg) by mouth At Bedtime, Disp: 90 tablet, Rfl: 3     clobetasol (TEMOVATE) 0.05 % external cream, Apply twice daily up to 1 week for scar, take 1 week off, repeat if needed, Disp: 45 g, Rfl: 0     clotrimazole (LOTRIMIN) 1 % external cream, Apply topically 2 times daily, Disp: 60 g, Rfl: 0     Continuous Blood Gluc Sensor (DEXCOM G6 SENSOR) MISC, , Disp: , Rfl:      Continuous Blood Gluc Transmit (DEXCOM G6 TRANSMITTER) MISC, Change every 3 months., Disp: 1 each, Rfl: 0     econazole nitrate 1 % external cream, Apply topically 2 times daily, Disp: 60 g, Rfl: 3     fluorouracil (EFUDEX) 5 % external cream, Apply twice daily for 3 weeks to temples, Disp: 40 g, Rfl: 0     HUMALOG KWIKPEN 100 UNIT/ML soln, INJECT UP TO 40 UNITS SUBCUTANEOUSLY ONCE DAILY FOR CARB COVERAGE, CORRECTION AND PRIMING AS DIRECTED, Disp: 30 mL, Rfl: 0     insulin glargine (LANTUS SOLOSTAR) 100 UNIT/ML pen, INJECT 21 UNITS SUBCUTANEOUSLY ONCE DAILY, Disp: 15 mL, Rfl: 1     insulin pen needle (BD PEN NEEDLE MICRO U/F) 32G X 6 MM miscellaneous, Use 4 times daily or as directed., Disp: 400 each, Rfl: 2     meloxicam (MOBIC) 15 MG tablet, Take 1 tablet (15 mg) by mouth daily as needed for moderate pain, Disp: 90 tablet, Rfl: 0     mometasone (ELOCON) 0.1 % external solution, Apply small amount to rash on scalp up to once daily as needed for up to 1 week, Disp: 60 mL, Rfl:  11     omeprazole (PRILOSEC) 40 MG DR capsule, Take 1 capsule (40 mg) by mouth daily, Disp: 90 capsule, Rfl: 3     tadalafil (CIALIS) 20 MG tablet, Take 1 tablet by mouth. As needed, Disp: 20 tablet, Rfl: prn  No current facility-administered medications for this visit.    Facility-Administered Medications Ordered in Other Visits:      bupivacaine (MARCAINE) injection 0.5% (PF), 2 mL, INTRAPLEURAL, Once, Thai Mims DO     methylPREDNISolone (DEPO-MEDROL) injection 80 mg, 80 mg, Intramuscular, Once, Thai Mims DO    HABITS:  He does not use tobacco or alcohol.      SOCIAL HISTORY:  He is  and lives with his wife and children in [place]. 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      Family history  Maternal grandfather - type 1 diabetes. Paternal uncle also has type 1 diabetes. No f/h of thyroid disease. Paternal uncle - colon cancer.    PHYSICAL EXAMINATION:   Wt Readings from Last 10 Encounters:   11/24/21 97.8 kg (215 lb 11.2 oz)   08/17/21 97.6 kg (215 lb 3.2 oz)   01/06/21 101.3 kg (223 lb 4 oz)   09/15/20 95.3 kg (210 lb)   01/09/20 99.3 kg (219 lb)   12/26/19 99.6 kg (219 lb 9.6 oz)   11/22/19 99.2 kg (218 lb 12.8 oz)   08/28/19 96.3 kg (212 lb 4.8 oz)   04/15/19 95.3 kg (210 lb)   12/10/18 (P) 95.3 kg (210 lb)     BP Readings from Last 6 Encounters:   11/24/21 127/82   08/17/21 138/78   01/06/21 (!) 140/90   08/06/20 137/84   02/06/20 131/88   01/09/20 107/71     /82 (BP Location: Left arm, Patient Position: Chair, Cuff Size: Adult Large)   Pulse 71   Wt 97.8 kg (215 lb 11.2 oz)   SpO2 98%   BMI 31.17 kg/m      General appearance: he is well-developed, well-nourished, and in no distress.  Eyes: conjunctivae and extraocular motions are normal. Pupils are equal, round, and reactive to light. No lid lag, no stare.  HENT: oropharynx clear and moist; neck no JVD, no bruits, no thyromegaly, no palpable nodules  Cardiovascular: regular rhythm, no murmurs, distal pulses palpable,  no edema  Respiratory: chest clear, no rales, no rhonchi  Gastrointestinal: abdomen soft, nontender, nondistended, +BS, no organomegaly  Musculoskeletal: normal tone and strength   Neurologic: reflexes normal and symmetric, no resting tremor  Psychiatric: affect and judgment normal   Skin: Mild dystrophy at the site of prior abdominal insulin injections  Feet: sensation intact to monofilament testing    LABORATORY TESTS:   I reviewed prior lab results documented in Epic.   Lab Results   Component Value Date    A1C 7.6 (H) 09/10/2021    A1C 6.7 (H) 09/29/2020    A1C 8.0 (A) 08/28/2019    A1C 7.2 08/29/2018    A1C 7.1 (H) 07/26/2017    A1C 7.6 09/13/2016       Hemoglobin   Date Value Ref Range Status   11/22/2019 15.1 13.3 - 17.7 g/dL Final     Hematocrit   Date Value Ref Range Status   09/10/2021 43.6 40.0 - 53.0 % Final   09/29/2020 42.4 40.0 - 53.0 % Final     Cholesterol   Date Value Ref Range Status   09/10/2021 134 <200 mg/dL Final     Comment:     Age 0-19 years  Desirable: <170 mg/dL  Borderline high:  170-199 mg/dl  High:            >199 mg/dl    Age 20 years and older  Desirable: <200 mg/dL   09/29/2020 141 <200 mg/dL Final     Cholesterol/HDL Ratio   Date Value Ref Range Status   03/17/2015 3.2 0.0 - 5.0 Final     HDL Cholesterol   Date Value Ref Range Status   09/29/2020 54 >39 mg/dL Final     Direct Measure HDL   Date Value Ref Range Status   09/10/2021 47 >=40 mg/dL Final     Comment:     0-19 years:       Greater than or equal to 45 mg/dL   Low: Less than 40 mg/dL   Borderline low: 40-44 mg/dL     20 years and older:   Female: Greater than or equal to 50 mg/dL   Male:   Greater than or equal to 40 mg/dL          LDL Cholesterol Calculated   Date Value Ref Range Status   09/10/2021 76 <=100 mg/dL Final     Comment:     Age 0-19 years:  Desirable: 0-110 mg/dL   Borderline high: 110-129 mg/dL   High: >= 130 mg/dL    Age 20 years and older:  Desirable: <100mg/dL  Above desirable: 100-129 mg/dL   Borderline  high: 130-159 mg/dL   High: 160-189 mg/dL   Very high: >= 190 mg/dL   09/29/2020 69 <100 mg/dL Final     Comment:     Desirable:       <100 mg/dl     VLDL-Cholesterol   Date Value Ref Range Status   03/17/2015 23 0 - 30 mg/dL Final     Triglycerides   Date Value Ref Range Status   09/10/2021 56 <150 mg/dL Final     Comment:     0-9 years:  Normal:    Less than 75 mg/dL  Borderline high:  75-99 mg/dL  High:             Greater than or equal to 100 mg/dL    0-19 years:  Normal:    Less than 90 mg/dL  Borderline high:   mg/dL  High:             Greater than or equal to 130 mg/dL    20 years and older:  Normal:    Less than 150 mg/dL  Borderline high:  150-199 mg/dL  High:             200-499 mg/dL  Very high:   Greater than or equal to 500 mg/dL   09/29/2020 91 <150 mg/dL Final     Comment:     Non Fasting     Albumin Urine mg/L   Date Value Ref Range Status   09/29/2020 10 mg/L Final     TSH   Date Value Ref Range Status   07/26/2017 1.37 0.40 - 4.00 mU/L Final         Last Basic Metabolic Panel:    Sodium   Date Value Ref Range Status   09/10/2021 142 133 - 144 mmol/L Final   09/29/2020 139 133 - 144 mmol/L Final     Potassium   Date Value Ref Range Status   09/10/2021 4.3 3.4 - 5.3 mmol/L Final   09/29/2020 4.2 3.4 - 5.3 mmol/L Final     Chloride   Date Value Ref Range Status   09/10/2021 110 (H) 94 - 109 mmol/L Final   09/29/2020 107 94 - 109 mmol/L Final     Calcium   Date Value Ref Range Status   09/10/2021 8.9 8.5 - 10.1 mg/dL Final   09/29/2020 8.4 (L) 8.5 - 10.1 mg/dL Final     Carbon Dioxide   Date Value Ref Range Status   09/29/2020 29 20 - 32 mmol/L Final     Carbon Dioxide (CO2)   Date Value Ref Range Status   09/10/2021 30 20 - 32 mmol/L Final     Urea Nitrogen   Date Value Ref Range Status   09/10/2021 21 7 - 30 mg/dL Final   09/29/2020 21 7 - 30 mg/dL Final     Creatinine   Date Value Ref Range Status   09/10/2021 1.08 0.66 - 1.25 mg/dL Final   09/29/2020 0.81 0.66 - 1.25 mg/dL Final     GFR  Estimate   Date Value Ref Range Status   09/10/2021 79 >60 mL/min/1.73m2 Final     Comment:     As of July 11, 2021, eGFR is calculated by the CKD-EPI creatinine equation, without race adjustment. eGFR can be influenced by muscle mass, exercise, and diet. The reported eGFR is an estimation only and is only applicable if the renal function is stable.   09/29/2020 >90 >60 mL/min/[1.73_m2] Final     Comment:     Non  GFR Calc  Starting 12/18/2018, serum creatinine based estimated GFR (eGFR) will be   calculated using the Chronic Kidney Disease Epidemiology Collaboration   (CKD-EPI) equation.       Glucose   Date Value Ref Range Status   09/10/2021 103 (H) 70 - 99 mg/dL Final   09/29/2020 226 (H) 70 - 99 mg/dL Final     Comment:     Non Fasting       AST   Date Value Ref Range Status   09/10/2021 25 0 - 45 U/L Final   09/29/2020 18 0 - 45 U/L Final     ALT   Date Value Ref Range Status   09/10/2021 47 0 - 70 U/L Final   09/29/2020 31 0 - 70 U/L Final     Albumin   Date Value Ref Range Status   09/10/2021 3.7 3.4 - 5.0 g/dL Final   09/29/2020 3.6 3.4 - 5.0 g/dL Final       AST   Date Value Ref Range Status   09/10/2021 25 0 - 45 U/L Final   09/29/2020 18 0 - 45 U/L Final     ALT   Date Value Ref Range Status   09/10/2021 47 0 - 70 U/L Final   09/29/2020 31 0 - 70 U/L Final     Albumin   Date Value Ref Range Status   09/10/2021 3.7 3.4 - 5.0 g/dL Final   09/29/2020 3.6 3.4 - 5.0 g/dL Final       Assessment and plan:    1.  Type 1 diabetes mellitus, suboptimally controlled, not known to be complicated by microvascular disease. 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  Long-term, I think he is going to benefit from the use of the sensor or of an insulin pump.  I strongly encouraged the patient to try to use the Dexcom sensor. I am unable to make insulin changes without knowing the carbohydrate intake and the insulin dose administered. Discussed about the importance of taking NovoLog before eating,  administering insulin and new areas, in order to avoid poor insulin absorption and subsequent hyperglycemia.  Follow-up A1c in 1 month.    2. Hypercholesterolemia, controlled     Orders Placed This Encounter   Procedures     Hemoglobin A1c        32 minutes spent on the date of the encounter doing chart review, history and exam, documentation and further activities as noted above.                   Again, thank you for allowing me to participate in the care of your patient.        Sincerely,        Rupali Resendiz MD

## 2021-11-24 NOTE — PROGRESS NOTES
Tl Sands is a 51 year old man seen in follow-up for type 1 diabetes, diagnosed in .      He has not known diabetes complications and his average hemoglobin A1c over the recent years has been around 7 to 7.5.  Most recent A1c was 7.6%, in 2021.     Current insulin regimen is 22 U Lantus in am, 1 U Humalog per 10 grams CHO and a correction scale of 1 U per 25 mg above 120.  In a regular day, he administers 6-8 units Humalog per meal (he has 3 meals a day).  Snacks - no schedule, mainly in the evenings and weekends. Covered with Humalog.   He has not been using the Dexcom sensor. He prefers to use the rely on meter. On the meter, his average glucose 30 days is 165. The blood glucose range is variable from , with no identifiable pattern.    Diabetes complications:  Last eye exam 2022 -  no DR Goodwin h/o proteinuria. Recent GFR 79  No numbness or tingling sensation   He denies prior episodes of loss of consciousness due to hypoglycemia.  The lowest blood glucose he remembers was 40.  Feels weak and jittery when his BG is low. With severe lows he has sweating.   Glucagon .   Exercise: Summertime, he plays golf. He has been hunting in the fall.  He has only been taking 1/2 tablets of 10 mg atorvastatin daily.   On lab work from 9/10/2021, LDL cholesterol was 76, HDL cholesterol 47, triglycerides 56.    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   Type 1 diabetes   Tinea pedis   Finger fracture   ED  LBP     PSM:  B/L inguinal hernia repair   B/L arthroscopic knee surgery        Current Outpatient Medications:      aspirin (ASA) 81 MG EC tablet, Take 1 tablet (81 mg) by mouth daily, Disp: 90 tablet, Rfl: 3     atorvastatin (LIPITOR) 10 MG tablet, Take 0.5 tablets (5 mg) by mouth At Bedtime, Disp: 90 tablet, Rfl: 3     clobetasol (TEMOVATE) 0.05 % external cream, Apply twice daily up to 1 week for scar, take 1 week off, repeat if needed, Disp: 45 g, Rfl: 0     clotrimazole  (LOTRIMIN) 1 % external cream, Apply topically 2 times daily, Disp: 60 g, Rfl: 0     Continuous Blood Gluc Sensor (DEXCOM G6 SENSOR) MISC, , Disp: , Rfl:      Continuous Blood Gluc Transmit (DEXCOM G6 TRANSMITTER) MISC, Change every 3 months., Disp: 1 each, Rfl: 0     econazole nitrate 1 % external cream, Apply topically 2 times daily, Disp: 60 g, Rfl: 3     fluorouracil (EFUDEX) 5 % external cream, Apply twice daily for 3 weeks to temples, Disp: 40 g, Rfl: 0     HUMALOG KWIKPEN 100 UNIT/ML soln, INJECT UP TO 40 UNITS SUBCUTANEOUSLY ONCE DAILY FOR CARB COVERAGE, CORRECTION AND PRIMING AS DIRECTED, Disp: 30 mL, Rfl: 0     insulin glargine (LANTUS SOLOSTAR) 100 UNIT/ML pen, INJECT 21 UNITS SUBCUTANEOUSLY ONCE DAILY, Disp: 15 mL, Rfl: 1     insulin pen needle (BD PEN NEEDLE MICRO U/F) 32G X 6 MM miscellaneous, Use 4 times daily or as directed., Disp: 400 each, Rfl: 2     meloxicam (MOBIC) 15 MG tablet, Take 1 tablet (15 mg) by mouth daily as needed for moderate pain, Disp: 90 tablet, Rfl: 0     mometasone (ELOCON) 0.1 % external solution, Apply small amount to rash on scalp up to once daily as needed for up to 1 week, Disp: 60 mL, Rfl: 11     omeprazole (PRILOSEC) 40 MG DR capsule, Take 1 capsule (40 mg) by mouth daily, Disp: 90 capsule, Rfl: 3     tadalafil (CIALIS) 20 MG tablet, Take 1 tablet by mouth. As needed, Disp: 20 tablet, Rfl: prn  No current facility-administered medications for this visit.    Facility-Administered Medications Ordered in Other Visits:      bupivacaine (MARCAINE) injection 0.5% (PF), 2 mL, INTRAPLEURAL, Once, Thai Mims DO     methylPREDNISolone (DEPO-MEDROL) injection 80 mg, 80 mg, Intramuscular, Once, Thai Mims DO    HABITS:  He does not use tobacco or alcohol.      SOCIAL HISTORY:  He is  and lives with his wife and children in [place]. 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      Family history  Maternal grandfather - type 1 diabetes. Paternal uncle also has  type 1 diabetes. No f/h of thyroid disease. Paternal uncle - colon cancer.    PHYSICAL EXAMINATION:   Wt Readings from Last 10 Encounters:   11/24/21 97.8 kg (215 lb 11.2 oz)   08/17/21 97.6 kg (215 lb 3.2 oz)   01/06/21 101.3 kg (223 lb 4 oz)   09/15/20 95.3 kg (210 lb)   01/09/20 99.3 kg (219 lb)   12/26/19 99.6 kg (219 lb 9.6 oz)   11/22/19 99.2 kg (218 lb 12.8 oz)   08/28/19 96.3 kg (212 lb 4.8 oz)   04/15/19 95.3 kg (210 lb)   12/10/18 (P) 95.3 kg (210 lb)     BP Readings from Last 6 Encounters:   11/24/21 127/82   08/17/21 138/78   01/06/21 (!) 140/90   08/06/20 137/84   02/06/20 131/88   01/09/20 107/71     /82 (BP Location: Left arm, Patient Position: Chair, Cuff Size: Adult Large)   Pulse 71   Wt 97.8 kg (215 lb 11.2 oz)   SpO2 98%   BMI 31.17 kg/m      General appearance: he is well-developed, well-nourished, and in no distress.  Eyes: conjunctivae and extraocular motions are normal. Pupils are equal, round, and reactive to light. No lid lag, no stare.  HENT: oropharynx clear and moist; neck no JVD, no bruits, no thyromegaly, no palpable nodules  Cardiovascular: regular rhythm, no murmurs, distal pulses palpable, no edema  Respiratory: chest clear, no rales, no rhonchi  Gastrointestinal: abdomen soft, nontender, nondistended, +BS, no organomegaly  Musculoskeletal: normal tone and strength   Neurologic: reflexes normal and symmetric, no resting tremor  Psychiatric: affect and judgment normal   Skin: Mild dystrophy at the site of prior abdominal insulin injections  Feet: sensation intact to monofilament testing    LABORATORY TESTS:   I reviewed prior lab results documented in Epic.   Lab Results   Component Value Date    A1C 7.6 (H) 09/10/2021    A1C 6.7 (H) 09/29/2020    A1C 8.0 (A) 08/28/2019    A1C 7.2 08/29/2018    A1C 7.1 (H) 07/26/2017    A1C 7.6 09/13/2016       Hemoglobin   Date Value Ref Range Status   11/22/2019 15.1 13.3 - 17.7 g/dL Final     Hematocrit   Date Value Ref Range Status    09/10/2021 43.6 40.0 - 53.0 % Final   09/29/2020 42.4 40.0 - 53.0 % Final     Cholesterol   Date Value Ref Range Status   09/10/2021 134 <200 mg/dL Final     Comment:     Age 0-19 years  Desirable: <170 mg/dL  Borderline high:  170-199 mg/dl  High:            >199 mg/dl    Age 20 years and older  Desirable: <200 mg/dL   09/29/2020 141 <200 mg/dL Final     Cholesterol/HDL Ratio   Date Value Ref Range Status   03/17/2015 3.2 0.0 - 5.0 Final     HDL Cholesterol   Date Value Ref Range Status   09/29/2020 54 >39 mg/dL Final     Direct Measure HDL   Date Value Ref Range Status   09/10/2021 47 >=40 mg/dL Final     Comment:     0-19 years:       Greater than or equal to 45 mg/dL   Low: Less than 40 mg/dL   Borderline low: 40-44 mg/dL     20 years and older:   Female: Greater than or equal to 50 mg/dL   Male:   Greater than or equal to 40 mg/dL          LDL Cholesterol Calculated   Date Value Ref Range Status   09/10/2021 76 <=100 mg/dL Final     Comment:     Age 0-19 years:  Desirable: 0-110 mg/dL   Borderline high: 110-129 mg/dL   High: >= 130 mg/dL    Age 20 years and older:  Desirable: <100mg/dL  Above desirable: 100-129 mg/dL   Borderline high: 130-159 mg/dL   High: 160-189 mg/dL   Very high: >= 190 mg/dL   09/29/2020 69 <100 mg/dL Final     Comment:     Desirable:       <100 mg/dl     VLDL-Cholesterol   Date Value Ref Range Status   03/17/2015 23 0 - 30 mg/dL Final     Triglycerides   Date Value Ref Range Status   09/10/2021 56 <150 mg/dL Final     Comment:     0-9 years:  Normal:    Less than 75 mg/dL  Borderline high:  75-99 mg/dL  High:             Greater than or equal to 100 mg/dL    0-19 years:  Normal:    Less than 90 mg/dL  Borderline high:   mg/dL  High:             Greater than or equal to 130 mg/dL    20 years and older:  Normal:    Less than 150 mg/dL  Borderline high:  150-199 mg/dL  High:             200-499 mg/dL  Very high:   Greater than or equal to 500 mg/dL   09/29/2020 91 <150 mg/dL Final      Comment:     Non Fasting     Albumin Urine mg/L   Date Value Ref Range Status   09/29/2020 10 mg/L Final     TSH   Date Value Ref Range Status   07/26/2017 1.37 0.40 - 4.00 mU/L Final         Last Basic Metabolic Panel:    Sodium   Date Value Ref Range Status   09/10/2021 142 133 - 144 mmol/L Final   09/29/2020 139 133 - 144 mmol/L Final     Potassium   Date Value Ref Range Status   09/10/2021 4.3 3.4 - 5.3 mmol/L Final   09/29/2020 4.2 3.4 - 5.3 mmol/L Final     Chloride   Date Value Ref Range Status   09/10/2021 110 (H) 94 - 109 mmol/L Final   09/29/2020 107 94 - 109 mmol/L Final     Calcium   Date Value Ref Range Status   09/10/2021 8.9 8.5 - 10.1 mg/dL Final   09/29/2020 8.4 (L) 8.5 - 10.1 mg/dL Final     Carbon Dioxide   Date Value Ref Range Status   09/29/2020 29 20 - 32 mmol/L Final     Carbon Dioxide (CO2)   Date Value Ref Range Status   09/10/2021 30 20 - 32 mmol/L Final     Urea Nitrogen   Date Value Ref Range Status   09/10/2021 21 7 - 30 mg/dL Final   09/29/2020 21 7 - 30 mg/dL Final     Creatinine   Date Value Ref Range Status   09/10/2021 1.08 0.66 - 1.25 mg/dL Final   09/29/2020 0.81 0.66 - 1.25 mg/dL Final     GFR Estimate   Date Value Ref Range Status   09/10/2021 79 >60 mL/min/1.73m2 Final     Comment:     As of July 11, 2021, eGFR is calculated by the CKD-EPI creatinine equation, without race adjustment. eGFR can be influenced by muscle mass, exercise, and diet. The reported eGFR is an estimation only and is only applicable if the renal function is stable.   09/29/2020 >90 >60 mL/min/[1.73_m2] Final     Comment:     Non  GFR Calc  Starting 12/18/2018, serum creatinine based estimated GFR (eGFR) will be   calculated using the Chronic Kidney Disease Epidemiology Collaboration   (CKD-EPI) equation.       Glucose   Date Value Ref Range Status   09/10/2021 103 (H) 70 - 99 mg/dL Final   09/29/2020 226 (H) 70 - 99 mg/dL Final     Comment:     Non Fasting       AST   Date Value Ref Range  Status   09/10/2021 25 0 - 45 U/L Final   09/29/2020 18 0 - 45 U/L Final     ALT   Date Value Ref Range Status   09/10/2021 47 0 - 70 U/L Final   09/29/2020 31 0 - 70 U/L Final     Albumin   Date Value Ref Range Status   09/10/2021 3.7 3.4 - 5.0 g/dL Final   09/29/2020 3.6 3.4 - 5.0 g/dL Final       AST   Date Value Ref Range Status   09/10/2021 25 0 - 45 U/L Final   09/29/2020 18 0 - 45 U/L Final     ALT   Date Value Ref Range Status   09/10/2021 47 0 - 70 U/L Final   09/29/2020 31 0 - 70 U/L Final     Albumin   Date Value Ref Range Status   09/10/2021 3.7 3.4 - 5.0 g/dL Final   09/29/2020 3.6 3.4 - 5.0 g/dL Final       Assessment and plan:    1.  Type 1 diabetes mellitus, suboptimally controlled, not known to be complicated by microvascular disease. 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  Long-term, I think he is going to benefit from the use of the sensor or of an insulin pump.  I strongly encouraged the patient to try to use the Dexcom sensor. I am unable to make insulin changes without knowing the carbohydrate intake and the insulin dose administered. Discussed about the importance of taking NovoLog before eating, administering insulin and new areas, in order to avoid poor insulin absorption and subsequent hyperglycemia.  Follow-up A1c in 1 month.    2. Hypercholesterolemia, controlled     Orders Placed This Encounter   Procedures     Hemoglobin A1c        32 minutes spent on the date of the encounter doing chart review, history and exam, documentation and further activities as noted above.

## 2022-02-01 ENCOUNTER — LAB (OUTPATIENT)
Dept: LAB | Facility: CLINIC | Age: 52
End: 2022-02-01
Payer: COMMERCIAL

## 2022-02-01 DIAGNOSIS — E10.9 TYPE 1 DIABETES MELLITUS WITHOUT COMPLICATION (H): ICD-10-CM

## 2022-02-01 LAB — HBA1C MFR BLD: 7.8 % (ref 0–5.6)

## 2022-02-01 PROCEDURE — 83036 HEMOGLOBIN GLYCOSYLATED A1C: CPT

## 2022-02-01 PROCEDURE — 36415 COLL VENOUS BLD VENIPUNCTURE: CPT

## 2022-05-06 ENCOUNTER — TRANSFERRED RECORDS (OUTPATIENT)
Dept: HEALTH INFORMATION MANAGEMENT | Facility: CLINIC | Age: 52
End: 2022-05-06
Payer: COMMERCIAL

## 2022-05-06 LAB — RETINOPATHY: NEGATIVE

## 2022-07-20 DIAGNOSIS — E10.9 TYPE I DIABETES MELLITUS, WELL CONTROLLED (H): ICD-10-CM

## 2022-07-22 RX ORDER — PROCHLORPERAZINE 25 MG/1
SUPPOSITORY RECTAL
Qty: 1 EACH | Refills: 1 | Status: SHIPPED | OUTPATIENT
Start: 2022-07-22 | End: 2023-02-02

## 2022-07-22 RX ORDER — PROCHLORPERAZINE 25 MG/1
SUPPOSITORY RECTAL
Qty: 90 EACH | Refills: 1 | OUTPATIENT
Start: 2022-07-22 | End: 2022-07-22

## 2022-07-25 RX ORDER — PROCHLORPERAZINE 25 MG/1
SUPPOSITORY RECTAL
Qty: 9 EACH | Refills: 1 | Status: SHIPPED | OUTPATIENT
Start: 2022-07-25 | End: 2023-02-02

## 2022-08-12 ENCOUNTER — NURSE TRIAGE (OUTPATIENT)
Dept: NURSING | Facility: CLINIC | Age: 52
End: 2022-08-12

## 2022-08-12 NOTE — TELEPHONE ENCOUNTER
Caller is on day  4 of Covid; reporting fever and coughing up blood in sputum; temp is 101.7 and has chills;   Is DM I   Not previously vaccinated  For Covid   Not on antivirals   Not anticoagulated   Triage protocol [place]   @  with Dr. Montez   advised to go to ED at  [facility]   Caller will comply   Andreia Brewer RN  FNA   COVID 19 Nurse Triage Plan/Patient Instructions    Please be aware that novel coronavirus (COVID-19) may be circulating in the community. If you develop symptoms such as fever, cough, or SOB or if you have concerns about the presence of another infection including coronavirus (COVID-19), please contact your health care provider or visit [email or web address].     Disposition/Instructions    ED Visit recommended. Follow protocol based instructions.     Bring Your Own Device:  Please also bring your smart device(s) (smart phones, tablets, laptops) and their charging cables for your personal use and to communicate with your care team during your visit.    Thank you for taking steps to prevent the spread of this virus.  o Limit your contact with others.  o Wear a simple mask to cover your cough.  o Wash your hands well and often.    Resources    [facility]: About COVID-19: [email or web address]    CDC: What to Do If You're Sick: www.cdc.gov/coronavirus/2019-ncov/about/steps-when-sick.html    CDC: Ending Home Isolation: www.cdc.gov/coronavirus/2019-ncov/hcp/disposition-in-home-patients.html     CDC: Caring for Someone: www.cdc.gov/coronavirus/2019-ncov/if-you-are-sick/care-for-someone.html     [facility]: Interim Guidance for Hospital Discharge to Home: www.health.state.[place]/diseases/coronavirus/hcp/hospdischarge.pdf    [facility] clinical trials (COVID-19 research studies): [email or web address]     Below are the COVID-19 hotlines at the [facility] ([facility]). Interpreters are available.   o For health questions: Call  [phone] or [phone] (7 a.m. to 7 p.m.)  o For questions about schools and childcare: Call [phone] or [phone] (7 a.m. to 7 p.m.)                       Reason for Disposition    [1] Fever > 100.0 F (37.8 C) AND [2] diabetes mellitus or weak immune system (e.g., HIV positive, cancer chemo, splenectomy, organ transplant, chronic steroids)    Additional Information    Negative: SEVERE difficulty breathing (e.g., struggling for each breath, speaks in single words)    Negative: Difficult to awaken or acting confused (e.g., disoriented, slurred speech)    Negative: Bluish (or gray) lips or face now    Negative: Shock suspected (e.g., cold/pale/clammy skin, too weak to stand, low BP, rapid pulse)    Negative: Sounds like a life-threatening emergency to the triager    Negative: [1] Diagnosed or suspected COVID-19 AND [2] symptoms lasting 3 or more weeks    Negative: [1] COVID-19 exposure AND [2] no symptoms    Negative: COVID-19 vaccine reaction suspected (e.g., fever, headache, muscle aches) occurring 1 to 3 days after getting vaccine    Negative: COVID-19 vaccine, questions about    Negative: [1] Lives with someone known to have influenza (flu test positive) AND [2] flu-like symptoms (e.g., cough, runny nose, sore throat, SOB; with or without fever)    Negative: [1] Adult with possible COVID-19 symptoms AND [2] triager concerned about severity of symptoms or other causes    Negative: COVID-19 and breastfeeding, questions about    Negative: SEVERE or constant chest pain or pressure  (Exception: Mild central chest pain, present only when coughing.)    Negative: MODERATE difficulty breathing (e.g., speaks in phrases, SOB even at rest, pulse 100-120)    Negative: [1] Headache AND [2] stiff neck (can't touch chin to chest)    Negative: Oxygen level (e.g., pulse oximetry) 90 percent or lower    Negative: Chest pain or pressure    Negative: Patient sounds very sick or weak to the triager    Negative: MILD  "difficulty breathing (e.g., minimal/no SOB at rest, SOB with walking, pulse <100)    Negative: Fever > 103 F (39.4 C)    Negative: [1] Fever > 101 F (38.3 C) AND [2] age > 60 years    Negative: [1] Fever > 100.0 F (37.8 C) AND [2] bedridden (e.g., nursing home patient, CVA, chronic illness, recovering from surgery)    Negative: HIGH RISK for severe COVID complications (e.g., weak immune system, age > 64 years, obesity with BMI > 25, pregnant, chronic lung disease or other chronic medical condition)  (Exception: Already seen by PCP and no new or worsening symptoms.)    Negative: [1] HIGH RISK patient AND [2] influenza is widespread in the community AND [3] ONE OR MORE respiratory symptoms: cough, sore throat, runny or stuffy nose    Negative: [1] HIGH RISK patient AND [2] influenza exposure within the last 7 days AND [3] ONE OR MORE respiratory symptoms: cough, sore throat, runny or stuffy nose    Negative: Oxygen level (e.g., pulse oximetry) 91 to 94 percent    Negative: SEVERE difficulty breathing (e.g., struggling for each breath, speaks in single words)    Negative: [1] Chest pain AND [2] difficulty breathing    Negative: Bluish (or gray) lips or face now    Negative: Passed out (i.e., lost consciousness, collapsed and was not responding)    Negative: Shock suspected (e.g., cold/pale/clammy skin, too weak to stand, low BP, rapid pulse)    Negative: Difficult to awaken or acting confused (e.g., disoriented, slurred speech)    Negative: Recent chest injury (i.e., past 24 hours)    Negative: [1] Coughed up blood AND [2] large amount (example: \"a cup of blood\")    Negative: Sounds like a life-threatening emergency to the triager    Negative: [1] MODERATE difficulty breathing (e.g., speaks in phrases, SOB even at rest, pulse 100-120) AND [2] still present when not coughing    Negative: Chest pain    Negative: Unclear to triager if the patient is coughing up blood or vomiting blood    Negative: History of prior \"blood " "clot\" in leg or lungs (i.e., deep vein thrombosis, pulmonary embolism)    Negative: History of inherited increased risk of blood clots (e.g., Factor 5 Leiden, Anti-thrombin 3, Protein C or Protein S deficiency, Prothrombin mutation)    Negative: Pregnant or pregnant in past month    Negative: Hip or leg fracture (broken bone) in past month (or had cast on leg or ankle in past month)    Negative: Long-distance travel in past month (e.g., car, bus, train, plane; with trip lasting 6 or more hours)    Negative: Bedridden (e.g., nursing home patient, CVA, chronic illness, recovering from surgery)    Negative: Patient sounds very sick or weak to the triager    Negative: [1] MILD difficulty breathing (e.g., minimal/no SOB at rest, SOB with walking, pulse <100) AND [2] still present when not coughing (Exception: no change from usual, chronic shortness of breath)    Negative: [1] Coughed up blood AND [2] > 1 tablespoon (15 ml)  (Exception: Blood-tinged sputum.)    Negative: Fever > 103 F (39.4 C)    Negative: [1] Fever > 101 F (38.3 C) AND [2] age > 60 years    Protocols used: COUGHING UP BLOOD-A-AH, CORONAVIRUS (COVID-19) DIAGNOSED OR [id number] 1.18.2022      "

## 2022-08-25 ENCOUNTER — TELEPHONE (OUTPATIENT)
Dept: DERMATOLOGY | Facility: CLINIC | Age: 52
End: 2022-08-25

## 2022-08-25 NOTE — TELEPHONE ENCOUNTER
Called [facility] and denied the refill request as pt needs to schedule an appointment for further refills

## 2022-08-25 NOTE — TELEPHONE ENCOUNTER
Patient is requesting a 60 quantity refill of the Mometasone 0.1% Solution. [facility]  [phone]. Previous [place] pt. Thank you!    Narciso Taylor  In Clinic Visit Facilitator

## 2022-08-26 DIAGNOSIS — N50.89 TESTICLE LUMP: ICD-10-CM

## 2022-08-26 RX ORDER — CLOTRIMAZOLE 1 %
CREAM (GRAM) TOPICAL 2 TIMES DAILY
Qty: 60 G | Refills: 0 | Status: SHIPPED | OUTPATIENT
Start: 2022-08-26 | End: 2023-06-28

## 2022-08-27 ENCOUNTER — HEALTH MAINTENANCE LETTER (OUTPATIENT)
Age: 52
End: 2022-08-27

## 2022-09-19 DIAGNOSIS — E10.9 TYPE 1 DIABETES MELLITUS WITHOUT COMPLICATION (H): ICD-10-CM

## 2022-09-22 RX ORDER — PEN NEEDLE, DIABETIC 32 GX 1/4"
NEEDLE, DISPOSABLE MISCELLANEOUS
Qty: 400 EACH | Refills: 0 | Status: SHIPPED | OUTPATIENT
Start: 2022-09-22 | End: 2022-11-23

## 2022-09-22 NOTE — TELEPHONE ENCOUNTER
Last Clinic Visit: Endocrinology, Diabetes, and Metabolism  11/24/2021  [facility] [place]  Recommended 1 year follow up  [place] 11/23/22

## 2022-11-18 ENCOUNTER — MYC MEDICAL ADVICE (OUTPATIENT)
Dept: ENDOCRINOLOGY | Facility: CLINIC | Age: 52
End: 2022-11-18

## 2022-11-18 DIAGNOSIS — E10.9 TYPE 1 DIABETES MELLITUS WITHOUT COMPLICATION (H): Primary | ICD-10-CM

## 2022-11-18 NOTE — PROGRESS NOTES
Outcome for 11/18/22 12:11 PM: [employer] message sent requesting blood sugar readings.  Phoebe Lira CMA  Adult Endocrinology  [facility]

## 2022-11-23 ENCOUNTER — LAB (OUTPATIENT)
Dept: LAB | Facility: CLINIC | Age: 52
End: 2022-11-23
Payer: COMMERCIAL

## 2022-11-23 ENCOUNTER — OFFICE VISIT (OUTPATIENT)
Dept: ENDOCRINOLOGY | Facility: CLINIC | Age: 52
End: 2022-11-23
Payer: COMMERCIAL

## 2022-11-23 VITALS
DIASTOLIC BLOOD PRESSURE: 77 MMHG | HEART RATE: 73 BPM | OXYGEN SATURATION: 99 % | WEIGHT: 210 LBS | BODY MASS INDEX: 30.35 KG/M2 | SYSTOLIC BLOOD PRESSURE: 126 MMHG

## 2022-11-23 DIAGNOSIS — E78.00 HYPERCHOLESTEREMIA: ICD-10-CM

## 2022-11-23 DIAGNOSIS — R05.3 CHRONIC COUGH: ICD-10-CM

## 2022-11-23 DIAGNOSIS — E10.9 TYPE 1 DIABETES MELLITUS WITHOUT COMPLICATION (H): ICD-10-CM

## 2022-11-23 DIAGNOSIS — N52.9 ERECTILE DYSFUNCTION, UNSPECIFIED ERECTILE DYSFUNCTION TYPE: ICD-10-CM

## 2022-11-23 DIAGNOSIS — K21.00 GASTROESOPHAGEAL REFLUX DISEASE WITH ESOPHAGITIS WITHOUT HEMORRHAGE: ICD-10-CM

## 2022-11-23 DIAGNOSIS — E10.9 TYPE 1 DIABETES MELLITUS WITHOUT COMPLICATION (H): Primary | ICD-10-CM

## 2022-11-23 LAB
ALBUMIN SERPL-MCNC: 3.8 G/DL (ref 3.4–5)
ALP SERPL-CCNC: 79 U/L (ref 40–150)
ALT SERPL W P-5'-P-CCNC: 31 U/L (ref 0–70)
ANION GAP SERPL CALCULATED.3IONS-SCNC: 6 MMOL/L (ref 3–14)
AST SERPL W P-5'-P-CCNC: 17 U/L (ref 0–45)
BILIRUB SERPL-MCNC: 0.8 MG/DL (ref 0.2–1.3)
BUN SERPL-MCNC: 18 MG/DL (ref 7–30)
CALCIUM SERPL-MCNC: 8.9 MG/DL (ref 8.5–10.1)
CHLORIDE BLD-SCNC: 108 MMOL/L (ref 94–109)
CHOLEST SERPL-MCNC: 147 MG/DL
CO2 SERPL-SCNC: 28 MMOL/L (ref 20–32)
CREAT SERPL-MCNC: 0.95 MG/DL (ref 0.66–1.25)
CREAT UR-MCNC: 202 MG/DL
FASTING STATUS PATIENT QL REPORTED: YES
GFR SERPL CREATININE-BSD FRML MDRD: >90 ML/MIN/1.73M2
GLUCOSE BLD-MCNC: 127 MG/DL (ref 70–99)
HBA1C MFR BLD: 6.6 % (ref 0–5.6)
HCT VFR BLD AUTO: 46.5 % (ref 40–53)
HDLC SERPL-MCNC: 55 MG/DL
LDLC SERPL CALC-MCNC: 80 MG/DL
MICROALBUMIN UR-MCNC: 8 MG/L
MICROALBUMIN/CREAT UR: 3.96 MG/G CR (ref 0–17)
NONHDLC SERPL-MCNC: 92 MG/DL
POTASSIUM BLD-SCNC: 4 MMOL/L (ref 3.4–5.3)
PROT SERPL-MCNC: 7.1 G/DL (ref 6.8–8.8)
SODIUM SERPL-SCNC: 142 MMOL/L (ref 133–144)
TRIGL SERPL-MCNC: 59 MG/DL
TSH SERPL DL<=0.005 MIU/L-ACNC: 1.56 MU/L (ref 0.4–4)

## 2022-11-23 PROCEDURE — 83036 HEMOGLOBIN GLYCOSYLATED A1C: CPT

## 2022-11-23 PROCEDURE — 85014 HEMATOCRIT: CPT

## 2022-11-23 PROCEDURE — 99214 OFFICE O/P EST MOD 30 MIN: CPT | Performed by: INTERNAL MEDICINE

## 2022-11-23 PROCEDURE — 36415 COLL VENOUS BLD VENIPUNCTURE: CPT

## 2022-11-23 PROCEDURE — 95251 CONT GLUC MNTR ANALYSIS I&R: CPT | Performed by: INTERNAL MEDICINE

## 2022-11-23 PROCEDURE — 80053 COMPREHEN METABOLIC PANEL: CPT

## 2022-11-23 PROCEDURE — 82043 UR ALBUMIN QUANTITATIVE: CPT

## 2022-11-23 PROCEDURE — 80061 LIPID PANEL: CPT

## 2022-11-23 PROCEDURE — 84443 ASSAY THYROID STIM HORMONE: CPT

## 2022-11-23 RX ORDER — OMEPRAZOLE 40 MG/1
40 CAPSULE, DELAYED RELEASE ORAL DAILY
Qty: 90 CAPSULE | Refills: 0 | Status: CANCELLED | OUTPATIENT
Start: 2022-11-23

## 2022-11-23 RX ORDER — INSULIN GLARGINE 100 [IU]/ML
22 INJECTION, SOLUTION SUBCUTANEOUS AT BEDTIME
Qty: 21 ML | Refills: 3 | Status: SHIPPED | OUTPATIENT
Start: 2022-11-23 | End: 2023-11-20

## 2022-11-23 RX ORDER — INSULIN LISPRO 100 [IU]/ML
INJECTION, SOLUTION INTRAVENOUS; SUBCUTANEOUS
Qty: 45 ML | Refills: 3 | Status: SHIPPED | OUTPATIENT
Start: 2022-11-23 | End: 2023-04-18

## 2022-11-23 RX ORDER — PEN NEEDLE, DIABETIC 32 GX 1/4"
NEEDLE, DISPOSABLE MISCELLANEOUS
Qty: 400 EACH | Refills: 3 | Status: SHIPPED | OUTPATIENT
Start: 2022-11-23 | End: 2023-12-18

## 2022-11-23 RX ORDER — ATORVASTATIN CALCIUM 10 MG/1
10 TABLET, FILM COATED ORAL AT BEDTIME
Qty: 90 TABLET | Refills: 3 | Status: SHIPPED | OUTPATIENT
Start: 2022-11-23 | End: 2023-12-18

## 2022-11-23 RX ORDER — PEN NEEDLE, DIABETIC 32 GX 1/4"
NEEDLE, DISPOSABLE MISCELLANEOUS
Qty: 400 EACH | Refills: 0 | OUTPATIENT
Start: 2022-11-23 | End: 2022-11-23

## 2022-11-23 NOTE — PROGRESS NOTES
Tl Sands is a 52 year old man seen in follow-up for type 1 diabetes, diagnosed in 2005.  His last visit in our clinic was 1 year ago.    He has not known diabetes complications and his average hemoglobin A1c over the recent years has been around 7 to 7.5.  Most recent A1c was 7.8%, in February 2022.  It was 6.6% today.    The Dexcom records reveal an average glucose of 145 with a large range of variation. 75% of the sensor numbers are within target of , 6% are above 250, 6% are in the mild hypoglycemic range and 4% are below 54.   The hypoglycemic episodes are unpredictable throughout the day.  They are occasionally present during the night, generally following a postprandial spike.    Diabetes complications:  Last eye exam 5/2022 -  no DR   No h/o proteinuria.  GFR >90; urine microalbumin done today and pending at the time of the visit  No numbness or tingling sensation   He denies prior episodes of loss of consciousness due to hypoglycemia.  The lowest blood glucose he remembers was 40.  Feels weak and jittery when his BG is low. With severe lows he has sweating.   He does have glucagon at home.  Exercise: Summertime, he plays golf. He has been hunting in the fall.  He has only been taking 1/2 tablets of 10 mg atorvastatin daily.   11/23/2022: LDL cholesterol 80, HDL cholesterol 55, triglycerides 59    The patient confirms he has been struggling to bolus insulin prior to eating.  Most of the times, he ends up taking insulin while eating more afterwards.  He remembers to take it before meals 10 to 20% of time.  He has been using my fitness pal for carbohydrate counting.  He reports using an insulin to carbohydrate ratio of 1 unit per 10 g carbohydrates and a correction of 1 unit per 25 above 120.  Lantus is still 22 units in the morning.    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   Type 1 diabetes   Tinea pedis   Finger fracture   ED  LBP     PSM:  B/L inguinal hernia repair   B/L  arthroscopic knee surgery        Current Outpatient Medications:      aspirin (ASA) 81 MG EC tablet, Take 1 tablet (81 mg) by mouth daily, Disp: 90 tablet, Rfl: 3     atorvastatin (LIPITOR) 10 MG tablet, Take 0.5 tablets (5 mg) by mouth At Bedtime, Disp: 45 tablet, Rfl: 3     clobetasol (TEMOVATE) 0.05 % external cream, Apply twice daily up to 1 week for scar, take 1 week off, repeat if needed, Disp: 45 g, Rfl: 0     clotrimazole (LOTRIMIN) 1 % external cream, Apply topically 2 times daily, Disp: 60 g, Rfl: 0     Continuous Blood Gluc Sensor (DEXCOM G6 SENSOR) MISC, USE ONE SUBCUTANEOUSLY EVERY 10 DAYS., Disp: 9 each, Rfl: 1     Continuous Blood Gluc Transmit (DEXCOM G6 TRANSMITTER) MISC, CHANGE EVERY 3 MONTHS., Disp: 1 each, Rfl: 1     econazole nitrate 1 % external cream, Apply topically 2 times daily, Disp: 60 g, Rfl: 3     fluorouracil (EFUDEX) 5 % external cream, Apply twice daily for 3 weeks to temples, Disp: 40 g, Rfl: 0     Glucagon (BAQSIMI ONE PACK) 3 MG/DOSE POWD, Spray 3 mg in nostril daily as needed (for severe hypoglycemic episodes), Disp: 1 each, Rfl: 3     HUMALOG KWIKPEN 100 UNIT/ML soln, INJECT 40 UNITS SUBCUTANEOUSLY ONCE DAILY FOR CARB COVERAGE, CORRECTION, AND PRIMING AS DIRECTED, Disp: 45 mL, Rfl: 1     insulin glargine (LANTUS SOLOSTAR) 100 UNIT/ML pen, Inject 22 Units Subcutaneous At Bedtime INJECT 22 UNITS SUBCUTANEOUSLY ONCE DAILY. Add 2 units to prime pen., Disp: 30 mL, Rfl: 1     insulin pen needle (BD PEN NEEDLE MICRO U/F) 32G X 6 MM miscellaneous, Use 4 times daily or as directed., Disp: 400 each, Rfl: 0     meloxicam (MOBIC) 15 MG tablet, Take 1 tablet (15 mg) by mouth daily as needed for moderate pain, Disp: 90 tablet, Rfl: 0     mometasone (ELOCON) 0.1 % external solution, Apply small amount to rash on scalp up to once daily as needed for up to 1 week, Disp: 60 mL, Rfl: 11     omeprazole (PRILOSEC) 40 MG DR capsule, Take 1 capsule by mouth once daily, Disp: 90 capsule, Rfl: 0      tadalafil (CIALIS) 20 MG tablet, TAKE 1 TABLET BY MOUTH AS NEEDED, Disp: 20 tablet, Rfl: 0  No current facility-administered medications for this visit.    Facility-Administered Medications Ordered in Other Visits:      bupivacaine (MARCAINE) injection 0.5% (PF), 2 mL, INTRAPLEURAL, Once, Thai Mims DO     methylPREDNISolone (DEPO-MEDROL) injection 80 mg, 80 mg, Intramuscular, Once, Thai Mims DO    HABITS:  He does not use tobacco or alcohol.      SOCIAL HISTORY:  He is  and lives with his wife and children in [place]. 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      Family history  Maternal grandfather - type 1 diabetes. Paternal uncle also has type 1 diabetes. No f/h of thyroid disease. Paternal uncle - colon cancer.    PHYSICAL EXAMINATION:   Wt Readings from Last 10 Encounters:   11/23/22 95.3 kg (210 lb)   11/24/21 97.8 kg (215 lb 11.2 oz)   08/17/21 97.6 kg (215 lb 3.2 oz)   01/06/21 101.3 kg (223 lb 4 oz)   09/15/20 95.3 kg (210 lb)   01/09/20 99.3 kg (219 lb)   12/26/19 99.6 kg (219 lb 9.6 oz)   11/22/19 99.2 kg (218 lb 12.8 oz)   08/28/19 96.3 kg (212 lb 4.8 oz)   04/15/19 95.3 kg (210 lb)     BP Readings from Last 6 Encounters:   11/23/22 126/77   11/24/21 127/82   08/17/21 138/78   01/06/21 (!) 140/90   08/06/20 137/84   02/06/20 131/88     /77 (BP Location: Left arm, Patient Position: Sitting, Cuff Size: Adult Regular)   Pulse 73   Wt 95.3 kg (210 lb)   SpO2 99%   BMI 30.35 kg/m      General appearance: he is well-developed, well-nourished, and in no distress.  Eyes: conjunctivae and extraocular motions are normal. Pupils are equal, round, and reactive to light. No lid lag, no stare.  HENT: oropharynx clear and moist; neck no JVD, no bruits, no thyromegaly, no palpable nodules  Cardiovascular: regular rhythm, no murmurs, distal pulses palpable, no edema  Respiratory: chest clear, no rales, no rhonchi  Gastrointestinal: abdomen soft, nontender, nondistended, +BS, no  organomegaly  Musculoskeletal: normal tone and strength   Neurologic: reflexes normal and symmetric, no resting tremor  Psychiatric: affect and judgment normal   Skin: Mild dystrophy at the site of prior abdominal insulin injections  Feet: sensation intact to monofilament testing    LABORATORY TESTS:   I reviewed prior lab results documented in Epic.   Lab Results   Component Value Date    A1C 6.6 (H) 11/23/2022    A1C 7.8 (H) 02/01/2022    A1C 7.6 (H) 09/10/2021    A1C 6.7 (H) 09/29/2020    A1C 8.0 (A) 08/28/2019    A1C 7.2 08/29/2018    A1C 7.1 (H) 07/26/2017    A1C 7.6 09/13/2016       Hemoglobin   Date Value Ref Range Status   11/22/2019 15.1 13.3 - 17.7 g/dL Final     Hematocrit   Date Value Ref Range Status   11/23/2022 46.5 40.0 - 53.0 % Final   09/29/2020 42.4 40.0 - 53.0 % Final     Cholesterol   Date Value Ref Range Status   11/23/2022 147 <200 mg/dL Final   09/29/2020 141 <200 mg/dL Final     Cholesterol/HDL Ratio   Date Value Ref Range Status   03/17/2015 3.2 0.0 - 5.0 Final     HDL Cholesterol   Date Value Ref Range Status   09/29/2020 54 >39 mg/dL Final     Direct Measure HDL   Date Value Ref Range Status   11/23/2022 55 >=40 mg/dL Final     LDL Cholesterol Calculated   Date Value Ref Range Status   11/23/2022 80 <=100 mg/dL Final   09/29/2020 69 <100 mg/dL Final     Comment:     Desirable:       <100 mg/dl     VLDL-Cholesterol   Date Value Ref Range Status   03/17/2015 23 0 - 30 mg/dL Final     Triglycerides   Date Value Ref Range Status   11/23/2022 59 <150 mg/dL Final   09/29/2020 91 <150 mg/dL Final     Comment:     Non Fasting     Albumin Urine mg/L   Date Value Ref Range Status   09/29/2020 10 mg/L Final     TSH   Date Value Ref Range Status   11/23/2022 1.56 0.40 - 4.00 mU/L Final   07/26/2017 1.37 0.40 - 4.00 mU/L Final         Last Basic Metabolic Panel:    Sodium   Date Value Ref Range Status   11/23/2022 142 133 - 144 mmol/L Final   09/29/2020 139 133 - 144 mmol/L Final     Potassium   Date  Value Ref Range Status   11/23/2022 4.0 3.4 - 5.3 mmol/L Final   09/29/2020 4.2 3.4 - 5.3 mmol/L Final     Chloride   Date Value Ref Range Status   11/23/2022 108 94 - 109 mmol/L Final   09/29/2020 107 94 - 109 mmol/L Final     Calcium   Date Value Ref Range Status   11/23/2022 8.9 8.5 - 10.1 mg/dL Final   09/29/2020 8.4 (L) 8.5 - 10.1 mg/dL Final     Carbon Dioxide   Date Value Ref Range Status   09/29/2020 29 20 - 32 mmol/L Final     Carbon Dioxide (CO2)   Date Value Ref Range Status   11/23/2022 28 20 - 32 mmol/L Final     Urea Nitrogen   Date Value Ref Range Status   11/23/2022 18 7 - 30 mg/dL Final   09/29/2020 21 7 - 30 mg/dL Final     Creatinine   Date Value Ref Range Status   11/23/2022 0.95 0.66 - 1.25 mg/dL Final   09/29/2020 0.81 0.66 - 1.25 mg/dL Final     GFR Estimate   Date Value Ref Range Status   11/23/2022 >90 >60 mL/min/1.73m2 Final     Comment:     Effective December 21, 2021 eGFRcr in adults is calculated using the 2021 CKD-EPI creatinine equation which includes age and gender (Latasha et al., NEJ, DOI: 10.1056/IJJUku9475596)   09/29/2020 >90 >60 mL/min/[1.73_m2] Final     Comment:     Non  GFR Calc  Starting 12/18/2018, serum creatinine based estimated GFR (eGFR) will be   calculated using the Chronic Kidney Disease Epidemiology Collaboration   (CKD-EPI) equation.       Glucose   Date Value Ref Range Status   11/23/2022 127 (H) 70 - 99 mg/dL Final   09/29/2020 226 (H) 70 - 99 mg/dL Final     Comment:     Non Fasting       AST   Date Value Ref Range Status   11/23/2022 17 0 - 45 U/L Final   09/29/2020 18 0 - 45 U/L Final     ALT   Date Value Ref Range Status   11/23/2022 31 0 - 70 U/L Final   09/29/2020 31 0 - 70 U/L Final     Albumin Urine mg/g Cr   Date Value Ref Range Status   09/29/2020 4.15 0 - 17 mg/g Cr Final     Assessment and plan:    1.  Type 1 diabetes mellitus, suboptimally controlled, not known to be complicated by microvascular disease. He does have mild hypoglycemia  unawareness.  He maintains a good A1c at the expense of a wide range of variation of the blood sugar. Long-term, I think he is going to benefit from the use of a controlled loop insulin pump.  Discussed about the option of transitioning to Omnipod, since the patient would prefer a wireless insulin pump.  He is going to check with his insurance and schedule an appointment with our diabetes educator.  In time, I advised the patient to try to take insulin prior to eating, ideally 15 to 20 minutes before eating.  Discussed about the correct use of the correction scale, based on the Premeal blood sugar.  The correction scale should not be used more often than 4 hours apart.  Encouraged him to record the carbohydrate intake and the insulin dose administered using the Dexcom esther, and have the esther downloaded for our review in 2-3 weeks.  Also discussed about ways he can prevent exercise-induced hypoglycemia, by only taking half of the dose of Humalog for the meal prior or by having 15 to 20 g carbohydrate snack, if he exercises prior to a meal.    2. Hypercholesterolemia  Increase the dose of atorvastatin to 10 mg daily.    Orders Placed This Encounter   Procedures     Continuous Glucose Monitoring >=72 hours [facility]      34 minutes spent on the date of the encounter doing chart review, history and exam, documentation and further activities as noted above.

## 2022-11-23 NOTE — NURSING NOTE
Tl Sands's goals for this visit include:   Chief Complaint   Patient presents with     RECHECK     Follow up diabetes     He requests these members of his care team be copied on today's visit information: yes    PCP: Jakub Wolff    Referring Provider:  No referring provider defined for this encounter.    /77 (BP Location: Left arm, Patient Position: Sitting, Cuff Size: Adult Regular)   Pulse 73   Wt 95.3 kg (210 lb)   SpO2 99%   BMI 30.35 kg/m      Do you need any medication refills at today's visit? yes    Wes Sullivan CMA  Adult Endocrinology  [facility]

## 2022-11-23 NOTE — LETTER
11/23/2022         RE: Tl Sands  [address]  [facility] [address]        Dear Colleague,    Thank you for referring your patient, Tl Sands, to the [facility]. Please see a copy of my visit note below.    Outcome for 11/18/22 12:11 PM: [employer] message sent requesting blood sugar readings.  Phoebe Lira, [facility]  Adult Endocrinology  [facility], [place]        Tl Sands is a 52 year old man seen in follow-up for type 1 diabetes, diagnosed in 2005.  His last visit in our clinic was 1 year ago.    He has not known diabetes complications and his average hemoglobin A1c over the recent years has been around 7 to 7.5.  Most recent A1c was 7.8%, in February 2022.  It was 6.6% today.    The Dexcom records reveal an average glucose of 145 with a large range of variation. 75% of the sensor numbers are within target of , 6% are above 250, 6% are in the mild hypoglycemic range and 4% are below 54.   The hypoglycemic episodes are unpredictable throughout the day.  They are occasionally present during the night, generally following a postprandial spike.    Diabetes complications:  Last eye exam 5/2022 -  no DR   No h/o proteinuria.  GFR >90; urine microalbumin done today and pending at the time of the visit  No numbness or tingling sensation   He denies prior episodes of loss of consciousness due to hypoglycemia.  The lowest blood glucose he remembers was 40.  Feels weak and jittery when his BG is low. With severe lows he has sweating.   He does have glucagon at home.  Exercise: Summertime, he plays golf. He has been hunting in the fall.  He has only been taking 1/2 tablets of 10 mg atorvastatin daily.   11/23/2022: LDL cholesterol 80, HDL cholesterol 55, triglycerides 59    The patient confirms he has been struggling to bolus insulin prior to eating.  Most of the times, he ends up taking insulin while eating more afterwards.  He remembers to take it before meals  10 to 20% of time.  He has been using my fitness pal for carbohydrate counting.  He reports using an insulin to carbohydrate ratio of 1 unit per 10 g carbohydrates and a correction of 1 unit per 25 above 120.  Lantus is still 22 units in the morning.    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   Type 1 diabetes   Tinea pedis   Finger fracture   ED  LBP     PSM:  B/L inguinal hernia repair   B/L arthroscopic knee surgery        Current Outpatient Medications:      aspirin (ASA) 81 MG EC tablet, Take 1 tablet (81 mg) by mouth daily, Disp: 90 tablet, Rfl: 3     atorvastatin (LIPITOR) 10 MG tablet, Take 0.5 tablets (5 mg) by mouth At Bedtime, Disp: 45 tablet, Rfl: 3     clobetasol (TEMOVATE) 0.05 % external cream, Apply twice daily up to 1 week for scar, take 1 week off, repeat if needed, Disp: 45 g, Rfl: 0     clotrimazole (LOTRIMIN) 1 % external cream, Apply topically 2 times daily, Disp: 60 g, Rfl: 0     Continuous Blood Gluc Sensor (DEXCOM G6 SENSOR) MISC, USE ONE SUBCUTANEOUSLY EVERY 10 DAYS., Disp: 9 each, Rfl: 1     Continuous Blood Gluc Transmit (DEXCOM G6 TRANSMITTER) MISC, CHANGE EVERY 3 MONTHS., Disp: 1 each, Rfl: 1     econazole nitrate 1 % external cream, Apply topically 2 times daily, Disp: 60 g, Rfl: 3     fluorouracil (EFUDEX) 5 % external cream, Apply twice daily for 3 weeks to temples, Disp: 40 g, Rfl: 0     Glucagon (BAQSIMI ONE PACK) 3 MG/DOSE POWD, Spray 3 mg in nostril daily as needed (for severe hypoglycemic episodes), Disp: 1 each, Rfl: 3     HUMALOG KWIKPEN 100 UNIT/ML soln, INJECT 40 UNITS SUBCUTANEOUSLY ONCE DAILY FOR CARB COVERAGE, CORRECTION, AND PRIMING AS DIRECTED, Disp: 45 mL, Rfl: 1     insulin glargine (LANTUS SOLOSTAR) 100 UNIT/ML pen, Inject 22 Units Subcutaneous At Bedtime INJECT 22 UNITS SUBCUTANEOUSLY ONCE DAILY. Add 2 units to prime pen., Disp: 30 mL, Rfl: 1     insulin pen needle (BD PEN NEEDLE MICRO U/F) 32G X 6 MM miscellaneous, Use 4 times daily or as directed.,  Disp: 400 each, Rfl: 0     meloxicam (MOBIC) 15 MG tablet, Take 1 tablet (15 mg) by mouth daily as needed for moderate pain, Disp: 90 tablet, Rfl: 0     mometasone (ELOCON) 0.1 % external solution, Apply small amount to rash on scalp up to once daily as needed for up to 1 week, Disp: 60 mL, Rfl: 11     omeprazole (PRILOSEC) 40 MG DR capsule, Take 1 capsule by mouth once daily, Disp: 90 capsule, Rfl: 0     tadalafil (CIALIS) 20 MG tablet, TAKE 1 TABLET BY MOUTH AS NEEDED, Disp: 20 tablet, Rfl: 0  No current facility-administered medications for this visit.    Facility-Administered Medications Ordered in Other Visits:      bupivacaine (MARCAINE) injection 0.5% (PF), 2 mL, INTRAPLEURAL, Once, Thai Mims DO     methylPREDNISolone (DEPO-MEDROL) injection 80 mg, 80 mg, Intramuscular, Once, Thai iMms,     HABITS:  He does not use tobacco or alcohol.      SOCIAL HISTORY:  He is  and lives with his wife and children in [place]. 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      Family history  Maternal grandfather - type 1 diabetes. Paternal uncle also has type 1 diabetes. No f/h of thyroid disease. Paternal uncle - colon cancer.    PHYSICAL EXAMINATION:   Wt Readings from Last 10 Encounters:   11/23/22 95.3 kg (210 lb)   11/24/21 97.8 kg (215 lb 11.2 oz)   08/17/21 97.6 kg (215 lb 3.2 oz)   01/06/21 101.3 kg (223 lb 4 oz)   09/15/20 95.3 kg (210 lb)   01/09/20 99.3 kg (219 lb)   12/26/19 99.6 kg (219 lb 9.6 oz)   11/22/19 99.2 kg (218 lb 12.8 oz)   08/28/19 96.3 kg (212 lb 4.8 oz)   04/15/19 95.3 kg (210 lb)     BP Readings from Last 6 Encounters:   11/23/22 126/77   11/24/21 127/82   08/17/21 138/78   01/06/21 (!) 140/90   08/06/20 137/84   02/06/20 131/88     /77 (BP Location: Left arm, Patient Position: Sitting, Cuff Size: Adult Regular)   Pulse 73   Wt 95.3 kg (210 lb)   SpO2 99%   BMI 30.35 kg/m      General appearance: he is well-developed, well-nourished, and in no distress.  Eyes:  conjunctivae and extraocular motions are normal. Pupils are equal, round, and reactive to light. No lid lag, no stare.  HENT: oropharynx clear and moist; neck no JVD, no bruits, no thyromegaly, no palpable nodules  Cardiovascular: regular rhythm, no murmurs, distal pulses palpable, no edema  Respiratory: chest clear, no rales, no rhonchi  Gastrointestinal: abdomen soft, nontender, nondistended, +BS, no organomegaly  Musculoskeletal: normal tone and strength   Neurologic: reflexes normal and symmetric, no resting tremor  Psychiatric: affect and judgment normal   Skin: Mild dystrophy at the site of prior abdominal insulin injections  Feet: sensation intact to monofilament testing    LABORATORY TESTS:   I reviewed prior lab results documented in Epic.   Lab Results   Component Value Date    A1C 6.6 (H) 11/23/2022    A1C 7.8 (H) 02/01/2022    A1C 7.6 (H) 09/10/2021    A1C 6.7 (H) 09/29/2020    A1C 8.0 (A) 08/28/2019    A1C 7.2 08/29/2018    A1C 7.1 (H) 07/26/2017    A1C 7.6 09/13/2016       Hemoglobin   Date Value Ref Range Status   11/22/2019 15.1 13.3 - 17.7 g/dL Final     Hematocrit   Date Value Ref Range Status   11/23/2022 46.5 40.0 - 53.0 % Final   09/29/2020 42.4 40.0 - 53.0 % Final     Cholesterol   Date Value Ref Range Status   11/23/2022 147 <200 mg/dL Final   09/29/2020 141 <200 mg/dL Final     Cholesterol/HDL Ratio   Date Value Ref Range Status   03/17/2015 3.2 0.0 - 5.0 Final     HDL Cholesterol   Date Value Ref Range Status   09/29/2020 54 >39 mg/dL Final     Direct Measure HDL   Date Value Ref Range Status   11/23/2022 55 >=40 mg/dL Final     LDL Cholesterol Calculated   Date Value Ref Range Status   11/23/2022 80 <=100 mg/dL Final   09/29/2020 69 <100 mg/dL Final     Comment:     Desirable:       <100 mg/dl     VLDL-Cholesterol   Date Value Ref Range Status   03/17/2015 23 0 - 30 mg/dL Final     Triglycerides   Date Value Ref Range Status   11/23/2022 59 <150 mg/dL Final   09/29/2020 91 <150 mg/dL Final      Comment:     Non Fasting     Albumin Urine mg/L   Date Value Ref Range Status   09/29/2020 10 mg/L Final     TSH   Date Value Ref Range Status   11/23/2022 1.56 0.40 - 4.00 mU/L Final   07/26/2017 1.37 0.40 - 4.00 mU/L Final         Last Basic Metabolic Panel:    Sodium   Date Value Ref Range Status   11/23/2022 142 133 - 144 mmol/L Final   09/29/2020 139 133 - 144 mmol/L Final     Potassium   Date Value Ref Range Status   11/23/2022 4.0 3.4 - 5.3 mmol/L Final   09/29/2020 4.2 3.4 - 5.3 mmol/L Final     Chloride   Date Value Ref Range Status   11/23/2022 108 94 - 109 mmol/L Final   09/29/2020 107 94 - 109 mmol/L Final     Calcium   Date Value Ref Range Status   11/23/2022 8.9 8.5 - 10.1 mg/dL Final   09/29/2020 8.4 (L) 8.5 - 10.1 mg/dL Final     Carbon Dioxide   Date Value Ref Range Status   09/29/2020 29 20 - 32 mmol/L Final     Carbon Dioxide (CO2)   Date Value Ref Range Status   11/23/2022 28 20 - 32 mmol/L Final     Urea Nitrogen   Date Value Ref Range Status   11/23/2022 18 7 - 30 mg/dL Final   09/29/2020 21 7 - 30 mg/dL Final     Creatinine   Date Value Ref Range Status   11/23/2022 0.95 0.66 - 1.25 mg/dL Final   09/29/2020 0.81 0.66 - 1.25 mg/dL Final     GFR Estimate   Date Value Ref Range Status   11/23/2022 >90 >60 mL/min/1.73m2 Final     Comment:     Effective December 21, 2021 eGFRcr in adults is calculated using the 2021 CKD-EPI creatinine equation which includes age and gender (Latasha et al., NEJM, DOI: 10.1056/PHREnb6279345)   09/29/2020 >90 >60 mL/min/[1.73_m2] Final     Comment:     Non  GFR Calc  Starting 12/18/2018, serum creatinine based estimated GFR (eGFR) will be   calculated using the Chronic Kidney Disease Epidemiology Collaboration   (CKD-EPI) equation.       Glucose   Date Value Ref Range Status   11/23/2022 127 (H) 70 - 99 mg/dL Final   09/29/2020 226 (H) 70 - 99 mg/dL Final     Comment:     Non Fasting       AST   Date Value Ref Range Status   11/23/2022 17 0 - 45 U/L  Final   09/29/2020 18 0 - 45 U/L Final     ALT   Date Value Ref Range Status   11/23/2022 31 0 - 70 U/L Final   09/29/2020 31 0 - 70 U/L Final     Albumin Urine mg/g Cr   Date Value Ref Range Status   09/29/2020 4.15 0 - 17 mg/g Cr Final     Assessment and plan:    1.  Type 1 diabetes mellitus, suboptimally controlled, not known to be complicated by microvascular disease. 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  He maintains a good A1c at the expense of a wide range of variation of the blood sugar. Long-term, I think he is going to benefit from the use of a controlled loop insulin pump.  Discussed about the option of transitioning to Omnipod, since the patient would prefer a wireless insulin pump.  He is going to check with his insurance and schedule an appointment with our diabetes educator.  In time, I advised the patient to try to take insulin prior to eating, ideally 15 to 20 minutes before eating.  Discussed about the correct use of the correction scale, based on the Premeal blood sugar.  The correction scale should not be used more often than 4 hours apart.  Encouraged him to record the carbohydrate intake and the insulin dose administered using the Dexcom esther, and have the esther downloaded for our review in 2-3 weeks.  Also discussed about ways he can prevent exercise-induced hypoglycemia, by only taking half of the dose of Humalog for the meal prior or by having 15 to 20 g carbohydrate snack, if he exercises prior to a meal.    2. Hypercholesterolemia  Increase the dose of atorvastatin to 10 mg daily.    Orders Placed This Encounter   Procedures     Continuous Glucose Monitoring >=72 hours PH[facility] Adult Diabetes Educator Referral      34 minutes spent on the date of the encounter doing chart review, history and exam, documentation and further activities as noted above.                   Again, thank you for allowing me to participate in the care of your patient.        Sincerely,        Rupali  Ventura Resendiz MD

## 2022-11-23 NOTE — PATIENT INSTRUCTIONS
If you exercise within 1-2 hrs after a meal, only take 1/2 of the dose of Humalog for that meal  If you exercise prior to a meal, you should have a 15-20 gm CHO snack prior   Administer Humalog 15 minutes prior to eating   Always use the correction Humalog prior to meals and at bedtime   Do not use the correction more often then 4 hrs apart       [facility]-Department of Endocrinology  Diabetes Educators:   Dulce Jose RN and Aspen Gill RN  Clinic Nurse: JASPER Lawrence  CMA's: Eloisa   LPN: Meagan  Scheduling/Clinic phone number : [phone]   Clinic Fax: [phone]  On-Call Endocrine [facility] (after hours/weekends): [phone] option 4    Please call the number below to schedule your labs.    Lab    General [phone]   [facility] [phone]   [place] [phone]   [facility]  [phone]   [facility] [phone]   [place] [phone]   [facility]) [phone]   [facility] [phone]   [place] [phone]   [place] [phone]   [place] [phone]     Please reach out to the following centers to schedule your imaging appointment:       Imaging (DEXA, CT, MRI, XRAY)    [facility] ([facility], Psychiatric/[facility], [place]) [phone]   [facility] ([place], [place]) [phone]   [facility] ([facility]) [phone]   [facility] ([facility]) [phone]     Appointment Reminders:  * Please bring meter with for staff to download  * If you are due ONLY for an A1C, it is scheduled with the nurse and will be done in clinic. You do not need to schedule a lab appointment. Fasting is not required for an A1C.  * Refill request should be submitted to your pharmacy. They will contact clinic for approval.  '

## 2022-11-26 RX ORDER — TADALAFIL 20 MG/1
20 TABLET ORAL DAILY PRN
Qty: 20 TABLET | Refills: 0 | Status: SHIPPED | OUTPATIENT
Start: 2022-11-26 | End: 2023-01-10

## 2023-01-07 ENCOUNTER — HEALTH MAINTENANCE LETTER (OUTPATIENT)
Age: 53
End: 2023-01-07

## 2023-01-09 ENCOUNTER — TRANSFERRED RECORDS (OUTPATIENT)
Dept: FAMILY MEDICINE | Facility: CLINIC | Age: 53
End: 2023-01-09

## 2023-01-10 ENCOUNTER — OFFICE VISIT (OUTPATIENT)
Dept: FAMILY MEDICINE | Facility: CLINIC | Age: 53
End: 2023-01-10
Payer: COMMERCIAL

## 2023-01-10 VITALS
SYSTOLIC BLOOD PRESSURE: 118 MMHG | WEIGHT: 220 LBS | RESPIRATION RATE: 14 BRPM | BODY MASS INDEX: 31.5 KG/M2 | TEMPERATURE: 97.8 F | DIASTOLIC BLOOD PRESSURE: 79 MMHG | HEIGHT: 70 IN | HEART RATE: 67 BPM | OXYGEN SATURATION: 98 %

## 2023-01-10 DIAGNOSIS — Z13.0 SCREENING FOR DEFICIENCY ANEMIA: ICD-10-CM

## 2023-01-10 DIAGNOSIS — K21.00 GASTROESOPHAGEAL REFLUX DISEASE WITH ESOPHAGITIS WITHOUT HEMORRHAGE: ICD-10-CM

## 2023-01-10 DIAGNOSIS — Z80.0 FAMILY HISTORY OF COLON CANCER: ICD-10-CM

## 2023-01-10 DIAGNOSIS — Z85.828 HX OF SKIN CANCER, BASAL CELL: ICD-10-CM

## 2023-01-10 DIAGNOSIS — N52.9 ERECTILE DYSFUNCTION, UNSPECIFIED ERECTILE DYSFUNCTION TYPE: ICD-10-CM

## 2023-01-10 DIAGNOSIS — Z12.11 COLON CANCER SCREENING: ICD-10-CM

## 2023-01-10 DIAGNOSIS — E10.9 TYPE 1 DIABETES MELLITUS WITHOUT COMPLICATION (H): ICD-10-CM

## 2023-01-10 DIAGNOSIS — G89.29 CHRONIC BILATERAL LOW BACK PAIN WITHOUT SCIATICA: ICD-10-CM

## 2023-01-10 DIAGNOSIS — Z12.5 PROSTATE CANCER SCREENING: ICD-10-CM

## 2023-01-10 DIAGNOSIS — M54.50 CHRONIC BILATERAL LOW BACK PAIN WITHOUT SCIATICA: ICD-10-CM

## 2023-01-10 DIAGNOSIS — Z00.00 ROUTINE GENERAL MEDICAL EXAMINATION AT A HEALTH CARE FACILITY: Primary | ICD-10-CM

## 2023-01-10 DIAGNOSIS — R05.3 CHRONIC COUGH: ICD-10-CM

## 2023-01-10 PROCEDURE — 99396 PREV VISIT EST AGE 40-64: CPT | Mod: 25 | Performed by: FAMILY MEDICINE

## 2023-01-10 PROCEDURE — 99214 OFFICE O/P EST MOD 30 MIN: CPT | Mod: 25 | Performed by: FAMILY MEDICINE

## 2023-01-10 PROCEDURE — 90471 IMMUNIZATION ADMIN: CPT | Performed by: FAMILY MEDICINE

## 2023-01-10 PROCEDURE — 90715 TDAP VACCINE 7 YRS/> IM: CPT | Performed by: FAMILY MEDICINE

## 2023-01-10 PROCEDURE — 90677 PCV20 VACCINE IM: CPT | Performed by: FAMILY MEDICINE

## 2023-01-10 PROCEDURE — 90472 IMMUNIZATION ADMIN EACH ADD: CPT | Performed by: FAMILY MEDICINE

## 2023-01-10 RX ORDER — TADALAFIL 20 MG/1
20 TABLET ORAL DAILY PRN
Qty: 20 TABLET | Refills: 3 | Status: SHIPPED | OUTPATIENT
Start: 2023-01-10 | End: 2024-01-17

## 2023-01-10 RX ORDER — MELOXICAM 15 MG/1
15 TABLET ORAL DAILY PRN
Qty: 90 TABLET | Refills: 0 | Status: SHIPPED | OUTPATIENT
Start: 2023-01-10

## 2023-01-10 RX ORDER — OMEPRAZOLE 40 MG/1
40 CAPSULE, DELAYED RELEASE ORAL DAILY
Qty: 90 CAPSULE | Refills: 3 | Status: SHIPPED | OUTPATIENT
Start: 2023-01-10

## 2023-01-10 ASSESSMENT — ENCOUNTER SYMPTOMS
DIZZINESS: 0
SHORTNESS OF BREATH: 0
EYE PAIN: 0
PARESTHESIAS: 0
NERVOUS/ANXIOUS: 0
SORE THROAT: 0
ARTHRALGIAS: 0
MYALGIAS: 0
COUGH: 0
JOINT SWELLING: 0
DIARRHEA: 0
NAUSEA: 0
CHILLS: 0
HEARTBURN: 0
FEVER: 0
HEMATOCHEZIA: 0
FREQUENCY: 0
HEADACHES: 0
CONSTIPATION: 0
ABDOMINAL PAIN: 0
DYSURIA: 0
WEAKNESS: 0
PALPITATIONS: 0
HEMATURIA: 0

## 2023-01-10 ASSESSMENT — PAIN SCALES - GENERAL: PAINLEVEL: NO PAIN (0)
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
## 2023-01-10 NOTE — NURSING NOTE
Prior to immunization administration, verified patients identity using patient s name and date of birth. Please see Immunization Activity for additional information.     Screening Questionnaire for Adult Immunization    Are you sick today?   No   Do you have allergies to medications, food, a vaccine component or latex?   No   Have you ever had a serious reaction after receiving a vaccination?   No   Do you have a long-term health problem with heart, lung, kidney, or metabolic disease (e.g., diabetes), asthma, a blood disorder, no spleen, complement component deficiency, a cochlear implant, or a spinal fluid leak?  Are you on long-term aspirin therapy?   Yes   Do you have cancer, leukemia, HIV/AIDS, or any other immune system problem?   No   Do you have a parent, brother, or sister with an immune system problem?   No   In the past 3 months, have you taken medications that affect  your immune system, such as prednisone, other steroids, or anticancer drugs; drugs for the treatment of rheumatoid arthritis, Crohn s disease, or psoriasis; or have you had radiation treatments?   No   Have you had a seizure, or a brain or other nervous system problem?   No   During the past year, have you received a transfusion of blood or blood    products, or been given immune (gamma) globulin or antiviral drug?   No   For women: Are you pregnant or is there a chance you could become       pregnant during the next month?   No   Have you received any vaccinations in the past 4 weeks?   No     Immunization questionnaire was positive for at least one answer.  Notified provider aware.        Patient instructed to remain in clinic for 15 minutes afterwards, and to report any adverse reaction to me immediately.       Screening performed by Marcelino Malone MA on 1/10/2023 at 3:48 PM.

## 2023-01-12 ENCOUNTER — TRANSFERRED RECORDS (OUTPATIENT)
Dept: FAMILY MEDICINE | Facility: CLINIC | Age: 53
End: 2023-01-12

## 2023-01-31 DIAGNOSIS — E10.9 TYPE I DIABETES MELLITUS, WELL CONTROLLED (H): ICD-10-CM

## 2023-02-02 ENCOUNTER — MYC MEDICAL ADVICE (OUTPATIENT)
Dept: EDUCATION SERVICES | Facility: CLINIC | Age: 53
End: 2023-02-02
Payer: COMMERCIAL

## 2023-02-02 RX ORDER — PROCHLORPERAZINE 25 MG/1
SUPPOSITORY RECTAL
Qty: 9 EACH | Refills: 3 | Status: SHIPPED | OUTPATIENT
Start: 2023-02-02 | End: 2024-01-17

## 2023-02-02 RX ORDER — PROCHLORPERAZINE 25 MG/1
SUPPOSITORY RECTAL
Qty: 1 EACH | Refills: 3 | Status: SHIPPED | OUTPATIENT
Start: 2023-02-02 | End: 2024-01-17

## 2023-02-02 NOTE — TELEPHONE ENCOUNTER
Sent [employer] message advised Dexcom G6 continuous glucose monitor supplies completed and sent to [facility] in [place].    Aspen Gill RN, [facility]

## 2023-02-02 NOTE — TELEPHONE ENCOUNTER
Patient called to check the status of this prescription request. He states that his transmitter expires tonight and that this prescription was to be renewed at his 11/23/22 visit with Dr. Resendiz. Please follow up. Thank you.

## 2023-04-06 ENCOUNTER — TELEPHONE (OUTPATIENT)
Dept: ENDOCRINOLOGY | Facility: CLINIC | Age: 53
End: 2023-04-06
Payer: COMMERCIAL

## 2023-04-06 DIAGNOSIS — E10.9 TYPE I DIABETES MELLITUS, WELL CONTROLLED (H): Primary | ICD-10-CM

## 2023-04-06 RX ORDER — ACYCLOVIR 400 MG/1
1 TABLET ORAL
Qty: 9 EACH | Refills: 3 | Status: SHIPPED | OUTPATIENT
Start: 2023-04-06 | End: 2024-04-11

## 2023-04-06 NOTE — TELEPHONE ENCOUNTER
Attempted to reach patient by phone but got his voicemail.  Will submit Dexcom G7 continuous glucose monitor to [facility] in [place].  Also sent a [employer] message.    Aspen Gill RN, [facility]

## 2023-04-06 NOTE — TELEPHONE ENCOUNTER
[facility] Call Center    Phone Message    May a detailed message be left on voicemail: yes     Reason for Call: Other: Patient would like an Rx for a Dexcom G7.  Please contact patient to discuss     Action Taken: Other: endo    Travel Screening: Not Applicable

## 2023-04-18 ENCOUNTER — TELEPHONE (OUTPATIENT)
Dept: ENDOCRINOLOGY | Facility: CLINIC | Age: 53
End: 2023-04-18
Payer: COMMERCIAL

## 2023-04-18 DIAGNOSIS — E10.9 TYPE I DIABETES MELLITUS, WELL CONTROLLED (H): Primary | ICD-10-CM

## 2023-04-18 RX ORDER — INSULIN LISPRO 100 [IU]/ML
INJECTION, SOLUTION INTRAVENOUS; SUBCUTANEOUS
Qty: 45 ML | Refills: 3 | Status: SHIPPED | OUTPATIENT
Start: 2023-04-18 | End: 2023-08-28

## 2023-04-18 NOTE — TELEPHONE ENCOUNTER
Message from   [id number] - [place], [place] stating Humalog is not covered by the patients insurance. Formulary alternatives listed by the pharmacy are  humalog mix 75/25, Humulin R 500, insulin lispro prot and lispro, toujeo and trsiba.  Please review and advise.    Phoebe Lira CMA  Adult Endocrinology  [facility] [place]

## 2023-04-18 NOTE — TELEPHONE ENCOUNTER
See [employer] message sent to patient.  We will submit for generic lispro, same dosing.    Aspen Gill RN, [facility]

## 2023-04-19 NOTE — TELEPHONE ENCOUNTER
Please refer to 4/19/23 MyChart message from diabetes educator, Aspen DUEÑAS, for further details.       Melinda Singh, RN  Endocrine Care Coordinator  [facility]

## 2023-05-30 ENCOUNTER — MYC MEDICAL ADVICE (OUTPATIENT)
Dept: FAMILY MEDICINE | Facility: CLINIC | Age: 53
End: 2023-05-30
Payer: COMMERCIAL

## 2023-05-30 ENCOUNTER — NURSE TRIAGE (OUTPATIENT)
Dept: NURSING | Facility: CLINIC | Age: 53
End: 2023-05-30
Payer: COMMERCIAL
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
## 2023-05-31 NOTE — TELEPHONE ENCOUNTER
RN called patient to recommend going to ER based on MyChart details applied to protocol for cellulitis. Patient stated he was already in ER with an IV and tests begun, RN validated he was in the correct place to receive care. Patient appreciated phone call follow up for MyChart message. Patient will continue getting care at the ER.     GUADALUPE SantanaN, RN, PHN  [facility]

## 2023-05-31 NOTE — TELEPHONE ENCOUNTER
Patient also sent [employer] message 5/30/23 at 9:17pm. Patient also sent [employer] message to his primary care provider, Dr. Wolff. Please refer to that encounter for details.      Melinda Singh RN  Endocrine Care Coordinator  [facility]

## 2023-06-02 ENCOUNTER — HEALTH MAINTENANCE LETTER (OUTPATIENT)
Age: 53
End: 2023-06-02

## 2023-07-06 NOTE — PROGRESS NOTES
Outcome for 07/06/23 1:34 PM: [employer] message sent  Isela Fernandez CMA  Adult Endocrinology  [facility]

## 2023-07-11 ENCOUNTER — OFFICE VISIT (OUTPATIENT)
Dept: ENDOCRINOLOGY | Facility: CLINIC | Age: 53
End: 2023-07-11
Payer: COMMERCIAL

## 2023-07-11 VITALS
SYSTOLIC BLOOD PRESSURE: 134 MMHG | OXYGEN SATURATION: 98 % | HEART RATE: 72 BPM | DIASTOLIC BLOOD PRESSURE: 84 MMHG | BODY MASS INDEX: 31.79 KG/M2 | WEIGHT: 220 LBS

## 2023-07-11 DIAGNOSIS — E78.00 HYPERCHOLESTEREMIA: ICD-10-CM

## 2023-07-11 DIAGNOSIS — E10.9 TYPE I DIABETES MELLITUS, WELL CONTROLLED (H): Primary | ICD-10-CM

## 2023-07-11 LAB — HBA1C MFR BLD: 6.6 % (ref 4.3–?)

## 2023-07-11 PROCEDURE — 83036 HEMOGLOBIN GLYCOSYLATED A1C: CPT | Performed by: INTERNAL MEDICINE

## 2023-07-11 PROCEDURE — 99214 OFFICE O/P EST MOD 30 MIN: CPT | Performed by: INTERNAL MEDICINE

## 2023-07-11 ASSESSMENT — PAIN SCALES - GENERAL: PAINLEVEL: NO PAIN (0)

## 2023-07-11 NOTE — LETTER
7/11/2023         RE: Tl Sands  [address]  [facility] [address]        Dear Colleague,    Thank you for referring your patient, Tl Sands, to the [facility]. Please see a copy of my visit note below.    Outcome for 07/06/23 1:34 PM: [id number] message sent  [facility]  Adult Endocrinology  [facility]             Tl Sands is a 52 year old man seen in follow-up for type 1 diabetes, diagnosed in 2005.  His last visit in our clinic was in 11/2022.    He has not known diabetes complications and his average hemoglobin A1c over the recent years has been around 7 to 7.5.  Most recent A1c was 6.1%, in May 2023.  It was 6.6% today.    The Dexcom G7 reveals an average glucose of 143, with a standard deviation of 61, corresponding to a GMI 6.7%.  66% of the blood sugar numbers are within target, 6% are above 250, 6% are in the mild hypoglycemic range and 3% are below 54.  He has been in the last 2 weeks at the [employer] and he reports higher blood sugar numbers due to poor food choices.  Approximately a month ago, he describes experiencing a severe hypoglycemic episode while sleeping.  He had no recollection how he woke up but he found himself in the kitchen.  He remembers seeing a blood sugar of 40 on the sensor.  Alarms for low-dose set up at 65 and for highs at 200    He continues to have a wide range of the variation of the blood sugar numbers, throughout the day.  The hypoglycemic episodes occurring during the night tend to be more severe, generally happening around 2 or 3 AM.  According to the patient, he comes hungry from work and he has a late dinner around 8 or 9 PM.  At times, his blood sugar is high at bedtime and he takes some extra insulin to bring it down and this might contribute to the nocturnal lows.  As a result of the nocturnal lows, there is rebound morning hyperglycemia.  According to the patient, his blood sugar has a tendency to  spontaneously go up in the morning, in the absence of food intake, but he might have some caffeinated beverages.  Tries to take the insulin prior to eating for most of the meals.  Average Humalog dose for meals is 5 to 8 units.   He was recently hospitalized for left leg cellulitis.  Outside notes and labs reviewed.     Diabetes complications:  Last eye exam 5/2022 -  no DR   No h/o proteinuria.  GFR >90; urine microalbumin negative 11/22  No numbness or tingling sensation   He denies prior episodes of loss of consciousness due to hypoglycemia.  The lowest blood glucose he remembers was 40.  Feels weak and jittery when his BG is low. With severe lows he has sweating.   He does have glucagon at home.  Exercise: Summertime, he plays golf. He has been hunting in the fall.  He has only been taking 1/2 tablets of 10 mg atorvastatin daily.   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   Type 1 diabetes   Tinea pedis   Finger fracture   ED  LBP     PSM:  B/L inguinal hernia repair   B/L arthroscopic knee surgery        Current Outpatient Medications:      aspirin (ASA) 81 MG EC tablet, Take 1 tablet (81 mg) by mouth daily, Disp: 90 tablet, Rfl: 3     atorvastatin (LIPITOR) 10 MG tablet, Take 1 tablet (10 mg) by mouth At Bedtime, Disp: 90 tablet, Rfl: 3     clobetasol (TEMOVATE) 0.05 % external cream, Apply twice daily up to 1 week for scar, take 1 week off, repeat if needed, Disp: 45 g, Rfl: 0     clotrimazole (LOTRIMIN) 1 % external cream, APPLY  CREAM TOPICALLY TWICE DAILY, Disp: 60 g, Rfl: 0     Continuous Blood Gluc Sensor (DEXCOM G6 SENSOR) MISC, USE AS DIRECTED TO  CHECK  GLUCOSE.  REPLACE  WITH  NEW  SENSOR  EVERY  10  DAYS, Disp: 9 each, Rfl: 3     Continuous Blood Gluc Sensor (DEXCOM G7 SENSOR) MISC, 1 each every 10 days, Disp: 9 each, Rfl: 3     Continuous Blood Gluc Transmit (DEXCOM G6 TRANSMITTER) MISC, USE AS DIRECTED AND  REPLACE   WITH  NEW  TRANSMITTER  EVERY  3  MONTHS, Disp: 1 each, Rfl: 3     econazole nitrate 1 % external cream, Apply topically 2 times daily, Disp: 60 g, Rfl: 3     fluorouracil (EFUDEX) 5 % external cream, Apply twice daily for 3 weeks to temples, Disp: 40 g, Rfl: 0     Glucagon (BAQSIMI ONE PACK) 3 MG/DOSE POWD, Spray 3 mg in nostril daily as needed (for severe hypoglycemic episodes), Disp: 1 each, Rfl: 3     insulin glargine (LANTUS SOLOSTAR) 100 UNIT/ML pen, Inject 22 Units Subcutaneous At Bedtime INJECT 22 UNITS SUBCUTANEOUSLY ONCE DAILY. Add 2 units to prime pen., Disp: 21 mL, Rfl: 3     insulin lispro (HUMALOG KWIKPEN) 100 UNIT/ML (1 unit dial) KWIKPEN, INJECT 40 UNITS SUBCUTANEOUSLY ONCE DAILY FOR CARB COVERAGE, CORRECTION, AND PRIMING AS DIRECTED, Disp: 45 mL, Rfl: 3     insulin pen needle (BD PEN NEEDLE MICRO U/F) 32G X 6 MM miscellaneous, Use 4 times daily or as directed., Disp: 400 each, Rfl: 3     meloxicam (MOBIC) 15 MG tablet, Take 1 tablet (15 mg) by mouth daily as needed for moderate pain (4-6) Profile Rx, Disp: 90 tablet, Rfl: 0     mometasone (ELOCON) 0.1 % external solution, Apply small amount to rash on scalp up to once daily as needed for up to 1 week, Disp: 60 mL, Rfl: 11     omeprazole (PRILOSEC) 40 MG DR capsule, Take 1 capsule (40 mg) by mouth daily, Disp: 90 capsule, Rfl: 3     tadalafil (CIALIS) 20 MG tablet, Take 1 tablet (20 mg) by mouth daily as needed (ED), Disp: 20 tablet, Rfl: 3  No current facility-administered medications for this visit.    Facility-Administered Medications Ordered in Other Visits:      bupivacaine (MARCAINE) injection 0.5% (PF), 2 mL, INTRAPLEURAL, Once, Thai Mims DO     methylPREDNISolone (DEPO-MEDROL) injection 80 mg, 80 mg, Intramuscular, Once, Thai Mims,     HABITS:  He does not use tobacco or alcohol.      SOCIAL HISTORY:  He is  and lives with his wife and children in [place]. 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      Family  history  Maternal grandfather - type 1 diabetes. Paternal uncle also has type 1 diabetes. No f/h of thyroid disease. Paternal uncle - colon cancer.    PHYSICAL EXAMINATION:   Wt Readings from Last 10 Encounters:   07/11/23 99.8 kg (220 lb)   01/10/23 99.8 kg (220 lb)   11/23/22 95.3 kg (210 lb)   11/24/21 97.8 kg (215 lb 11.2 oz)   08/17/21 97.6 kg (215 lb 3.2 oz)   01/06/21 101.3 kg (223 lb 4 oz)   09/15/20 95.3 kg (210 lb)   01/09/20 99.3 kg (219 lb)   12/26/19 99.6 kg (219 lb 9.6 oz)   11/22/19 99.2 kg (218 lb 12.8 oz)     BP Readings from Last 6 Encounters:   07/11/23 134/84   01/10/23 118/79   11/23/22 126/77   11/24/21 127/82   08/17/21 138/78   01/06/21 (!) 140/90     /84 (BP Location: Left arm, Patient Position: Sitting, Cuff Size: Adult Large)   Pulse 72   Wt 99.8 kg (220 lb)   SpO2 98%   BMI 31.79 kg/m      General appearance: he is well-developed, well-nourished, and in no distress.    LABORATORY TESTS:   I reviewed prior lab results documented in Epic.   Lab Results   Component Value Date    A1C 6.6 (H) 11/23/2022    A1C 7.8 (H) 02/01/2022    A1C 7.6 (H) 09/10/2021    A1C 6.7 (H) 09/29/2020    A1C 8.0 (A) 08/28/2019    A1C 7.2 08/29/2018    A1C 7.1 (H) 07/26/2017    A1C 7.6 09/13/2016       Hemoglobin   Date Value Ref Range Status   11/22/2019 15.1 13.3 - 17.7 g/dL Final     Hematocrit   Date Value Ref Range Status   11/23/2022 46.5 40.0 - 53.0 % Final   09/29/2020 42.4 40.0 - 53.0 % Final     Cholesterol   Date Value Ref Range Status   11/23/2022 147 <200 mg/dL Final   09/29/2020 141 <200 mg/dL Final     Cholesterol/HDL Ratio   Date Value Ref Range Status   03/17/2015 3.2 0.0 - 5.0 Final     HDL Cholesterol   Date Value Ref Range Status   09/29/2020 54 >39 mg/dL Final     Direct Measure HDL   Date Value Ref Range Status   11/23/2022 55 >=40 mg/dL Final     LDL Cholesterol Calculated   Date Value Ref Range Status   11/23/2022 80 <=100 mg/dL Final   09/29/2020 69 <100 mg/dL Final     Comment:      Desirable:       <100 mg/dl     VLDL-Cholesterol   Date Value Ref Range Status   03/17/2015 23 0 - 30 mg/dL Final     Triglycerides   Date Value Ref Range Status   11/23/2022 59 <150 mg/dL Final   09/29/2020 91 <150 mg/dL Final     Comment:     Non Fasting     Albumin Urine mg/L   Date Value Ref Range Status   11/23/2022 8 mg/L Final   09/29/2020 10 mg/L Final     TSH   Date Value Ref Range Status   11/23/2022 1.56 0.40 - 4.00 mU/L Final   07/26/2017 1.37 0.40 - 4.00 mU/L Final         Last Basic Metabolic Panel:    Sodium   Date Value Ref Range Status   11/23/2022 142 133 - 144 mmol/L Final   09/29/2020 139 133 - 144 mmol/L Final     Potassium   Date Value Ref Range Status   11/23/2022 4.0 3.4 - 5.3 mmol/L Final   09/29/2020 4.2 3.4 - 5.3 mmol/L Final     Chloride   Date Value Ref Range Status   11/23/2022 108 94 - 109 mmol/L Final   09/29/2020 107 94 - 109 mmol/L Final     Calcium   Date Value Ref Range Status   11/23/2022 8.9 8.5 - 10.1 mg/dL Final   09/29/2020 8.4 (L) 8.5 - 10.1 mg/dL Final     Carbon Dioxide   Date Value Ref Range Status   09/29/2020 29 20 - 32 mmol/L Final     Carbon Dioxide (CO2)   Date Value Ref Range Status   11/23/2022 28 20 - 32 mmol/L Final     Urea Nitrogen   Date Value Ref Range Status   11/23/2022 18 7 - 30 mg/dL Final   09/29/2020 21 7 - 30 mg/dL Final     Creatinine   Date Value Ref Range Status   11/23/2022 0.95 0.66 - 1.25 mg/dL Final   09/29/2020 0.81 0.66 - 1.25 mg/dL Final     GFR Estimate   Date Value Ref Range Status   11/23/2022 >90 >60 mL/min/1.73m2 Final     Comment:     Effective December 21, 2021 eGFRcr in adults is calculated using the 2021 CKD-EPI creatinine equation which includes age and gender (Latasha christian al., NEJ, DOI: 10.1056/JFNGbc0703175)   09/29/2020 >90 >60 mL/min/[1.73_m2] Final     Comment:     Non  GFR Calc  Starting 12/18/2018, serum creatinine based estimated GFR (eGFR) will be   calculated using the Chronic Kidney Disease Epidemiology  Collaboration   (CKD-EPI) equation.       Glucose   Date Value Ref Range Status   11/23/2022 127 (H) 70 - 99 mg/dL Final   09/29/2020 226 (H) 70 - 99 mg/dL Final     Comment:     Non Fasting       AST   Date Value Ref Range Status   11/23/2022 17 0 - 45 U/L Final   09/29/2020 18 0 - 45 U/L Final     ALT   Date Value Ref Range Status   11/23/2022 31 0 - 70 U/L Final   09/29/2020 31 0 - 70 U/L Final     Albumin Urine mg/g Cr   Date Value Ref Range Status   11/23/2022 3.96 0.00 - 17.00 mg/g Cr Final   09/29/2020 4.15 0 - 17 mg/g Cr Final     Assessment and plan:    1.  Type 1 diabetes mellitus, suboptimally controlled, not known to be complicated by microvascular disease. 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  He maintains a good A1c at the expense of a wide range of variation of the blood sugar.   We discussed about considering an OmniPod insulin pump, which is going to help stabilize his blood sugar numbers.  Meantime, I made the following recommendations:  Dexcom alarms set up in the clinic to 70 and 250  Try to take insulin prior to eating, ideally 15 to 20 minutes before   Avoid over correcting the highs as this can result in later hypoglycemic episodes. The correction scale should not be used more often than 4 hours apart.    Record the carbohydrate intake and the insulin dose administered using the Dexcom esther, and have the esther downloaded for our review in 2-3 weeks.  Schedule an eye appointment  Schedule labs prior to the next visit    2. Hypercholesterolemia  Follow-up lipid panel at his next visit    Orders Placed This Encounter   Procedures     Continuous Glucose Monitoring >=72 hours PHYS INTER     Comprehensive metabolic panel     Lipid panel reflex to direct LDL Fasting     Hematocrit     Albumin Random Urine Quantitative with Creat Ratio     Hemoglobin A1c     Hemoglobin A1c POCT      33 minutes spent on the date of the encounter doing chart review, history and exam, documentation and further  activities as noted above.                 Again, thank you for allowing me to participate in the care of your patient.        Sincerely,        Rupali Resendiz MD

## 2023-07-11 NOTE — NURSING NOTE
Tl Sands's goals for this visit include: NONE  He requests these members of his care team be copied on today's visit information: YES    PCP: Jakub Wolff    Referring Provider:  No referring provider defined for this encounter.    /84 (BP Location: Left arm, Patient Position: Sitting, Cuff Size: Adult Large)   Pulse 72   Wt 99.8 kg (220 lb)   SpO2 98%   BMI 31.79 kg/m      Do you need any medication refills at today's visit? NONE    Cj Lopez, EMT    [facility]-Department of Endocrinology  Diabetes Educators:   Anju Barrios, RN and Aspen Gill RN  Clinic Nurse: JASPER Lawrence  CMA's: Isela Sandhu and Madhu   EMT: Cj  Scheduling/Clinic phone number : [phone]   Clinic Fax: [phone]  On-Call Endocrine at [facility] (after hours/weekends): [phone] option 4    Please call the number below to schedule your labs.  [facility]    General [phone]   [facility] [phone]   [place] [phone]   [facility]  [phone]   [facility] [phone]   [place] [phone]   [facility]) [phone]   [facility] Walk-In Only   [place] [phone]   [place] [phone]   [place] [phone]   [place] [phone]     Please reach out to the following centers to schedule your imaging appointment:  Imaging (DEXA, CT, MRI, XRAY)    [facility] ([facility], [facility], [place]) [phone]   [facility] ([place], [place]) [phone]   [facility] ([facility]) [phone]   [facility] ([place]) [phone]     Appointment Reminders:  * Please bring meter with for staff to download  * If you are due ONLY for an A1C, it is scheduled with the nurse and will be done in clinic. You do not need to schedule a lab appointment. Fasting is not required for an A1C.  * Refill request should be submitted to your pharmacy. They will contact clinic for approval.

## 2023-07-11 NOTE — PROGRESS NOTES
Tl Sands is a 52 year old man seen in follow-up for type 1 diabetes, diagnosed in 2005.  His last visit in our clinic was in 11/2022.    He has not known diabetes complications and his average hemoglobin A1c over the recent years has been around 7 to 7.5.  Most recent A1c was 6.1%, in May 2023.  It was 6.6% today.    The Dexcom G7 reveals an average glucose of 143, with a standard deviation of 61, corresponding to a GMI 6.7%.  66% of the blood sugar numbers are within target, 6% are above 250, 6% are in the mild hypoglycemic range and 3% are below 54.  [place] and he reports higher blood sugar numbers due to poor food choices.  Approximately a month ago, he describes experiencing a severe hypoglycemic episode while sleeping.  He had no recollection how he woke up but he found himself in the kitchen.  He remembers seeing a blood sugar of 40 on the sensor.  Alarms for low-dose set up at 65 and for highs at 200    He continues to have a wide range of the variation of the blood sugar numbers, throughout the day.  The hypoglycemic episodes occurring during the night tend to be more severe, generally happening around 2 or 3 AM.  According to the patient, he comes hungry from work and he has a late dinner around 8 or 9 PM.  At times, his blood sugar is high at bedtime and he takes some extra insulin to bring it down and this might contribute to the nocturnal lows.  As a result of the nocturnal lows, there is rebound morning hyperglycemia.  According to the patient, his blood sugar has a tendency to spontaneously go up in the morning, in the absence of food intake, but he might have some caffeinated beverages.  Tries to take the insulin prior to eating for most of the meals.  Average Humalog dose for meals is 5 to 8 units.   He was recently hospitalized for left leg cellulitis.  Outside notes and labs reviewed.     Diabetes complications:  Last eye exam 5/2022 -  no DR Goodwin h/o  proteinuria.  GFR >90; urine microalbumin negative 11/22  No numbness or tingling sensation   He denies prior episodes of loss of consciousness due to hypoglycemia.  The lowest blood glucose he remembers was 40.  Feels weak and jittery when his BG is low. With severe lows he has sweating.   He does have glucagon at home.  Exercise: Summertime, he plays golf. He has been hunting in the fall.  He has only been taking 1/2 tablets of 10 mg atorvastatin daily.   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   Type 1 diabetes   Tinea pedis   Finger fracture   ED  LBP     PSM:  B/L inguinal hernia repair   B/L arthroscopic knee surgery        Current Outpatient Medications:      aspirin (ASA) 81 MG EC tablet, Take 1 tablet (81 mg) by mouth daily, Disp: 90 tablet, Rfl: 3     atorvastatin (LIPITOR) 10 MG tablet, Take 1 tablet (10 mg) by mouth At Bedtime, Disp: 90 tablet, Rfl: 3     clobetasol (TEMOVATE) 0.05 % external cream, Apply twice daily up to 1 week for scar, take 1 week off, repeat if needed, Disp: 45 g, Rfl: 0     clotrimazole (LOTRIMIN) 1 % external cream, APPLY  CREAM TOPICALLY TWICE DAILY, Disp: 60 g, Rfl: 0     Continuous Blood Gluc Sensor (DEXCOM G6 SENSOR) MISC, USE AS DIRECTED TO  CHECK  GLUCOSE.  REPLACE  WITH  NEW  SENSOR  EVERY  10  DAYS, Disp: 9 each, Rfl: 3     Continuous Blood Gluc Sensor (DEXCOM G7 SENSOR) MISC, 1 each every 10 days, Disp: 9 each, Rfl: 3     Continuous Blood Gluc Transmit (DEXCOM G6 TRANSMITTER) MISC, USE AS DIRECTED AND  REPLACE  WITH  NEW  TRANSMITTER  EVERY  3  MONTHS, Disp: 1 each, Rfl: 3     econazole nitrate 1 % external cream, Apply topically 2 times daily, Disp: 60 g, Rfl: 3     fluorouracil (EFUDEX) 5 % external cream, Apply twice daily for 3 weeks to temples, Disp: 40 g, Rfl: 0     Glucagon (BAQSIMI ONE PACK) 3 MG/DOSE POWD, Spray 3 mg in nostril daily as needed (for severe hypoglycemic episodes),  Disp: 1 each, Rfl: 3     insulin glargine (LANTUS SOLOSTAR) 100 UNIT/ML pen, Inject 22 Units Subcutaneous At Bedtime INJECT 22 UNITS SUBCUTANEOUSLY ONCE DAILY. Add 2 units to prime pen., Disp: 21 mL, Rfl: 3     insulin lispro (HUMALOG KWIKPEN) 100 UNIT/ML (1 unit dial) KWIKPEN, INJECT 40 UNITS SUBCUTANEOUSLY ONCE DAILY FOR CARB COVERAGE, CORRECTION, AND PRIMING AS DIRECTED, Disp: 45 mL, Rfl: 3     insulin pen needle (BD PEN NEEDLE MICRO U/F) 32G X 6 MM miscellaneous, Use 4 times daily or as directed., Disp: 400 each, Rfl: 3     meloxicam (MOBIC) 15 MG tablet, Take 1 tablet (15 mg) by mouth daily as needed for moderate pain (4-6) Profile Rx, Disp: 90 tablet, Rfl: 0     mometasone (ELOCON) 0.1 % external solution, Apply small amount to rash on scalp up to once daily as needed for up to 1 week, Disp: 60 mL, Rfl: 11     omeprazole (PRILOSEC) 40 MG DR capsule, Take 1 capsule (40 mg) by mouth daily, Disp: 90 capsule, Rfl: 3     tadalafil (CIALIS) 20 MG tablet, Take 1 tablet (20 mg) by mouth daily as needed (ED), Disp: 20 tablet, Rfl: 3  No current facility-administered medications for this visit.    Facility-Administered Medications Ordered in Other Visits:      bupivacaine (MARCAINE) injection 0.5% (PF), 2 mL, INTRAPLEURAL, Once, Thai Mims DO     methylPREDNISolone (DEPO-MEDROL) injection 80 mg, 80 mg, Intramuscular, Once, Thai Mims DO    HABITS:  He does not use tobacco or alcohol.      SOCIAL HISTORY:  He is  and lives with his wife and children in [place]. 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      Family history  Maternal grandfather - type 1 diabetes. Paternal uncle also has type 1 diabetes. No f/h of thyroid disease. Paternal uncle - colon cancer.    PHYSICAL EXAMINATION:   Wt Readings from Last 10 Encounters:   07/11/23 99.8 kg (220 lb)   01/10/23 99.8 kg (220 lb)   11/23/22 95.3 kg (210 lb)   11/24/21 97.8 kg (215 lb 11.2 oz)   08/17/21 97.6 kg (215 lb 3.2 oz)   01/06/21 101.3 kg  (223 lb 4 oz)   09/15/20 95.3 kg (210 lb)   01/09/20 99.3 kg (219 lb)   12/26/19 99.6 kg (219 lb 9.6 oz)   11/22/19 99.2 kg (218 lb 12.8 oz)     BP Readings from Last 6 Encounters:   07/11/23 134/84   01/10/23 118/79   11/23/22 126/77   11/24/21 127/82   08/17/21 138/78   01/06/21 (!) 140/90     /84 (BP Location: Left arm, Patient Position: Sitting, Cuff Size: Adult Large)   Pulse 72   Wt 99.8 kg (220 lb)   SpO2 98%   BMI 31.79 kg/m      General appearance: he is well-developed, well-nourished, and in no distress.    LABORATORY TESTS:   I reviewed prior lab results documented in Epic.   Lab Results   Component Value Date    A1C 6.6 (H) 11/23/2022    A1C 7.8 (H) 02/01/2022    A1C 7.6 (H) 09/10/2021    A1C 6.7 (H) 09/29/2020    A1C 8.0 (A) 08/28/2019    A1C 7.2 08/29/2018    A1C 7.1 (H) 07/26/2017    A1C 7.6 09/13/2016       Hemoglobin   Date Value Ref Range Status   11/22/2019 15.1 13.3 - 17.7 g/dL Final     Hematocrit   Date Value Ref Range Status   11/23/2022 46.5 40.0 - 53.0 % Final   09/29/2020 42.4 40.0 - 53.0 % Final     Cholesterol   Date Value Ref Range Status   11/23/2022 147 <200 mg/dL Final   09/29/2020 141 <200 mg/dL Final     Cholesterol/HDL Ratio   Date Value Ref Range Status   03/17/2015 3.2 0.0 - 5.0 Final     HDL Cholesterol   Date Value Ref Range Status   09/29/2020 54 >39 mg/dL Final     Direct Measure HDL   Date Value Ref Range Status   11/23/2022 55 >=40 mg/dL Final     LDL Cholesterol Calculated   Date Value Ref Range Status   11/23/2022 80 <=100 mg/dL Final   09/29/2020 69 <100 mg/dL Final     Comment:     Desirable:       <100 mg/dl     VLDL-Cholesterol   Date Value Ref Range Status   03/17/2015 23 0 - 30 mg/dL Final     Triglycerides   Date Value Ref Range Status   11/23/2022 59 <150 mg/dL Final   09/29/2020 91 <150 mg/dL Final     Comment:     Non Fasting     Albumin Urine mg/L   Date Value Ref Range Status   11/23/2022 8 mg/L Final   09/29/2020 10 mg/L Final     TSH   Date Value  Ref Range Status   11/23/2022 1.56 0.40 - 4.00 mU/L Final   07/26/2017 1.37 0.40 - 4.00 mU/L Final         Last Basic Metabolic Panel:    Sodium   Date Value Ref Range Status   11/23/2022 142 133 - 144 mmol/L Final   09/29/2020 139 133 - 144 mmol/L Final     Potassium   Date Value Ref Range Status   11/23/2022 4.0 3.4 - 5.3 mmol/L Final   09/29/2020 4.2 3.4 - 5.3 mmol/L Final     Chloride   Date Value Ref Range Status   11/23/2022 108 94 - 109 mmol/L Final   09/29/2020 107 94 - 109 mmol/L Final     Calcium   Date Value Ref Range Status   11/23/2022 8.9 8.5 - 10.1 mg/dL Final   09/29/2020 8.4 (L) 8.5 - 10.1 mg/dL Final     Carbon Dioxide   Date Value Ref Range Status   09/29/2020 29 20 - 32 mmol/L Final     Carbon Dioxide (CO2)   Date Value Ref Range Status   11/23/2022 28 20 - 32 mmol/L Final     Urea Nitrogen   Date Value Ref Range Status   11/23/2022 18 7 - 30 mg/dL Final   09/29/2020 21 7 - 30 mg/dL Final     Creatinine   Date Value Ref Range Status   11/23/2022 0.95 0.66 - 1.25 mg/dL Final   09/29/2020 0.81 0.66 - 1.25 mg/dL Final     GFR Estimate   Date Value Ref Range Status   11/23/2022 >90 >60 mL/min/1.73m2 Final     Comment:     Effective December 21, 2021 eGFRcr in adults is calculated using the 2021 CKD-EPI creatinine equation which includes age and gender (Latasha et al., NEJM, DOI: 10.1056/VCAWfh2207823)   09/29/2020 >90 >60 mL/min/[1.73_m2] Final     Comment:     Non  GFR Calc  Starting 12/18/2018, serum creatinine based estimated GFR (eGFR) will be   calculated using the Chronic Kidney Disease Epidemiology Collaboration   (CKD-EPI) equation.       Glucose   Date Value Ref Range Status   11/23/2022 127 (H) 70 - 99 mg/dL Final   09/29/2020 226 (H) 70 - 99 mg/dL Final     Comment:     Non Fasting       AST   Date Value Ref Range Status   11/23/2022 17 0 - 45 U/L Final   09/29/2020 18 0 - 45 U/L Final     ALT   Date Value Ref Range Status   11/23/2022 31 0 - 70 U/L Final   09/29/2020 31 0 -  70 U/L Final     Albumin Urine mg/g Cr   Date Value Ref Range Status   11/23/2022 3.96 0.00 - 17.00 mg/g Cr Final   09/29/2020 4.15 0 - 17 mg/g Cr Final     Assessment and plan:    1.  Type 1 diabetes mellitus, suboptimally controlled, not known to be complicated by microvascular disease. 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  He maintains a good A1c at the expense of a wide range of variation of the blood sugar.   We discussed about considering an OmniPod insulin pump, which is going to help stabilize his blood sugar numbers.  Meantime, I made the following recommendations:  Dexcom alarms set up in the clinic to 70 and 250  Try to take insulin prior to eating, ideally 15 to 20 minutes before   Avoid over correcting the highs as this can result in later hypoglycemic episodes. The correction scale should not be used more often than 4 hours apart.    Record the carbohydrate intake and the insulin dose administered using the Dexcom esther, and have the esther downloaded for our review in 2-3 weeks.  Schedule an eye appointment  Schedule labs prior to the next visit    2. Hypercholesterolemia  Follow-up lipid panel at his next visit    Orders Placed This Encounter   Procedures     Continuous Glucose Monitoring >=72 hours PHYS INTER     Comprehensive metabolic panel     Lipid panel reflex to direct LDL Fasting     Hematocrit     Albumin Random Urine Quantitative with Creat Ratio     Hemoglobin A1c     Hemoglobin A1c POCT      33 minutes spent on the date of the encounter doing chart review, history and exam, documentation and further activities as noted above.

## 2023-08-28 DIAGNOSIS — E10.9 TYPE I DIABETES MELLITUS, WELL CONTROLLED (H): ICD-10-CM

## 2023-08-28 RX ORDER — INSULIN LISPRO 100 [IU]/ML
INJECTION, SOLUTION INTRAVENOUS; SUBCUTANEOUS
Qty: 45 ML | Refills: 3 | Status: SHIPPED | OUTPATIENT
Start: 2023-08-28 | End: 2024-01-17

## 2023-08-28 NOTE — TELEPHONE ENCOUNTER
[facility] Call Center    Phone Message    May a detailed message be left on voicemail: yes     Reason for Call: Medication Refill Request    Has the patient contacted the pharmacy for the refill? Yes   Name of medication being requested: insulin lispro (HUMALOG KWIKPEN) 100 UNIT/ML (1 unit dial) KWIKPEN [[id number]]  Provider who prescribed the medication: Dr Resendiz  Pharmacy: [facility] [address] [place] [address] [address]  Date medication is needed: asap - patient is traveling and forgot insulin at home.  Needs ASAP!       Action Taken: Other: endo    Travel Screening: Not Applicable

## 2023-08-28 NOTE — TELEPHONE ENCOUNTER
insulin lispro (HUMALOG KWIKPEN) 100 UNIT/ML (1 unit dial) KWIKPEN     Last Written Prescription Date:   4/18/2023  Last Fill Quantity: 45,   # refills: 3  Last Office Visit :  7/11/2023  Future Office visit:  1/17/2024    Routing refill request to provider for review/approval because:  Drug not on the [facility], [facility] or [facility] refill protocol or controlled substance    Rupali Vergara RN  Central Triage Red Flags/Med Refills

## 2023-10-05 ENCOUNTER — TRANSFERRED RECORDS (OUTPATIENT)
Dept: HEALTH INFORMATION MANAGEMENT | Facility: CLINIC | Age: 53
End: 2023-10-05
Payer: COMMERCIAL

## 2023-10-05 LAB — RETINOPATHY: NEGATIVE

## 2023-11-17 DIAGNOSIS — E10.9 TYPE 1 DIABETES MELLITUS WITHOUT COMPLICATION (H): ICD-10-CM

## 2023-11-19 NOTE — TELEPHONE ENCOUNTER
insulin glargine (LANTUS SOLOSTAR) 100 UNIT/ML pen   21 mL 3 11/23/2022 7/11/2023  [facility]      Rupali Resendiz MD  Endocrinology, Diabetes, and Metabolism    Routed because:  endo triage to fill

## 2023-11-20 RX ORDER — INSULIN GLARGINE-YFGN 100 [IU]/ML
INJECTION, SOLUTION SUBCUTANEOUS
Qty: 21 ML | Refills: 2 | Status: SHIPPED | OUTPATIENT
Start: 2023-11-20 | End: 2024-01-17

## 2023-12-12 DIAGNOSIS — E10.9 TYPE 1 DIABETES MELLITUS WITHOUT COMPLICATION (H): ICD-10-CM

## 2023-12-18 RX ORDER — PEN NEEDLE, DIABETIC 32 GX 1/4"
NEEDLE, DISPOSABLE MISCELLANEOUS
Qty: 400 EACH | Refills: 3 | Status: SHIPPED | OUTPATIENT
Start: 2023-12-18

## 2023-12-18 RX ORDER — ATORVASTATIN CALCIUM 10 MG/1
10 TABLET, FILM COATED ORAL AT BEDTIME
Qty: 90 TABLET | Refills: 0 | Status: SHIPPED | OUTPATIENT
Start: 2023-12-18 | End: 2024-01-17

## 2023-12-18 NOTE — TELEPHONE ENCOUNTER
atorvastatin (LIPITOR) 10 MG tablet 90 tablet 3 11/23/2022     LDL Cholesterol Calculated   Date Value Ref Range Status   11/23/2022 80 <=100 mg/dL Final   09/29/2020 69 <100 mg/dL Final     Comment:     Desirable:       <100 mg/dl     insulin pen needle (BD PEN NEEDLE MICRO U/F) 32G X 6 MM miscellaneous 400 each 3 11/23/2022         Last Office Visit: 7/11/23  Future Office visit:   1/17/24    Routing refill request to provider for review/approval because:  Overdue LDL    Leona Jim RN

## 2024-01-04 NOTE — Clinical Note
Please check the code Yesenia. Thank you.  Please check the code Yesenia. Thank you.    
IV discontinued, cath removed intact

## 2024-01-17 ENCOUNTER — LAB (OUTPATIENT)
Dept: LAB | Facility: CLINIC | Age: 54
End: 2024-01-17
Payer: COMMERCIAL

## 2024-01-17 ENCOUNTER — OFFICE VISIT (OUTPATIENT)
Dept: ENDOCRINOLOGY | Facility: CLINIC | Age: 54
End: 2024-01-17
Payer: COMMERCIAL

## 2024-01-17 VITALS
RESPIRATION RATE: 16 BRPM | BODY MASS INDEX: 31.5 KG/M2 | WEIGHT: 218 LBS | OXYGEN SATURATION: 99 % | DIASTOLIC BLOOD PRESSURE: 83 MMHG | HEART RATE: 67 BPM | SYSTOLIC BLOOD PRESSURE: 131 MMHG

## 2024-01-17 DIAGNOSIS — E10.9 TYPE 1 DIABETES MELLITUS WITHOUT COMPLICATION (H): Primary | ICD-10-CM

## 2024-01-17 DIAGNOSIS — E78.00 HYPERCHOLESTEREMIA: ICD-10-CM

## 2024-01-17 DIAGNOSIS — N52.9 ERECTILE DYSFUNCTION, UNSPECIFIED ERECTILE DYSFUNCTION TYPE: ICD-10-CM

## 2024-01-17 DIAGNOSIS — E10.9 TYPE I DIABETES MELLITUS, WELL CONTROLLED (H): ICD-10-CM

## 2024-01-17 LAB
ALBUMIN SERPL BCG-MCNC: 4.3 G/DL (ref 3.5–5.2)
ALP SERPL-CCNC: 68 U/L (ref 40–150)
ALT SERPL W P-5'-P-CCNC: 24 U/L (ref 0–70)
ANION GAP SERPL CALCULATED.3IONS-SCNC: 10 MMOL/L (ref 7–15)
AST SERPL W P-5'-P-CCNC: 27 U/L (ref 0–45)
BILIRUB SERPL-MCNC: 0.7 MG/DL
BUN SERPL-MCNC: 19.9 MG/DL (ref 6–20)
CALCIUM SERPL-MCNC: 9.7 MG/DL (ref 8.6–10)
CHLORIDE SERPL-SCNC: 103 MMOL/L (ref 98–107)
CHOLEST SERPL-MCNC: 161 MG/DL
CREAT SERPL-MCNC: 1.07 MG/DL (ref 0.67–1.17)
DEPRECATED HCO3 PLAS-SCNC: 28 MMOL/L (ref 22–29)
EGFRCR SERPLBLD CKD-EPI 2021: 82 ML/MIN/1.73M2
FASTING STATUS PATIENT QL REPORTED: YES
GLUCOSE SERPL-MCNC: 147 MG/DL (ref 70–99)
HBA1C MFR BLD: 6.4 % (ref 0–5.6)
HCT VFR BLD AUTO: 45.7 % (ref 40–53)
HDLC SERPL-MCNC: 56 MG/DL
LDLC SERPL CALC-MCNC: 94 MG/DL
NONHDLC SERPL-MCNC: 105 MG/DL
POTASSIUM SERPL-SCNC: 4.1 MMOL/L (ref 3.4–5.3)
PROT SERPL-MCNC: 7 G/DL (ref 6.4–8.3)
SODIUM SERPL-SCNC: 141 MMOL/L (ref 135–145)
TRIGL SERPL-MCNC: 55 MG/DL

## 2024-01-17 PROCEDURE — 80053 COMPREHEN METABOLIC PANEL: CPT

## 2024-01-17 PROCEDURE — 36415 COLL VENOUS BLD VENIPUNCTURE: CPT

## 2024-01-17 PROCEDURE — 82043 UR ALBUMIN QUANTITATIVE: CPT

## 2024-01-17 PROCEDURE — 85014 HEMATOCRIT: CPT

## 2024-01-17 PROCEDURE — 83036 HEMOGLOBIN GLYCOSYLATED A1C: CPT

## 2024-01-17 PROCEDURE — 82570 ASSAY OF URINE CREATININE: CPT

## 2024-01-17 PROCEDURE — 80061 LIPID PANEL: CPT

## 2024-01-17 PROCEDURE — 95251 CONT GLUC MNTR ANALYSIS I&R: CPT | Performed by: INTERNAL MEDICINE

## 2024-01-17 PROCEDURE — 99214 OFFICE O/P EST MOD 30 MIN: CPT | Mod: 25 | Performed by: INTERNAL MEDICINE

## 2024-01-17 RX ORDER — INSULIN GLARGINE-YFGN 100 [IU]/ML
22 INJECTION, SOLUTION SUBCUTANEOUS DAILY
Qty: 30 ML | Refills: 3 | Status: SHIPPED | OUTPATIENT
Start: 2024-01-17

## 2024-01-17 RX ORDER — GLUCAGON 3 MG/1
3 POWDER NASAL DAILY PRN
Qty: 1 EACH | Refills: 3 | Status: SHIPPED | OUTPATIENT
Start: 2024-01-17

## 2024-01-17 RX ORDER — INSULIN LISPRO 100 [IU]/ML
INJECTION, SOLUTION INTRAVENOUS; SUBCUTANEOUS
Qty: 45 ML | Refills: 3 | Status: SHIPPED | OUTPATIENT
Start: 2024-01-17 | End: 2024-07-08

## 2024-01-17 RX ORDER — ATORVASTATIN CALCIUM 10 MG/1
10 TABLET, FILM COATED ORAL AT BEDTIME
Qty: 90 TABLET | Refills: 3 | Status: SHIPPED | OUTPATIENT
Start: 2024-01-17

## 2024-01-17 RX ORDER — TADALAFIL 20 MG/1
20 TABLET ORAL DAILY PRN
Qty: 20 TABLET | Refills: 3 | Status: SHIPPED | OUTPATIENT
Start: 2024-01-17

## 2024-01-17 NOTE — LETTER
1/17/2024         RE: Tl Sands  [address]  [facility] [address]        Dear Colleague,    Thank you for referring your patient, Tl Sands, to the [facility]. Please see a copy of my visit note below.    Assessment and plan:    1.  Type 1 diabetes mellitus, well controlled, not known to be complicated by microvascular disease. 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   We discussed about considering an insulin pump in order to decrease the variability of the blood sugar numbers.  The patient appears open to consider Omnipod.  He is concerned about the pod falling off and this can be addressed with extra adhesive patches.  If he decides to transition to an insulin pump, I recommended to schedule an appointment with the diabetes educator.   F/up labs at his next visit.     2. Hypercholesterolemia  Recommended to increase the dose of atorvastatin to 10 mg daily.    Orders Placed This Encounter   Procedures     GLUCOSE MONITOR, 72 HOUR, PHYS INTERP     Comprehensive metabolic panel     Lipid panel reflex to direct LDL Fasting     Hematocrit     Albumin Random Urine Quantitative with Creat Ratio     Hemoglobin A1c     =========================================================================================    Tl Sands is a 53 year old man seen in follow-up for type 1 diabetes, diagnosed in 2005.  His last visit in our clinic was in 7/2023.    He has not known diabetes complications and his average hemoglobin A1c over the recent years has been around 7 to 7.5.  Most recent A1c was 6.6%, in July 2023.  It was 6.4% today.    The Dexcom sensor reveals an average glucose of 139, with a coefficient of variation of 40%, corresponding to a GMI of 6.6%.  The time CGM is active is 81%.  On the sensor, 71% of the glucose numbers are within target of , 3% are above 250, 5% are in the mild hypoglycemic range and 2% are below 54.  The sensor reveals a few  hypoglycemic episodes occurring weekly, at different times during the day and night, with no identifiable pattern.  Most of the hypoglycemic episodes are postprandial. Alarms for low-dose set up at 65 and for highs at 200.  Not always administering insulin 10 minutes prior to eating, especially while at work.  He has been exercising regularly: Kickboxing, cardio exercises.  Denies experiencing lows while exercising.    Current insulin regimen:  22 unit(s) semglees in am   1 unit Humalog per 10 g carbohydrates  Correction 1 U per 25 above 120   Average Humalog dose for meals is 5 to 8 units.      Diabetes complications:  Last eye exam 10/2023 -  no  Note reviewed.   No h/o proteinuria.  GFR >90; urine microalbumin negative 11/22  No numbness or tingling sensation   He denies prior episodes of loss of consciousness due to hypoglycemia.  The lowest blood glucose he remembers was 40.  Feels weak and jittery when his BG is low. With severe lows he has sweating.   He does have glucagon at home.  He has only been taking 1/2 tablets of 10 mg atorvastatin daily.   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   Type 1 diabetes   Tinea pedis   Finger fracture   ED  LBP     PSM:  B/L inguinal hernia repair   B/L arthroscopic knee surgery        Current Outpatient Medications:      aspirin (ASA) 81 MG EC tablet, Take 1 tablet (81 mg) by mouth daily, Disp: 90 tablet, Rfl: 3     atorvastatin (LIPITOR) 10 MG tablet, Take 1 tablet (10 mg) by mouth at bedtime, Disp: 90 tablet, Rfl: 0     clobetasol (TEMOVATE) 0.05 % external cream, Apply twice daily up to 1 week for scar, take 1 week off, repeat if needed, Disp: 45 g, Rfl: 0     clotrimazole (LOTRIMIN) 1 % external cream, APPLY  CREAM TOPICALLY TWICE DAILY, Disp: 60 g, Rfl: 0     Continuous Blood Gluc Sensor (DEXCOM G6 SENSOR) MISC, USE AS DIRECTED TO  CHECK  GLUCOSE.  REPLACE  WITH  NEW  SENSOR  EVERY  10   DAYS, Disp: 9 each, Rfl: 3     Continuous Blood Gluc Sensor (DEXCOM G7 SENSOR) MISC, 1 each every 10 days, Disp: 9 each, Rfl: 3     Continuous Blood Gluc Transmit (DEXCOM G6 TRANSMITTER) MISC, USE AS DIRECTED AND  REPLACE  WITH  NEW  TRANSMITTER  EVERY  3  MONTHS, Disp: 1 each, Rfl: 3     econazole nitrate 1 % external cream, Apply topically 2 times daily, Disp: 60 g, Rfl: 3     fluorouracil (EFUDEX) 5 % external cream, Apply twice daily for 3 weeks to temples, Disp: 40 g, Rfl: 0     Glucagon (BAQSIMI ONE PACK) 3 MG/DOSE POWD, Spray 3 mg in nostril daily as needed (for severe hypoglycemic episodes), Disp: 1 each, Rfl: 3     insulin lispro (HUMALOG KWIKPEN) 100 UNIT/ML (1 unit dial) KWIKPEN, INJECT 40 UNITS SUBCUTANEOUSLY ONCE DAILY FOR CARB COVERAGE, CORRECTION, AND PRIMING AS DIRECTED, Disp: 45 mL, Rfl: 3     insulin pen needle (BD PEN NEEDLE MICRO U/F) 32G X 6 MM miscellaneous, USE 1 EACH SUBCUTANEOUSLY 4 TIMES DAILY AS DIRECTED, Disp: 400 each, Rfl: 3     meloxicam (MOBIC) 15 MG tablet, Take 1 tablet (15 mg) by mouth daily as needed for moderate pain (4-6) Profile Rx, Disp: 90 tablet, Rfl: 0     mometasone (ELOCON) 0.1 % external solution, Apply small amount to rash on scalp up to once daily as needed for up to 1 week, Disp: 60 mL, Rfl: 11     omeprazole (PRILOSEC) 40 MG DR capsule, Take 1 capsule (40 mg) by mouth daily, Disp: 90 capsule, Rfl: 3     SEMGLEE, YFGN, 100 UNIT/ML SOPN, INJECT 22 UNITS SUBCUTANEOUSLY AT BEDTIME **PRIME  WITH  2  UNITS  EACH  TIME.**, Disp: 21 mL, Rfl: 2     tadalafil (CIALIS) 20 MG tablet, Take 1 tablet (20 mg) by mouth daily as needed (ED), Disp: 20 tablet, Rfl: 3  No current facility-administered medications for this visit.    Facility-Administered Medications Ordered in Other Visits:      bupivacaine (MARCAINE) injection 0.5% (PF), 2 mL, INTRAPLEURAL, Once, Thai Mims,      methylPREDNISolone (DEPO-MEDROL) injection 80 mg, 80 mg, Intramuscular, Once, Thai Mims,  DO    HABITS:  He does not use tobacco or alcohol.      SOCIAL HISTORY:  He is  and lives with his wife and children in [place]. 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      Family history  Maternal grandfather - type 1 diabetes. Paternal uncle also has type 1 diabetes. No f/h of thyroid disease. Paternal uncle - colon cancer.    PHYSICAL EXAMINATION:   Wt Readings from Last 10 Encounters:   01/17/24 98.9 kg (218 lb)   07/11/23 99.8 kg (220 lb)   01/10/23 99.8 kg (220 lb)   11/23/22 95.3 kg (210 lb)   11/24/21 97.8 kg (215 lb 11.2 oz)   08/17/21 97.6 kg (215 lb 3.2 oz)   01/06/21 101.3 kg (223 lb 4 oz)   09/15/20 95.3 kg (210 lb)   01/09/20 99.3 kg (219 lb)   12/26/19 99.6 kg (219 lb 9.6 oz)     BP Readings from Last 6 Encounters:   07/11/23 134/84   01/10/23 118/79   11/23/22 126/77   11/24/21 127/82   08/17/21 138/78   01/06/21 (!) 140/90     /83 (BP Location: Left arm, Patient Position: Sitting, Cuff Size: Adult Regular)   Pulse 67   Resp 16   Wt 98.9 kg (218 lb)   SpO2 99%   BMI 31.50 kg/m      General appearance: he is well-developed, well-nourished, and in no distress.  Eyes: conjunctivae and extraocular motions are normal. Pupils are equal, round, and reactive to light. No lid lag, no stare.  HENT: oropharynx clear and moist; neck no JVD, no bruits, no thyromegaly, no palpable nodules  Cardiovascular: regular rhythm, no murmurs, distal pulses palpable, no edema  Respiratory: chest clear, no rales, no rhonchi  Musculoskeletal: normal tone and strength   Neurologic: reflexes normal and symmetric, no resting tremor  Psychiatric: affect and judgment normal   Skin: Mild dystrophy at the site of prior abdominal insulin injections  Feet: sensation intact to monofilament testing    LABORATORY TESTS:   I reviewed prior lab results documented in Epic.   Lab Results   Component Value Date    A1C 6.6 (H) 11/23/2022    A1C 7.8 (H) 02/01/2022    A1C 7.6 (H) 09/10/2021    A1C 6.7 (H) 09/29/2020     A1C 8.0 (A) 08/28/2019    A1C 7.2 08/29/2018    A1C 7.1 (H) 07/26/2017    A1C 7.6 09/13/2016    HEMOGLOBINA1 6.6 (A) 07/11/2023       Hemoglobin   Date Value Ref Range Status   11/22/2019 15.1 13.3 - 17.7 g/dL Final     Hematocrit   Date Value Ref Range Status   11/23/2022 46.5 40.0 - 53.0 % Final   09/29/2020 42.4 40.0 - 53.0 % Final     Cholesterol   Date Value Ref Range Status   11/23/2022 147 <200 mg/dL Final   09/29/2020 141 <200 mg/dL Final     Cholesterol/HDL Ratio   Date Value Ref Range Status   03/17/2015 3.2 0.0 - 5.0 Final     HDL Cholesterol   Date Value Ref Range Status   09/29/2020 54 >39 mg/dL Final     Direct Measure HDL   Date Value Ref Range Status   11/23/2022 55 >=40 mg/dL Final     LDL Cholesterol Calculated   Date Value Ref Range Status   11/23/2022 80 <=100 mg/dL Final   09/29/2020 69 <100 mg/dL Final     Comment:     Desirable:       <100 mg/dl     VLDL-Cholesterol   Date Value Ref Range Status   03/17/2015 23 0 - 30 mg/dL Final     Triglycerides   Date Value Ref Range Status   11/23/2022 59 <150 mg/dL Final   09/29/2020 91 <150 mg/dL Final     Comment:     Non Fasting     Albumin Urine mg/L   Date Value Ref Range Status   11/23/2022 8 mg/L Final   09/29/2020 10 mg/L Final     TSH   Date Value Ref Range Status   11/23/2022 1.56 0.40 - 4.00 mU/L Final   07/26/2017 1.37 0.40 - 4.00 mU/L Final         Last Basic Metabolic Panel:    Sodium   Date Value Ref Range Status   11/23/2022 142 133 - 144 mmol/L Final   09/29/2020 139 133 - 144 mmol/L Final     Potassium   Date Value Ref Range Status   11/23/2022 4.0 3.4 - 5.3 mmol/L Final   09/29/2020 4.2 3.4 - 5.3 mmol/L Final     Chloride   Date Value Ref Range Status   11/23/2022 108 94 - 109 mmol/L Final   09/29/2020 107 94 - 109 mmol/L Final     Calcium   Date Value Ref Range Status   11/23/2022 8.9 8.5 - 10.1 mg/dL Final   09/29/2020 8.4 (L) 8.5 - 10.1 mg/dL Final     Carbon Dioxide   Date Value Ref Range Status   09/29/2020 29 20 - 32 mmol/L Final      Carbon Dioxide (CO2)   Date Value Ref Range Status   11/23/2022 28 20 - 32 mmol/L Final     Urea Nitrogen   Date Value Ref Range Status   11/23/2022 18 7 - 30 mg/dL Final   09/29/2020 21 7 - 30 mg/dL Final     Creatinine   Date Value Ref Range Status   11/23/2022 0.95 0.66 - 1.25 mg/dL Final   09/29/2020 0.81 0.66 - 1.25 mg/dL Final     GFR Estimate   Date Value Ref Range Status   11/23/2022 >90 >60 mL/min/1.73m2 Final     Comment:     Effective December 21, 2021 eGFRcr in adults is calculated using the 2021 CKD-EPI creatinine equation which includes age and gender (Latasha et al., NEJM, DOI: 10.1056/HOYErv2033519)   09/29/2020 >90 >60 mL/min/[1.73_m2] Final     Comment:     Non  GFR Calc  Starting 12/18/2018, serum creatinine based estimated GFR (eGFR) will be   calculated using the Chronic Kidney Disease Epidemiology Collaboration   (CKD-EPI) equation.       Glucose   Date Value Ref Range Status   11/23/2022 127 (H) 70 - 99 mg/dL Final   09/29/2020 226 (H) 70 - 99 mg/dL Final     Comment:     Non Fasting       AST   Date Value Ref Range Status   11/23/2022 17 0 - 45 U/L Final   09/29/2020 18 0 - 45 U/L Final     ALT   Date Value Ref Range Status   11/23/2022 31 0 - 70 U/L Final   09/29/2020 31 0 - 70 U/L Final     Albumin Urine mg/g Cr   Date Value Ref Range Status   11/23/2022 3.96 0.00 - 17.00 mg/g Cr Final   09/29/2020 4.15 0 - 17 mg/g Cr Final                           Again, thank you for allowing me to participate in the care of your patient.        Sincerely,        Rupali Resendiz MD

## 2024-01-17 NOTE — PATIENT INSTRUCTIONS
[facility]-Department of Endocrinology  Diabetes Educators:   Anju Barrios, RN and Aspen Gill RN  Clinic Nurse: JASPER Lawrence  CMA's: Isela Sandhu and Madhu   EMT: Cj  Scheduling/Clinic phone number : [phone]   Clinic Fax: [phone]  [facility] (after hours/weekends): [phone] option 4    Please call the number below to schedule your labs.  [facility] [phone]   [facility] [phone]   [place] [phone]   [facility]  [phone]   [facility] [phone]   [place] [phone]   [facility] [place]) [phone]   [facility]   [place] [phone]   [place] [phone]   [place] [phone]   [place] [phone]     Please reach out to the following centers to schedule your imaging appointment:  Imaging (DEXA, CT, MRI, XRAY)    [facility] ([facility], [facility] [place] [place]) [phone]   [facility] ([place], [place]) [phone]   [facility] ([facility]) [phone]   [facility] ([facility]) [phone]     Appointment Reminders:  * Please bring meter with for staff to download  * If you are due ONLY for an A1C, it is scheduled with the nurse and will be done in clinic. You do not need to schedule a lab appointment. Fasting is not required for an A1C.  * Refill request should be submitted to your pharmacy. They will contact clinic for approval.

## 2024-01-17 NOTE — PROGRESS NOTES
Assessment and plan:    1.  Type 1 diabetes mellitus, well controlled, not known to be complicated by microvascular disease. 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   We discussed about considering an insulin pump in order to decrease the variability of the blood sugar numbers.  The patient appears open to consider Omnipod.  He is concerned about the pod falling off and this can be addressed with extra adhesive patches.  If he decides to transition to an insulin pump, I recommended to schedule an appointment with the diabetes educator.   F/up labs at his next visit.     2. Hypercholesterolemia  Recommended to increase the dose of atorvastatin to 10 mg daily.    Orders Placed This Encounter   Procedures    GLUCOSE MONITOR, 72 HOUR, PHYS INTER    Comprehensive metabolic panel    Lipid panel reflex to direct LDL Fasting    Hematocrit    Albumin Random Urine Quantitative with Creat Ratio    Hemoglobin A1c     =========================================================================================    Tl Sands is a 53 year old man seen in follow-up for type 1 diabetes, diagnosed in 2005.  His last visit in our clinic was in 7/2023.    He has not known diabetes complications and his average hemoglobin A1c over the recent years has been around 7 to 7.5.  Most recent A1c was 6.6%, in July 2023.  It was 6.4% today.    The Dexcom sensor reveals an average glucose of 139, with a coefficient of variation of 40%, corresponding to a GMI of 6.6%.  The time CGM is active is 81%.  On the sensor, 71% of the glucose numbers are within target of , 3% are above 250, 5% are in the mild hypoglycemic range and 2% are below 54.  The sensor reveals a few hypoglycemic episodes occurring weekly, at different times during the day and night, with no identifiable pattern.  Most of the hypoglycemic episodes are postprandial. Alarms for low-dose set up at 65 and for highs at 200.  Not always administering insulin 10 minutes prior  to eating, especially while at work.  He has been exercising regularly: Kickboxing, cardio exercises.  Denies experiencing lows while exercising.    Current insulin regimen:  22 unit(s) semglees in am   1 unit Humalog per 10 g carbohydrates  Correction 1 U per 25 above 120   Average Humalog dose for meals is 5 to 8 units.      Diabetes complications:  Last eye exam 10/2023 -  no  Note reviewed.   No h/o proteinuria.  GFR >90; urine microalbumin negative 11/22  No numbness or tingling sensation   He denies prior episodes of loss of consciousness due to hypoglycemia.  The lowest blood glucose he remembers was 40.  Feels weak and jittery when his BG is low. With severe lows he has sweating.   He does have glucagon at home.  He has only been taking 1/2 tablets of 10 mg atorvastatin daily.   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   Type 1 diabetes   Tinea pedis   Finger fracture   ED  LBP     PSM:  B/L inguinal hernia repair   B/L arthroscopic knee surgery        Current Outpatient Medications:     aspirin (ASA) 81 MG EC tablet, Take 1 tablet (81 mg) by mouth daily, Disp: 90 tablet, Rfl: 3    atorvastatin (LIPITOR) 10 MG tablet, Take 1 tablet (10 mg) by mouth at bedtime, Disp: 90 tablet, Rfl: 0    clobetasol (TEMOVATE) 0.05 % external cream, Apply twice daily up to 1 week for scar, take 1 week off, repeat if needed, Disp: 45 g, Rfl: 0    clotrimazole (LOTRIMIN) 1 % external cream, APPLY  CREAM TOPICALLY TWICE DAILY, Disp: 60 g, Rfl: 0    Continuous Blood Gluc Sensor (DEXCOM G6 SENSOR) MISC, USE AS DIRECTED TO  CHECK  GLUCOSE.  REPLACE  WITH  NEW  SENSOR  EVERY  10  DAYS, Disp: 9 each, Rfl: 3    Continuous Blood Gluc Sensor (DEXCOM G7 SENSOR) MISC, 1 each every 10 days, Disp: 9 each, Rfl: 3    Continuous Blood Gluc Transmit (DEXCOM G6 TRANSMITTER) MISC, USE AS DIRECTED AND  REPLACE  WITH  NEW  TRANSMITTER  EVERY  3  MONTHS, Disp: 1 each,  Rfl: 3    econazole nitrate 1 % external cream, Apply topically 2 times daily, Disp: 60 g, Rfl: 3    fluorouracil (EFUDEX) 5 % external cream, Apply twice daily for 3 weeks to temples, Disp: 40 g, Rfl: 0    Glucagon (BAQSIMI ONE PACK) 3 MG/DOSE POWD, Spray 3 mg in nostril daily as needed (for severe hypoglycemic episodes), Disp: 1 each, Rfl: 3    insulin lispro (HUMALOG KWIKPEN) 100 UNIT/ML (1 unit dial) KWIKPEN, INJECT 40 UNITS SUBCUTANEOUSLY ONCE DAILY FOR CARB COVERAGE, CORRECTION, AND PRIMING AS DIRECTED, Disp: 45 mL, Rfl: 3    insulin pen needle (BD PEN NEEDLE MICRO U/F) 32G X 6 MM miscellaneous, USE 1 EACH SUBCUTANEOUSLY 4 TIMES DAILY AS DIRECTED, Disp: 400 each, Rfl: 3    meloxicam (MOBIC) 15 MG tablet, Take 1 tablet (15 mg) by mouth daily as needed for moderate pain (4-6) Profile Rx, Disp: 90 tablet, Rfl: 0    mometasone (ELOCON) 0.1 % external solution, Apply small amount to rash on scalp up to once daily as needed for up to 1 week, Disp: 60 mL, Rfl: 11    omeprazole (PRILOSEC) 40 MG DR capsule, Take 1 capsule (40 mg) by mouth daily, Disp: 90 capsule, Rfl: 3    SEMGLEE, YFGN, 100 UNIT/ML SOPN, INJECT 22 UNITS SUBCUTANEOUSLY AT BEDTIME **PRIME  WITH  2  UNITS  EACH  TIME.**, Disp: 21 mL, Rfl: 2    tadalafil (CIALIS) 20 MG tablet, Take 1 tablet (20 mg) by mouth daily as needed (ED), Disp: 20 tablet, Rfl: 3  No current facility-administered medications for this visit.    Facility-Administered Medications Ordered in Other Visits:     bupivacaine (MARCAINE) injection 0.5% (PF), 2 mL, INTRAPLEURAL, Once, Thai Mims DO    methylPREDNISolone (DEPO-MEDROL) injection 80 mg, 80 mg, Intramuscular, Once, Thai Mims DO    HABITS:  He does not use tobacco or alcohol.      SOCIAL HISTORY:  He is  and lives with his wife and children in [place]. 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      Family history  Maternal grandfather - type 1 diabetes. Paternal uncle also has type 1 diabetes. No f/h of  thyroid disease. Paternal uncle - colon cancer.    PHYSICAL EXAMINATION:   Wt Readings from Last 10 Encounters:   01/17/24 98.9 kg (218 lb)   07/11/23 99.8 kg (220 lb)   01/10/23 99.8 kg (220 lb)   11/23/22 95.3 kg (210 lb)   11/24/21 97.8 kg (215 lb 11.2 oz)   08/17/21 97.6 kg (215 lb 3.2 oz)   01/06/21 101.3 kg (223 lb 4 oz)   09/15/20 95.3 kg (210 lb)   01/09/20 99.3 kg (219 lb)   12/26/19 99.6 kg (219 lb 9.6 oz)     BP Readings from Last 6 Encounters:   07/11/23 134/84   01/10/23 118/79   11/23/22 126/77   11/24/21 127/82   08/17/21 138/78   01/06/21 (!) 140/90     /83 (BP Location: Left arm, Patient Position: Sitting, Cuff Size: Adult Regular)   Pulse 67   Resp 16   Wt 98.9 kg (218 lb)   SpO2 99%   BMI 31.50 kg/m      General appearance: he is well-developed, well-nourished, and in no distress.  Eyes: conjunctivae and extraocular motions are normal. Pupils are equal, round, and reactive to light. No lid lag, no stare.  HENT: oropharynx clear and moist; neck no JVD, no bruits, no thyromegaly, no palpable nodules  Cardiovascular: regular rhythm, no murmurs, distal pulses palpable, no edema  Respiratory: chest clear, no rales, no rhonchi  Musculoskeletal: normal tone and strength   Neurologic: reflexes normal and symmetric, no resting tremor  Psychiatric: affect and judgment normal   Skin: Mild dystrophy at the site of prior abdominal insulin injections  Feet: sensation intact to monofilament testing    LABORATORY TESTS:   I reviewed prior lab results documented in Epic.   Lab Results   Component Value Date    A1C 6.6 (H) 11/23/2022    A1C 7.8 (H) 02/01/2022    A1C 7.6 (H) 09/10/2021    A1C 6.7 (H) 09/29/2020    A1C 8.0 (A) 08/28/2019    A1C 7.2 08/29/2018    A1C 7.1 (H) 07/26/2017    A1C 7.6 09/13/2016    HEMOGLOBINA1 6.6 (A) 07/11/2023       Hemoglobin   Date Value Ref Range Status   11/22/2019 15.1 13.3 - 17.7 g/dL Final     Hematocrit   Date Value Ref Range Status   11/23/2022 46.5 40.0 - 53.0 %  Final   09/29/2020 42.4 40.0 - 53.0 % Final     Cholesterol   Date Value Ref Range Status   11/23/2022 147 <200 mg/dL Final   09/29/2020 141 <200 mg/dL Final     Cholesterol/HDL Ratio   Date Value Ref Range Status   03/17/2015 3.2 0.0 - 5.0 Final     HDL Cholesterol   Date Value Ref Range Status   09/29/2020 54 >39 mg/dL Final     Direct Measure HDL   Date Value Ref Range Status   11/23/2022 55 >=40 mg/dL Final     LDL Cholesterol Calculated   Date Value Ref Range Status   11/23/2022 80 <=100 mg/dL Final   09/29/2020 69 <100 mg/dL Final     Comment:     Desirable:       <100 mg/dl     VLDL-Cholesterol   Date Value Ref Range Status   03/17/2015 23 0 - 30 mg/dL Final     Triglycerides   Date Value Ref Range Status   11/23/2022 59 <150 mg/dL Final   09/29/2020 91 <150 mg/dL Final     Comment:     Non Fasting     Albumin Urine mg/L   Date Value Ref Range Status   11/23/2022 8 mg/L Final   09/29/2020 10 mg/L Final     TSH   Date Value Ref Range Status   11/23/2022 1.56 0.40 - 4.00 mU/L Final   07/26/2017 1.37 0.40 - 4.00 mU/L Final         Last Basic Metabolic Panel:    Sodium   Date Value Ref Range Status   11/23/2022 142 133 - 144 mmol/L Final   09/29/2020 139 133 - 144 mmol/L Final     Potassium   Date Value Ref Range Status   11/23/2022 4.0 3.4 - 5.3 mmol/L Final   09/29/2020 4.2 3.4 - 5.3 mmol/L Final     Chloride   Date Value Ref Range Status   11/23/2022 108 94 - 109 mmol/L Final   09/29/2020 107 94 - 109 mmol/L Final     Calcium   Date Value Ref Range Status   11/23/2022 8.9 8.5 - 10.1 mg/dL Final   09/29/2020 8.4 (L) 8.5 - 10.1 mg/dL Final     Carbon Dioxide   Date Value Ref Range Status   09/29/2020 29 20 - 32 mmol/L Final     Carbon Dioxide (CO2)   Date Value Ref Range Status   11/23/2022 28 20 - 32 mmol/L Final     Urea Nitrogen   Date Value Ref Range Status   11/23/2022 18 7 - 30 mg/dL Final   09/29/2020 21 7 - 30 mg/dL Final     Creatinine   Date Value Ref Range Status   11/23/2022 0.95 0.66 - 1.25 mg/dL  Final   09/29/2020 0.81 0.66 - 1.25 mg/dL Final     GFR Estimate   Date Value Ref Range Status   11/23/2022 >90 >60 mL/min/1.73m2 Final     Comment:     Effective December 21, 2021 eGFRcr in adults is calculated using the 2021 CKD-EPI creatinine equation which includes age and gender (Latasha christian al., NEJ, DOI: 10.1056/OGEUwk5171862)   09/29/2020 >90 >60 mL/min/[1.73_m2] Final     Comment:     Non  GFR Calc  Starting 12/18/2018, serum creatinine based estimated GFR (eGFR) will be   calculated using the Chronic Kidney Disease Epidemiology Collaboration   (CKD-EPI) equation.       Glucose   Date Value Ref Range Status   11/23/2022 127 (H) 70 - 99 mg/dL Final   09/29/2020 226 (H) 70 - 99 mg/dL Final     Comment:     Non Fasting       AST   Date Value Ref Range Status   11/23/2022 17 0 - 45 U/L Final   09/29/2020 18 0 - 45 U/L Final     ALT   Date Value Ref Range Status   11/23/2022 31 0 - 70 U/L Final   09/29/2020 31 0 - 70 U/L Final     Albumin Urine mg/g Cr   Date Value Ref Range Status   11/23/2022 3.96 0.00 - 17.00 mg/g Cr Final   09/29/2020 4.15 0 - 17 mg/g Cr Final

## 2024-01-17 NOTE — NURSING NOTE
Tl Sands's goals for this visit include:   Chief Complaint   Patient presents with    Follow Up     DMI       He requests these members of his care team be copied on today's visit information: yes    PCP: Jakub Wolff    Referring Provider:  No referring provider defined for this encounter.    /83 (BP Location: Left arm, Patient Position: Sitting, Cuff Size: Adult Regular)   Pulse 67   Resp 16   Wt 98.9 kg (218 lb)   SpO2 99%   BMI 31.50 kg/m      Do you need any medication refills at today's visit? None    Cj Lopez, EMT

## 2024-01-18 LAB
CREAT UR-MCNC: 418 MG/DL
MICROALBUMIN UR-MCNC: 28.7 MG/L
MICROALBUMIN/CREAT UR: 6.87 MG/G CR (ref 0–17)

## 2024-02-10 ENCOUNTER — HEALTH MAINTENANCE LETTER (OUTPATIENT)
Age: 54
End: 2024-02-10

## 2024-04-06 ENCOUNTER — TELEPHONE (OUTPATIENT)
Dept: ENDOCRINOLOGY | Facility: CLINIC | Age: 54
End: 2024-04-06
Payer: COMMERCIAL

## 2024-04-06 DIAGNOSIS — E10.9 TYPE I DIABETES MELLITUS, WELL CONTROLLED (H): ICD-10-CM

## 2024-04-11 RX ORDER — ACYCLOVIR 400 MG/1
TABLET ORAL
Qty: 9 EACH | Refills: 2 | Status: SHIPPED | OUTPATIENT
Start: 2024-04-11

## 2024-04-11 NOTE — TELEPHONE ENCOUNTER
Patient had the pharmacy call in to check status, he would like to pick these up today, please refill ASAP or call with a status please, thanks!     Health system PHARMACY [facility] [address]

## 2024-04-11 NOTE — TELEPHONE ENCOUNTER
Writer reached out to [facility] and confirmed prescription was received.   Pharmacy has already sent a text to patient alerting him that medication is ready for .       Isela Fernandez, LORRI  Adult Endocrinology   [facility]

## 2024-07-08 DIAGNOSIS — E10.9 TYPE I DIABETES MELLITUS, WELL CONTROLLED (H): ICD-10-CM

## 2024-07-08 RX ORDER — INSULIN LISPRO 100 [IU]/ML
INJECTION, SOLUTION INTRAVENOUS; SUBCUTANEOUS
Qty: 45 ML | Refills: 2 | Status: SHIPPED | OUTPATIENT
Start: 2024-07-08 | End: 2024-08-27

## 2024-07-08 NOTE — TELEPHONE ENCOUNTER
Health Call Center    Phone Message    May a detailed message be left on voicemail: yes     Reason for Call: Medication Refill Request    Has the patient contacted the pharmacy for the refill? Yes   Name of medication being requested: insulin lispro (HUMALOG KWIKPEN) 100 UNIT/ML (1 unit dial) KWIKPEN   Provider who prescribed the medication: Astrid  Pharmacy:  [facility] [address] [place] [address] [address] [facility] [address]    Date medication is needed: ASAP, patient will be ou this afternoon.       Action Taken: Message routed to:  [facility] Endo    Travel Screening: Not Applicable     Date of Service:

## 2024-08-27 ENCOUNTER — TELEPHONE (OUTPATIENT)
Dept: ENDOCRINOLOGY | Facility: CLINIC | Age: 54
End: 2024-08-27
Payer: COMMERCIAL

## 2024-08-27 DIAGNOSIS — E10.9 TYPE I DIABETES MELLITUS, WELL CONTROLLED (H): ICD-10-CM

## 2024-08-27 RX ORDER — INSULIN LISPRO 100 [IU]/ML
INJECTION, SOLUTION INTRAVENOUS; SUBCUTANEOUS
Qty: 45 ML | Refills: 2 | Status: SHIPPED | OUTPATIENT
Start: 2024-08-27 | End: 2024-09-03

## 2024-08-27 NOTE — TELEPHONE ENCOUNTER
[facility] Call Center    Phone Message    May a detailed message be left on voicemail: yes     Reason for Call: Medication Question or concern regarding medication   Prescription Clarification  Name of Medication:   insulin lispro (HUMALOG KWIKPEN) 100 UNIT/ML (1 unit dial) KWIKPEN   Prescribing Provider: Dr. Resendiz   Pharmacy:   [facility] [address] [place] [address] [address]      What on the order needs clarification? Pharmacy needing call back, needing to verify max daily supply of this medication

## 2024-08-27 NOTE — TELEPHONE ENCOUNTER
Prescription updated to Katja.    Anju Barrios RN, [facility]  Diabetes Education Department  [facility], [place]  Phone: [phone]  [phone]

## 2024-08-31 ENCOUNTER — MYC MEDICAL ADVICE (OUTPATIENT)
Dept: ENDOCRINOLOGY | Facility: CLINIC | Age: 54
End: 2024-08-31
Payer: COMMERCIAL

## 2024-08-31 DIAGNOSIS — E10.9 TYPE I DIABETES MELLITUS, WELL CONTROLLED (H): ICD-10-CM

## 2024-08-31 DIAGNOSIS — E10.9 TYPE I DIABETES MELLITUS, WELL CONTROLLED (H): Primary | ICD-10-CM

## 2024-08-31 RX ORDER — INSULIN LISPRO 100 [IU]/ML
INJECTION, SOLUTION INTRAVENOUS; SUBCUTANEOUS
Qty: 30 ML | Refills: 0 | Status: SHIPPED | OUTPATIENT
Start: 2024-08-31 | End: 2024-09-03

## 2024-08-31 RX ORDER — INSULIN LISPRO 100 [IU]/ML
INJECTION, SOLUTION INTRAVENOUS; SUBCUTANEOUS
Qty: 15 ML | Refills: 0 | Status: SHIPPED | OUTPATIENT
Start: 2024-08-31 | End: 2024-09-03

## 2024-08-31 NOTE — TELEPHONE ENCOUNTER
Patient paged requesting prescription refill for his Humalog since he will be out of it by the end of the day. The patient has apparently been using more than 40 units Humalog per day. Sent refill 30 mL Kwikpen to patient's pharmacy and will notify his provider, Dr. Resendiz.

## 2024-08-31 NOTE — TELEPHONE ENCOUNTER
Patient paged requesting prescription refill for his Humalog since he will be out of it by the end of the day. The patient has apparently been using more than 40 units Humalog per day. Sent refill 15 mL Kwikpen to patient's pharmacy and will notify his provider, Dr. Resendiz.   Reza Del Angel

## 2024-09-03 RX ORDER — INSULIN LISPRO 100 [IU]/ML
INJECTION, SOLUTION INTRAVENOUS; SUBCUTANEOUS
Qty: 60 ML | Refills: 1 | Status: SHIPPED | OUTPATIENT
Start: 2024-09-03

## 2024-09-07 ENCOUNTER — HEALTH MAINTENANCE LETTER (OUTPATIENT)
Age: 54
End: 2024-09-07

## 2024-10-17 ENCOUNTER — TRANSFERRED RECORDS (OUTPATIENT)
Dept: HEALTH INFORMATION MANAGEMENT | Facility: CLINIC | Age: 54
End: 2024-10-17
Payer: COMMERCIAL

## 2025-01-11 ENCOUNTER — NURSE TRIAGE (OUTPATIENT)
Dept: NURSING | Facility: CLINIC | Age: 55
End: 2025-01-11
Payer: COMMERCIAL

## 2025-01-11 ENCOUNTER — TELEPHONE (OUTPATIENT)
Dept: ENDOCRINOLOGY | Facility: CLINIC | Age: 55
End: 2025-01-11
Payer: COMMERCIAL

## 2025-01-11 DIAGNOSIS — E10.9 TYPE I DIABETES MELLITUS, WELL CONTROLLED (H): ICD-10-CM

## 2025-01-11 RX ORDER — INSULIN LISPRO 100 [IU]/ML
INJECTION, SOLUTION INTRAVENOUS; SUBCUTANEOUS
Qty: 60 ML | Refills: 2 | Status: SHIPPED | OUTPATIENT
Start: 2025-01-11

## 2025-01-11 NOTE — TELEPHONE ENCOUNTER
"Endocrinology:[facility]: Dr URBAN Resendiz. Patient is currently at  [facility] pharmacy: [place]  to  Humalog RX: insurance says it is \"too soon\". He states he used more Humalog this month after being sick and was prescribed prednisone. He usually takes with meals: sliding scale depends on what he eats, up to 50 units per day. His blood sugar went on while on prednisone.  BS now: \"fine\". Has enough to get through the day.   He has a refill available, but needs Dr authorization to refill \"too soon\". He has a G7 monitor, has been off prednisone x 1 week.  Type 1 diabetes--last seen by Dr 1/17/2024.       Provider consult indicated.     Reason for page: out of Humalog insulin--needs authorization for refill     Page sent to Dr. Jordi López by Answering Service at 11:30 am .   11:58 am Dr López returned call. He will contact pharmacist for more information and also to order medication.  12:00pm Patient notified. He intends to  RX when it is ready later today.     Cristal Bauman RN Triage Nurse Advisor 12:00 PM 1/11/2025  Reason for Disposition   [1] Prescription refill request for ESSENTIAL medicine (i.e., likelihood of harm to patient if not taken) AND [2] triager unable to refill per department policy    Additional Information   Negative: New-onset or worsening symptoms, see that guideline (e.g., diarrhea, runny nose, sore throat)   Negative: Medicine question not related to refill or renewal   Negative: Caller (e.g., patient or pharmacist) requesting information about a new medicine   Negative: Caller requesting information unrelated to medicine    Protocols used: Medication Refill and Renewal Call-A-AH    "

## 2025-01-11 NOTE — TELEPHONE ENCOUNTER
Receieved a call for patient Humalog dose. Since patient was on steroids, he was requiring more Humalog and running out of his prescription. Able to reimburse it earlier since max daily dose is mentioned as 50 units daily. Increased the max insulin limit to 100 units

## 2025-01-22 ENCOUNTER — LAB (OUTPATIENT)
Dept: LAB | Facility: CLINIC | Age: 55
End: 2025-01-22
Payer: COMMERCIAL

## 2025-01-22 ENCOUNTER — MYC MEDICAL ADVICE (OUTPATIENT)
Dept: ENDOCRINOLOGY | Facility: CLINIC | Age: 55
End: 2025-01-22

## 2025-01-22 DIAGNOSIS — E10.9 TYPE 1 DIABETES MELLITUS WITHOUT COMPLICATION (H): Primary | ICD-10-CM

## 2025-01-22 DIAGNOSIS — E78.00 HYPERCHOLESTEREMIA: ICD-10-CM

## 2025-01-22 DIAGNOSIS — E10.9 TYPE 1 DIABETES MELLITUS WITHOUT COMPLICATION (H): ICD-10-CM

## 2025-01-22 LAB
ALBUMIN SERPL BCG-MCNC: 4.2 G/DL (ref 3.5–5.2)
ALP SERPL-CCNC: 74 U/L (ref 40–150)
ALT SERPL W P-5'-P-CCNC: 25 U/L (ref 0–70)
ANION GAP SERPL CALCULATED.3IONS-SCNC: 11 MMOL/L (ref 7–15)
AST SERPL W P-5'-P-CCNC: 23 U/L (ref 0–45)
BILIRUB SERPL-MCNC: 0.8 MG/DL
BUN SERPL-MCNC: 14.7 MG/DL (ref 6–20)
CALCIUM SERPL-MCNC: 9.6 MG/DL (ref 8.8–10.4)
CHLORIDE SERPL-SCNC: 105 MMOL/L (ref 98–107)
CHOLEST SERPL-MCNC: 279 MG/DL
CREAT SERPL-MCNC: 0.98 MG/DL (ref 0.67–1.17)
EGFRCR SERPLBLD CKD-EPI 2021: >90 ML/MIN/1.73M2
EST. AVERAGE GLUCOSE BLD GHB EST-MCNC: 143 MG/DL
FASTING STATUS PATIENT QL REPORTED: YES
FASTING STATUS PATIENT QL REPORTED: YES
GLUCOSE SERPL-MCNC: 164 MG/DL (ref 70–99)
HBA1C MFR BLD: 6.6 % (ref 0–5.6)
HCO3 SERPL-SCNC: 26 MMOL/L (ref 22–29)
HCT VFR BLD AUTO: 44.4 % (ref 40–53)
HDLC SERPL-MCNC: 54 MG/DL
LDLC SERPL CALC-MCNC: 211 MG/DL
NONHDLC SERPL-MCNC: 225 MG/DL
POTASSIUM SERPL-SCNC: 4.2 MMOL/L (ref 3.4–5.3)
PROT SERPL-MCNC: 8.4 G/DL (ref 6.4–8.3)
SODIUM SERPL-SCNC: 142 MMOL/L (ref 135–145)
TRIGL SERPL-MCNC: 72 MG/DL

## 2025-01-22 PROCEDURE — 80053 COMPREHEN METABOLIC PANEL: CPT

## 2025-01-22 PROCEDURE — 83036 HEMOGLOBIN GLYCOSYLATED A1C: CPT

## 2025-01-22 PROCEDURE — 36415 COLL VENOUS BLD VENIPUNCTURE: CPT

## 2025-01-22 PROCEDURE — 85014 HEMATOCRIT: CPT

## 2025-01-22 PROCEDURE — 80061 LIPID PANEL: CPT

## 2025-01-22 NOTE — TELEPHONE ENCOUNTER
1/22 Called and spoke to patient, appointment is currently scheduled.     [facility]   Orthopedics, Podiatry, Sports Medicine, Ent ,Eye , Audiology, Adult Endocrine & Diabetes, Nutrition & Medication Therapy Management Specialties   [facility]

## 2025-01-23 ENCOUNTER — LAB (OUTPATIENT)
Dept: LAB | Facility: CLINIC | Age: 55
End: 2025-01-23
Payer: COMMERCIAL

## 2025-01-23 DIAGNOSIS — E78.00 HYPERCHOLESTEREMIA: ICD-10-CM

## 2025-01-23 LAB
CHOLEST SERPL-MCNC: 144 MG/DL
CREAT UR-MCNC: 258 MG/DL
FASTING STATUS PATIENT QL REPORTED: YES
HDLC SERPL-MCNC: 53 MG/DL
LDLC SERPL CALC-MCNC: 78 MG/DL
MICROALBUMIN UR-MCNC: <12 MG/L
MICROALBUMIN/CREAT UR: NORMAL MG/G{CREAT}
NONHDLC SERPL-MCNC: 91 MG/DL
TRIGL SERPL-MCNC: 63 MG/DL

## 2025-01-29 LAB
CHOLEST SERPL-MCNC: NORMAL MG/DL
FASTING STATUS PATIENT QL REPORTED: YES
HDLC SERPL-MCNC: NORMAL MG/DL
LDLC SERPL CALC-MCNC: NORMAL MG/DL
NONHDLC SERPL-MCNC: NORMAL MG/DL
TRIGL SERPL-MCNC: NORMAL MG/DL

## 2025-01-31 DIAGNOSIS — E10.9 TYPE I DIABETES MELLITUS, WELL CONTROLLED (H): ICD-10-CM

## 2025-01-31 DIAGNOSIS — E10.9 TYPE 1 DIABETES MELLITUS WITHOUT COMPLICATION (H): ICD-10-CM

## 2025-02-05 RX ORDER — ACYCLOVIR 400 MG/1
TABLET ORAL
Qty: 9 EACH | Refills: 0 | Status: SHIPPED | OUTPATIENT
Start: 2025-02-05

## 2025-02-05 RX ORDER — ATORVASTATIN CALCIUM 10 MG/1
10 TABLET, FILM COATED ORAL AT BEDTIME
Qty: 90 TABLET | Refills: 0 | Status: SHIPPED | OUTPATIENT
Start: 2025-02-05

## 2025-02-05 NOTE — TELEPHONE ENCOUNTER
LVD:  1/17/2024  [facility]  NVD 3/7/25- refill provided  LDL Cholesterol Calculated   Date Value Ref Range Status   01/23/2025 78 <100 mg/dL Final   09/29/2020 69 <100 mg/dL Final     Comment:     Desirable:       <100 mg/dl         Medication: atorvastatin  Refill decision: Medication refilled per  Medication Refill in Ambulatory Care  policy.

## 2025-02-06 DIAGNOSIS — N52.9 ERECTILE DYSFUNCTION, UNSPECIFIED ERECTILE DYSFUNCTION TYPE: ICD-10-CM

## 2025-02-11 RX ORDER — TADALAFIL 20 MG/1
20 TABLET ORAL DAILY PRN
Qty: 20 TABLET | Refills: 0 | OUTPATIENT
Start: 2025-02-11

## 2025-02-11 NOTE — TELEPHONE ENCOUNTER
tadalafil (CIALIS) 20 MG tablet     The original prescription was reordered on 2/7/2025 by Rupali Resendiz MD.   Addressed in a different encounter.

## 2025-02-15 DIAGNOSIS — E10.9 TYPE I DIABETES MELLITUS, WELL CONTROLLED (H): Primary | ICD-10-CM

## 2025-02-16 NOTE — TELEPHONE ENCOUNTER
Disp Refills Start End [facility]   Insulin Glargine-yfgn (SEMGLEE, YFGN,) 100 UNIT/ML SOLN 30 mL 3 1/17/2024 -- No   Sig - Route: Inject 22 Units Subcutaneous daily - Subcutaneous       Rupali Resendiz MD  Endocrinology, Diabetes, and Metabolism  Lv 1/17/24 MG  Nv  3/11/25 MG    Routed because: endo triage to fill

## 2025-02-17 RX ORDER — INSULIN GLARGINE-YFGN 100 [IU]/ML
INJECTION, SOLUTION SUBCUTANEOUS
Qty: 15 ML | Refills: 0 | Status: SHIPPED | OUTPATIENT
Start: 2025-02-17

## 2025-03-07 ENCOUNTER — TRANSFERRED RECORDS (OUTPATIENT)
Dept: HEALTH INFORMATION MANAGEMENT | Facility: CLINIC | Age: 55
End: 2025-03-07
Payer: COMMERCIAL

## 2025-03-08 ENCOUNTER — HEALTH MAINTENANCE LETTER (OUTPATIENT)
Age: 55
End: 2025-03-08

## 2025-03-10 ENCOUNTER — MYC MEDICAL ADVICE (OUTPATIENT)
Dept: ENDOCRINOLOGY | Facility: CLINIC | Age: 55
End: 2025-03-10
Payer: COMMERCIAL

## 2025-03-10 ENCOUNTER — MYC MEDICAL ADVICE (OUTPATIENT)
Dept: FAMILY MEDICINE | Facility: CLINIC | Age: 55
End: 2025-03-10
Payer: COMMERCIAL

## 2025-03-11 ENCOUNTER — TELEPHONE (OUTPATIENT)
Dept: ENDOCRINOLOGY | Facility: CLINIC | Age: 55
End: 2025-03-11

## 2025-03-11 ENCOUNTER — OFFICE VISIT (OUTPATIENT)
Dept: ENDOCRINOLOGY | Facility: CLINIC | Age: 55
End: 2025-03-11
Payer: COMMERCIAL

## 2025-03-11 VITALS
WEIGHT: 223 LBS | BODY MASS INDEX: 32.23 KG/M2 | RESPIRATION RATE: 16 BRPM | DIASTOLIC BLOOD PRESSURE: 84 MMHG | HEART RATE: 63 BPM | OXYGEN SATURATION: 97 % | SYSTOLIC BLOOD PRESSURE: 129 MMHG

## 2025-03-11 DIAGNOSIS — B35.3 TINEA PEDIS OF BOTH FEET: ICD-10-CM

## 2025-03-11 DIAGNOSIS — E10.9 TYPE I DIABETES MELLITUS, WELL CONTROLLED (H): Primary | ICD-10-CM

## 2025-03-11 DIAGNOSIS — B35.3 TINEA PEDIS OF BOTH FEET: Primary | ICD-10-CM

## 2025-03-11 DIAGNOSIS — E78.00 HYPERCHOLESTEREMIA: ICD-10-CM

## 2025-03-11 DIAGNOSIS — N52.9 ERECTILE DYSFUNCTION, UNSPECIFIED ERECTILE DYSFUNCTION TYPE: ICD-10-CM

## 2025-03-11 PROCEDURE — 3079F DIAST BP 80-89 MM HG: CPT | Performed by: INTERNAL MEDICINE

## 2025-03-11 PROCEDURE — 3074F SYST BP LT 130 MM HG: CPT | Performed by: INTERNAL MEDICINE

## 2025-03-11 PROCEDURE — 99215 OFFICE O/P EST HI 40 MIN: CPT | Performed by: INTERNAL MEDICINE

## 2025-03-11 RX ORDER — UREA/ALLANTOIN/VIT E ACETATE 25 %
CREAM (GRAM) TOPICAL 2 TIMES DAILY
Qty: 30 G | Refills: 1 | Status: SHIPPED | OUTPATIENT
Start: 2025-03-11

## 2025-03-11 RX ORDER — CICLOPIROX 80 MG/ML
SOLUTION TOPICAL
Qty: 6.6 ML | Refills: 11 | Status: SHIPPED | OUTPATIENT
Start: 2025-03-11

## 2025-03-11 NOTE — PATIENT INSTRUCTIONS
Welcome to the [facility] Endocrinology and Diabetes Clinics     Our Endocrinology Clinics are here to provide you with a team-based, collaborative approach in the diagnosis and treatment of patients with diabetes and endocrine disorders. The team is made up of Physicians, Physician Assistants, Certified Diabetes Educators, Registered Nurses, Medical Assistants, Emergency Medical Technicians, and many others, all of whom have the unified goal of providing our patients with high quality care.     Please see below for some helpful tips to best navigate and use the [facility] Endocrinology clinic:     [place] Respect: At [facility], we are committed to a respectful and safe space for all patients, visitors, and staff.  We believe that mutual respect between patients and their care team is the foundation of quality care.  It is our expectation that you will be treated with respect by your care team.  In turn, we ask that all communication with the care team (written and verbal) be respectful and free from profanity, threatening, or abusive language.  Disrespectful communication undermines our therapeutic relationship with you and may result in us being unable to continue to provide your care.    Refills: A provider must see you at least annually to prescribe and refill medications. This is to ensure your safety as well as meet insurance and compliance regulations.    Scheduling: Many of our Providers offer both in-person or video visits. Please call to schedule any needed follow ups as soon as possible because our provider schedules fill up very quickly. Our care team has the right to require an in-person visit when they believe that it is medically necessary. Please remember that for any virtual visits, you must be in the [facility] at the time of the visit, otherwise we are unable to see you and you will need to be rescheduled.    Missed Appointments: If you need to cancel or miss your  scheduled appointment, please call the clinic at [phone] to reschedule.  Please note if you repeatedly miss appointments or repeatedly miss appointments without calling to inform us ahead of time (no-show), the clinic may elect to not allow you to reschedule without speaking to a manager, may require a Partnership In Care Agreement prior to rescheduling, or could result in you no longer being able to receive care from the clinic. Providing the clinic with timely notification if you have to miss an appointment, allows us to better serve the needs of all of our patients.    Primary Care Provider: Our Endocrinologists are Specialists in their field. We expect you to have a Primary Care Provider established to handle any needs outside of your diabetes and endocrine care.  We would be happy to assist you find a Primary Care Provider, if you do not have one.    bodaplanes: bodaplanes is a wonderful resource that allows you access to your Care Team via online or the esther. Please ask a member of the team if you would like help creating an account. Please note that it may take up to 2 business days for a response. bodaplanes messages are not reviewed on weekends or after business hours.  Emergent or urgent care needs should never be communicated via bodaplanes.  If you experience a medical emergency call 911 or go to the nearest emergency room.    Labs: It is recommended that you stay within the [facility] for labs but you are welcome to obtain ordered labs (with some exceptions) from any location of your choice as long as they are able to complete and process the needed labs. If you need us to fax orders to your preferred lab, please provide us the name and fax number of the lab you would like to go to so we can fax the orders. If your labs are drawn outside of the [facility], please have them fax the results to [phone] ([place]) or [phone] ([place]) or via [facility]. It is your  responsibility to ensure that outside lab results are sent to us.    We look forward to working with you. Please do not hesitate to reach out with any questions.    Thank you,    The Endocrine Team    [facility] Address:   [place] Address:     [place] [address]    Phone: [phone]  Fax: [phone]   [address] [place] [address]    Phone: [phone]  Fax: [phone]     [facility] Cost Estimate Phone Number: [phone]    General Lab and Imaging Scheduling Phone Number: [phone]      If you are interested in exploring research opportunities in the Division of Diabetes, Endocrinology and Metabolism and within the [facility] you are welcome to view the following QR codes by scanning them using your phone's camera to access a link to more information.  Participating in a research study is voluntary. Your decision whether or not to participate will not affect your current or future relations with the [facility] or [facility]. Current available studies are:     Type 1 Diabetes  Adults     Pediatrics     Type 2 Diabetes  Weight Loss - People Treated with Diet or Metformin Only     Pediatrics and Young Adults

## 2025-03-11 NOTE — TELEPHONE ENCOUNTER
Retail Pharmacy Prior Authorization Team   Phone: [phone]    PRIOR AUTHORIZATION DENIED    Medication: EFINACONAZOLE 10 % EX SOLN  Insurance Company: [employer] - Phone [phone] Fax [phone]  Denial Date: 3/11/2025  Denial Reason(s): MUST TRY/FAIL CICLODAN 8% SOLN, CICLOPIROX 8% SOLN, OR CICLOPIROX 8% KIT      Appeal Information: IF THE PROVIDER WOULD LIKE TO APPEAL THIS DECISION PLEASE PROVIDE THE PA TEAM WITH A LETTER OF MEDICAL NECESSITY      Patient Notified: NO

## 2025-03-11 NOTE — LETTER
3/11/2025      Tl Sands  [address]  [facility] [id number]      Dear Colleague,    Thank you for referring your patient, Tl Sands, to the [facility]. Please see a copy of my visit note below.      Assessment and plan:    1.  Type 1 diabetes mellitus, well controlled, not known to be complicated by microvascular disease. 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  Counseled on the potential long-term cognitive and cardiac implications.   We re discussed about considering an insulin pump in order to decrease the variability of the blood sugar numbers, the importance of taking insulin 10-15 minutes prior to eating, correction of hypoglycemia.   Plan  - Schedule an appointment with the diabetes educator, Anju, to discuss the potential benefits of using an insulin pump.  - Consider trying an insulin pump to stabilize blood sugar levels and reduce the frequency of hypoglycemic episodes.  - Use Flexitol for dry skin on the feet, available over the counter.  - f/up labs     2. Tines pedis   Prescription for eficonazole entered.     3. Hypercholesterolemia  F/up lipid panel     Orders Placed This Encounter   Procedures     Comprehensive metabolic panel     Lipid panel reflex to direct LDL Fasting     Hematocrit     Albumin Random Urine Quantitative with Creat Ratio     Hemoglobin A1c     =========================================================================================    Tl Sands is a 54 year old man seen in follow-up for type 1 diabetes, diagnosed in 2005.  His last visit in our clinic was in 1/2024.  Kaz Sands, a 54-year-old male, reports an incident that occurred yesterday while walking in his office building in [place]. He slipped on a grassy hill covered with mud and ice, resulting in a fall that injured his left knee. He describes hearing and feeling an internal tear in the knee. An  MRI confirmed a tear in the rectus femoris muscle, with the muscle now displaced. He is preparing for surgery and has completed a pre-op physical.    Kaz has a history of diabetes and experiences fluctuations in blood sugar levels, feeling weak and jittery when levels are low. He reports low blood sugar episodes occurring randomly, sometimes once or twice a week, often before lunch or in the middle of the night. He manages his diabetes with insulin, noting recent changes in his regimen, including using more insulin than before. He typically administers Humalog 10 to 15 minutes before meals but sometimes after, depending on the situation.    He mentions minor eye changes related to diabetes noted in an eye exam last October, which do not affect his vision. He underwent refractive lens replacement a month ago and has since had follow-up appointments confirming resolution of previous issues. He no longer requires glasses.    Kaz also discusses a long-standing issue with dry skin on his left foot, which has been present since he was 15. The dryness sometimes leads to cracking and bleeding, and he suspects a possible fungal infection. He has used various treatments with varying success.    He has considered using an insulin pump but is currently not interested, citing satisfaction with his current routine and concerns about the bulkiness of the device. He mentions family members who have had issues with insulin pumps, leading to hospital visits.     He has not known diabetes complications and his average hemoglobin A1c over the recent years has been around 7%.   Most recent A1c was 6.6%, in January 2025.    Current insulin regimen:  22 unit(s) semglee in am   1 unit Humalog per 8-9 g carbohydrates. On average, he takes 5-8 U per meal and he has 3 meals a day. Takes Humalog before eating ~ 50% of meals.   Correction 1 U per 25 above 120      Diabetes complications:  Last eye exam 10/2024 - nonproliferative DR. Note  reviewed.   No h/o proteinuria.  GFR >90; urine microalbumin negative 1/25  No numbness or tingling sensation   He denies prior episodes of loss of consciousness due to hypoglycemia.  The lowest blood glucose he remembers was 40.  Feels weak and jittery when his BG is low. With severe lows he has sweating.   He does have glucagon at home.  1/23/2025 LDL cholesterol 78, HDL cholesterol 53, triglycerides 63.  On 10 mg atorvastatin daily.  He has been exercising regularly: Kickboxing, cardio exercises.  Denies experiencing lows while exercising.    On the Dexcom sensor, average glucose is 139 with a standard deviation of 56, and a coefficient of variation of 40%, corresponding to an estimated GMI of 6.6%.  68% of the glucose numbers are within target range, 4% are above 250, 9% are in the hypoglycemic range, with 3% below 54.  The patient reports experiencing hypoglycemic symptoms once or twice a week.  The hypoglycemic episodes are unpredictable, symptoms occurring in the middle of the night, sometimes before lunch.  On the sensor, the hypoglycemic episodes occur almost daily but they are mild, mostly in the 60s.  At times, they are preceded by a glucose spike.    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   Type 1 diabetes   Tinea pedis   Finger fracture   ED  LBP     PSM:  B/L inguinal hernia repair   B/L arthroscopic knee surgery        Current Outpatient Medications:      aspirin (ASA) 81 MG EC tablet, Take 1 tablet (81 mg) by mouth daily (Patient not taking: Reported on 1/17/2024), Disp: 90 tablet, Rfl: 3     atorvastatin (LIPITOR) 10 MG tablet, TAKE 1 TABLET BY MOUTH AT BEDTIME, Disp: 90 tablet, Rfl: 0     clobetasol (TEMOVATE) 0.05 % external cream, Apply twice daily up to 1 week for scar, take 1 week off, repeat if needed, Disp: 45 g, Rfl: 0     clotrimazole (LOTRIMIN) 1 % external cream, APPLY  CREAM TOPICALLY TWICE DAILY, Disp: 60 g, Rfl: 0     Continuous Glucose Sensor (DEXCOM G7 SENSOR) MISC,  Change every 10 days., Disp: 9 each, Rfl: 3     econazole nitrate 1 % external cream, Apply topically 2 times daily, Disp: 60 g, Rfl: 3     fluorouracil (EFUDEX) 5 % external cream, Apply twice daily for 3 weeks to temples, Disp: 40 g, Rfl: 0     Glucagon (BAQSIMI ONE PACK) 3 MG/DOSE nasal powder, Spray 1 spray (3 mg) in nostril daily as needed (for severe hypoglycemic episodes), Disp: 1 each, Rfl: 3     Insulin Glargine-yfgn (SEMGLEE, YFGN,) 100 UNIT/ML SOLN, Inject 22 Units Subcutaneous daily, Disp: 30 mL, Rfl: 3     insulin lispro (HUMALOG KWIKPEN) 100 UNIT/ML (1 unit dial) KWIKPEN, INJECT 3 TIMES DAILY SUBCUTANEOUSLY FOR CARB COVERAGE, CORRECTION, AND PRIMING AS DIRECTED. MAX DAILY DOSE 100 UNITS., Disp: 60 mL, Rfl: 2     insulin pen needle (BD PEN NEEDLE MICRO U/F) 32G X 6 MM miscellaneous, USE 1 EACH SUBCUTANEOUSLY 4 TIMES DAILY AS DIRECTED, Disp: 400 each, Rfl: 3     meloxicam (MOBIC) 15 MG tablet, Take 1 tablet (15 mg) by mouth daily as needed for moderate pain (4-6) Profile Rx, Disp: 90 tablet, Rfl: 0     mometasone (ELOCON) 0.1 % external solution, Apply small amount to rash on scalp up to once daily as needed for up to 1 week, Disp: 60 mL, Rfl: 11     omeprazole (PRILOSEC) 40 MG DR capsule, Take 1 capsule (40 mg) by mouth daily, Disp: 90 capsule, Rfl: 3     SEMGLEE, YFGN, 100 UNIT/ML SOPN, INJECT 22  ONCE DAILY, Disp: 15 mL, Rfl: 0     tadalafil (CIALIS) 20 MG tablet, Take 1 tablet (20 mg) by mouth daily as needed (ED)., Disp: 20 tablet, Rfl: 3  No current facility-administered medications for this visit.    Facility-Administered Medications Ordered in Other Visits:      bupivacaine (MARCAINE) injection 0.5% (PF), 2 mL, INTRAPLEURAL, Once, Thai Mims DO     methylPREDNISolone (DEPO-MEDROL) injection 80 mg, 80 mg, Intramuscular, Once, Thai Mims,     HABITS:  He does not use tobacco or alcohol.      SOCIAL HISTORY:  He is  and lives with his wife and children in [place]. 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      Family history  Maternal grandfather - type 1 diabetes. Paternal uncle also has type 1 diabetes. No f/h of thyroid disease. Paternal uncle - colon cancer.    PHYSICAL EXAMINATION:   Wt Readings from Last 10 Encounters:   03/11/25 101.2 kg (223 lb)   01/17/24 98.9 kg (218 lb)   07/11/23 99.8 kg (220 lb)   01/10/23 99.8 kg (220 lb)   11/23/22 95.3 kg (210 lb)   11/24/21 97.8 kg (215 lb 11.2 oz)   08/17/21 97.6 kg (215 lb 3.2 oz)   01/06/21 101.3 kg (223 lb 4 oz)   09/15/20 95.3 kg (210 lb)   01/09/20 99.3 kg (219 lb)     BP Readings from Last 6 Encounters:   03/11/25 129/84   01/17/24 131/83   07/11/23 134/84   01/10/23 118/79   11/23/22 126/77   11/24/21 127/82     /84   Pulse 63   Resp 16   Wt 101.2 kg (223 lb)   SpO2 97%   BMI 32.23 kg/m      Physical exam:  General appearance: he is well-developed, well-nourished, and in no distress.  Eyes: conjunctivae and extraocular motions are normal. Pupils are equal, round, and reactive to light. No lid lag, no stare.  HENT: oropharynx clear and moist; neck no JVD, no bruits, no thyromegaly, no palpable nodules  Cardiovascular: regular rhythm, no murmurs, distal pulses palpable, no edema  Respiratory: chest clear, no rales, no rhonchi  Musculoskeletal: normal tone and strength   Neurologic: reflexes normal and symmetric, no resting tremor  Psychiatric: affect and judgment normal   Skin: Mild dystrophy at the site of prior abdominal insulin injections - mainly right lower quadrant   Feet: sensation intact to monofilament testing; onychomycosis and dry skin, tines pedis     LABORATORY TESTS:   I reviewed prior lab results documented in Epic.   Lab Results   Component Value Date    A1C 6.6 (H) 01/22/2025    A1C 6.4 (H) 01/17/2024    A1C 6.6 (H) 11/23/2022    A1C 7.8 (H) 02/01/2022    A1C 7.6 (H) 09/10/2021    A1C 6.7 (H) 09/29/2020    A1C 8.0 (A) 08/28/2019    A1C 7.2 08/29/2018    A1C 7.1 (H) 07/26/2017    A1C 7.6 09/13/2016     HEMOGLOBINA1 6.6 (A) 07/11/2023       Hemoglobin   Date Value Ref Range Status   11/22/2019 15.1 13.3 - 17.7 g/dL Final     Hematocrit   Date Value Ref Range Status   01/22/2025 44.4 40.0 - 53.0 % Final   09/29/2020 42.4 40.0 - 53.0 % Final     Cholesterol   Date Value Ref Range Status   01/23/2025 144 <200 mg/dL Final   09/29/2020 141 <200 mg/dL Final     Cholesterol/HDL Ratio   Date Value Ref Range Status   03/17/2015 3.2 0.0 - 5.0 Final     HDL Cholesterol   Date Value Ref Range Status   09/29/2020 54 >39 mg/dL Final     Direct Measure HDL   Date Value Ref Range Status   01/23/2025 53 >=40 mg/dL Final     LDL Cholesterol Calculated   Date Value Ref Range Status   01/23/2025 78 <100 mg/dL Final   09/29/2020 69 <100 mg/dL Final     Comment:     Desirable:       <100 mg/dl     VLDL-Cholesterol   Date Value Ref Range Status   03/17/2015 23 0 - 30 mg/dL Final     Triglycerides   Date Value Ref Range Status   01/23/2025 63 <150 mg/dL Final   09/29/2020 91 <150 mg/dL Final     Comment:     Non Fasting     Albumin Urine mg/L   Date Value Ref Range Status   01/22/2025 <12.0 mg/L Final     Comment:     The reference ranges have not been established in urine albumin. The results should be integrated into the clinical context for interpretation.   11/23/2022 8 mg/L Final   09/29/2020 10 mg/L Final     TSH   Date Value Ref Range Status   11/23/2022 1.56 0.40 - 4.00 mU/L Final   07/26/2017 1.37 0.40 - 4.00 mU/L Final         Last Basic Metabolic Panel:    Sodium   Date Value Ref Range Status   01/22/2025 142 135 - 145 mmol/L Final   09/29/2020 139 133 - 144 mmol/L Final     Potassium   Date Value Ref Range Status   01/22/2025 4.2 3.4 - 5.3 mmol/L Final   11/23/2022 4.0 3.4 - 5.3 mmol/L Final   09/29/2020 4.2 3.4 - 5.3 mmol/L Final     Chloride   Date Value Ref Range Status   01/22/2025 105 98 - 107 mmol/L Final   11/23/2022 108 94 - 109 mmol/L Final   09/29/2020 107 94 - 109 mmol/L Final     Calcium   Date Value Ref Range  Status   01/22/2025 9.6 8.8 - 10.4 mg/dL Final     Comment:     Reference intervals for this test were updated on 7/16/2024 to reflect our healthy population more accurately. There may be differences in the flagging of prior results with similar values performed with this method. Those prior results can be interpreted in the context of the updated reference intervals.   09/29/2020 8.4 (L) 8.5 - 10.1 mg/dL Final     Carbon Dioxide   Date Value Ref Range Status   09/29/2020 29 20 - 32 mmol/L Final     Carbon Dioxide (CO2)   Date Value Ref Range Status   01/22/2025 26 22 - 29 mmol/L Final   11/23/2022 28 20 - 32 mmol/L Final     Urea Nitrogen   Date Value Ref Range Status   01/22/2025 14.7 6.0 - 20.0 mg/dL Final   11/23/2022 18 7 - 30 mg/dL Final   09/29/2020 21 7 - 30 mg/dL Final     Creatinine   Date Value Ref Range Status   01/22/2025 0.98 0.67 - 1.17 mg/dL Final   09/29/2020 0.81 0.66 - 1.25 mg/dL Final     GFR Estimate   Date Value Ref Range Status   01/22/2025 >90 >60 mL/min/1.73m2 Final     Comment:     eGFR calculated using 2021 CKD-EPI equation.   09/29/2020 >90 >60 mL/min/[1.73_m2] Final     Comment:     Non  GFR Calc  Starting 12/18/2018, serum creatinine based estimated GFR (eGFR) will be   calculated using the Chronic Kidney Disease Epidemiology Collaboration   (CKD-EPI) equation.       Glucose   Date Value Ref Range Status   01/22/2025 164 (H) 70 - 99 mg/dL Final   11/23/2022 127 (H) 70 - 99 mg/dL Final   09/29/2020 226 (H) 70 - 99 mg/dL Final     Comment:     Non Fasting       AST   Date Value Ref Range Status   01/22/2025 23 0 - 45 U/L Final   09/29/2020 18 0 - 45 U/L Final     ALT   Date Value Ref Range Status   01/22/2025 25 0 - 70 U/L Final   09/29/2020 31 0 - 70 U/L Final     Albumin Urine mg/g Cr   Date Value Ref Range Status   01/22/2025   Final     Comment:     Unable to calculate, urine albumin and/or urine creatinine is outside detectable limits.  Microalbuminuria is defined as  an albumin:creatinine ratio of 17 to 299 for males and 25 to 299 for females. A ratio of albumin:creatinine of 300 or higher is indicative of overt proteinuria.  Due to biologic variability, positive results should be confirmed by a second, first-morning random or 24-hour timed urine specimen. If there is discrepancy, a third specimen is recommended. When 2 out of 3 results are in the microalbuminuria range, this is evidence for incipient nephropathy and warrants increased efforts at glucose control, blood pressure control, and institution of therapy with an angiotensin-converting-enzyme (ACE) inhibitor (if the patient can tolerate it).     11/23/2022 3.96 0.00 - 17.00 mg/g Cr Final   09/29/2020 4.15 0 - 17 mg/g Cr Final     45 minutes spent on the date of the encounter doing chart review, history and exam, documentation and further activities as noted above.                  Again, thank you for allowing me to participate in the care of your patient.        Sincerely,        Rupali Resendiz MD    Electronically signed

## 2025-03-11 NOTE — PROGRESS NOTES
Assessment and plan:    1.  Type 1 diabetes mellitus, well controlled, not known to be complicated by microvascular disease. 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  Counseled on the potential long-term cognitive and cardiac implications.   We re discussed about considering an insulin pump in order to decrease the variability of the blood sugar numbers, the importance of taking insulin 10-15 minutes prior to eating, correction of hypoglycemia.   Plan  - Schedule an appointment with the diabetes educator, Anju, to discuss the potential benefits of using an insulin pump.  - Consider trying an insulin pump to stabilize blood sugar levels and reduce the frequency of hypoglycemic episodes.  - Use Flexitol for dry skin on the feet, available over the counter.  - f/up labs     2. Tines pedis   Prescription for eficonazole entered.     3. Hypercholesterolemia  F/up lipid panel     Orders Placed This Encounter   Procedures    Comprehensive metabolic panel    Lipid panel reflex to direct LDL Fasting    Hematocrit    Albumin Random Urine Quantitative with Creat Ratio    Hemoglobin A1c     =========================================================================================    Tl Sands is a 54 year old man seen in follow-up for type 1 diabetes, diagnosed in 2005.  His last visit in our clinic was in 1/2024.  Kaz Sands, a 54-year-old male, reports an incident that occurred yesterday while walking in his office building in [place]. He slipped on a grassy hill covered with mud and ice, resulting in a fall that injured his left knee. He describes hearing and feeling an internal tear in the knee. An MRI confirmed a tear in the rectus femoris muscle, with the muscle now displaced. He is preparing for surgery and has completed a pre-op physical.    Kaz has a history of diabetes and experiences fluctuations in blood sugar levels, feeling weak and  jittery when levels are low. He reports low blood sugar episodes occurring randomly, sometimes once or twice a week, often before lunch or in the middle of the night. He manages his diabetes with insulin, noting recent changes in his regimen, including using more insulin than before. He typically administers Humalog 10 to 15 minutes before meals but sometimes after, depending on the situation.    He mentions minor eye changes related to diabetes noted in an eye exam last October, which do not affect his vision. He underwent refractive lens replacement a month ago and has since had follow-up appointments confirming resolution of previous issues. He no longer requires glasses.    Kaz also discusses a long-standing issue with dry skin on his left foot, which has been present since he was 15. The dryness sometimes leads to cracking and bleeding, and he suspects a possible fungal infection. He has used various treatments with varying success.    He has considered using an insulin pump but is currently not interested, citing satisfaction with his current routine and concerns about the bulkiness of the device. He mentions family members who have had issues with insulin pumps, leading to hospital visits.     He has not known diabetes complications and his average hemoglobin A1c over the recent years has been around 7%.   Most recent A1c was 6.6%, in January 2025.    Current insulin regimen:  22 unit(s) semglee in am   1 unit Humalog per 8-9 g carbohydrates. On average, he takes 5-8 U per meal and he has 3 meals a day. Takes Humalog before eating ~ 50% of meals.   Correction 1 U per 25 above 120      Diabetes complications:  Last eye exam 10/2024 - nonproliferative DRSilvia Note reviewed.   No h/o proteinuria.  GFR >90; urine microalbumin negative 1/25  No numbness or tingling sensation   He denies prior episodes of loss of consciousness due to hypoglycemia.  The lowest blood glucose he remembers was 40.  Feels weak and jittery  when his BG is low. With severe lows he has sweating.   He does have glucagon at home.  1/23/2025 LDL cholesterol 78, HDL cholesterol 53, triglycerides 63.  On 10 mg atorvastatin daily.  He has been exercising regularly: Kickboxing, cardio exercises.  Denies experiencing lows while exercising.    On the Dexcom sensor, average glucose is 139 with a standard deviation of 56, and a coefficient of variation of 40%, corresponding to an estimated GMI of 6.6%.  68% of the glucose numbers are within target range, 4% are above 250, 9% are in the hypoglycemic range, with 3% below 54.  The patient reports experiencing hypoglycemic symptoms once or twice a week.  The hypoglycemic episodes are unpredictable, symptoms occurring in the middle of the night, sometimes before lunch.  On the sensor, the hypoglycemic episodes occur almost daily but they are mild, mostly in the 60s.  At times, they are preceded by a glucose spike.    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   Type 1 diabetes   Tinea pedis   Finger fracture   ED  LBP     PSM:  B/L inguinal hernia repair   B/L arthroscopic knee surgery        Current Outpatient Medications:     aspirin (ASA) 81 MG EC tablet, Take 1 tablet (81 mg) by mouth daily (Patient not taking: Reported on 1/17/2024), Disp: 90 tablet, Rfl: 3    atorvastatin (LIPITOR) 10 MG tablet, TAKE 1 TABLET BY MOUTH AT BEDTIME, Disp: 90 tablet, Rfl: 0    clobetasol (TEMOVATE) 0.05 % external cream, Apply twice daily up to 1 week for scar, take 1 week off, repeat if needed, Disp: 45 g, Rfl: 0    clotrimazole (LOTRIMIN) 1 % external cream, APPLY  CREAM TOPICALLY TWICE DAILY, Disp: 60 g, Rfl: 0    Continuous Glucose Sensor (DEXCOM G7 SENSOR) MISC, Change every 10 days., Disp: 9 each, Rfl: 3    econazole nitrate 1 % external cream, Apply topically 2 times daily, Disp: 60 g, Rfl: 3    fluorouracil (EFUDEX) 5 % external cream, Apply twice daily for 3 weeks to temples, Disp: 40 g, Rfl: 0    Glucagon (BAQSIMI  ONE PACK) 3 MG/DOSE nasal powder, Spray 1 spray (3 mg) in nostril daily as needed (for severe hypoglycemic episodes), Disp: 1 each, Rfl: 3    Insulin Glargine-yfgn (SEMGLEE, YFGN,) 100 UNIT/ML SOLN, Inject 22 Units Subcutaneous daily, Disp: 30 mL, Rfl: 3    insulin lispro (HUMALOG KWIKPEN) 100 UNIT/ML (1 unit dial) KWIKPEN, INJECT 3 TIMES DAILY SUBCUTANEOUSLY FOR CARB COVERAGE, CORRECTION, AND PRIMING AS DIRECTED. MAX DAILY DOSE 100 UNITS., Disp: 60 mL, Rfl: 2    insulin pen needle (BD PEN NEEDLE MICRO U/F) 32G X 6 MM miscellaneous, USE 1 EACH SUBCUTANEOUSLY 4 TIMES DAILY AS DIRECTED, Disp: 400 each, Rfl: 3    meloxicam (MOBIC) 15 MG tablet, Take 1 tablet (15 mg) by mouth daily as needed for moderate pain (4-6) Profile Rx, Disp: 90 tablet, Rfl: 0    mometasone (ELOCON) 0.1 % external solution, Apply small amount to rash on scalp up to once daily as needed for up to 1 week, Disp: 60 mL, Rfl: 11    omeprazole (PRILOSEC) 40 MG DR capsule, Take 1 capsule (40 mg) by mouth daily, Disp: 90 capsule, Rfl: 3    SEMGLEE, YFGN, 100 UNIT/ML SOPN, INJECT 22  ONCE DAILY, Disp: 15 mL, Rfl: 0    tadalafil (CIALIS) 20 MG tablet, Take 1 tablet (20 mg) by mouth daily as needed (ED)., Disp: 20 tablet, Rfl: 3  No current facility-administered medications for this visit.    Facility-Administered Medications Ordered in Other Visits:     bupivacaine (MARCAINE) injection 0.5% (PF), 2 mL, INTRAPLEURAL, Once, Thai Mims DO    methylPREDNISolone (DEPO-MEDROL) injection 80 mg, 80 mg, Intramuscular, Once, Thai Mims DO    HABITS:  He does not use tobacco or alcohol.      SOCIAL HISTORY:  He is  and lives with his wife and children in [place]. 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      Family history  Maternal grandfather - type 1 diabetes. Paternal uncle also has type 1 diabetes. No f/h of thyroid disease. Paternal uncle - colon cancer.    PHYSICAL EXAMINATION:   Wt Readings from Last 10 Encounters:   03/11/25  101.2 kg (223 lb)   01/17/24 98.9 kg (218 lb)   07/11/23 99.8 kg (220 lb)   01/10/23 99.8 kg (220 lb)   11/23/22 95.3 kg (210 lb)   11/24/21 97.8 kg (215 lb 11.2 oz)   08/17/21 97.6 kg (215 lb 3.2 oz)   01/06/21 101.3 kg (223 lb 4 oz)   09/15/20 95.3 kg (210 lb)   01/09/20 99.3 kg (219 lb)     BP Readings from Last 6 Encounters:   03/11/25 129/84   01/17/24 131/83   07/11/23 134/84   01/10/23 118/79   11/23/22 126/77   11/24/21 127/82     /84   Pulse 63   Resp 16   Wt 101.2 kg (223 lb)   SpO2 97%   BMI 32.23 kg/m      Physical exam:  General appearance: he is well-developed, well-nourished, and in no distress.  Eyes: conjunctivae and extraocular motions are normal. Pupils are equal, round, and reactive to light. No lid lag, no stare.  HENT: oropharynx clear and moist; neck no JVD, no bruits, no thyromegaly, no palpable nodules  Cardiovascular: regular rhythm, no murmurs, distal pulses palpable, no edema  Respiratory: chest clear, no rales, no rhonchi  Musculoskeletal: normal tone and strength   Neurologic: reflexes normal and symmetric, no resting tremor  Psychiatric: affect and judgment normal   Skin: Mild dystrophy at the site of prior abdominal insulin injections - mainly right lower quadrant   Feet: sensation intact to monofilament testing; onychomycosis and dry skin, tines pedis     LABORATORY TESTS:   I reviewed prior lab results documented in Epic.   Lab Results   Component Value Date    A1C 6.6 (H) 01/22/2025    A1C 6.4 (H) 01/17/2024    A1C 6.6 (H) 11/23/2022    A1C 7.8 (H) 02/01/2022    A1C 7.6 (H) 09/10/2021    A1C 6.7 (H) 09/29/2020    A1C 8.0 (A) 08/28/2019    A1C 7.2 08/29/2018    A1C 7.1 (H) 07/26/2017    A1C 7.6 09/13/2016    HEMOGLOBINA1 6.6 (A) 07/11/2023       Hemoglobin   Date Value Ref Range Status   11/22/2019 15.1 13.3 - 17.7 g/dL Final     Hematocrit   Date Value Ref Range Status   01/22/2025 44.4 40.0 - 53.0 % Final   09/29/2020 42.4 40.0 - 53.0 % Final     Cholesterol   Date  Value Ref Range Status   01/23/2025 144 <200 mg/dL Final   09/29/2020 141 <200 mg/dL Final     Cholesterol/HDL Ratio   Date Value Ref Range Status   03/17/2015 3.2 0.0 - 5.0 Final     HDL Cholesterol   Date Value Ref Range Status   09/29/2020 54 >39 mg/dL Final     Direct Measure HDL   Date Value Ref Range Status   01/23/2025 53 >=40 mg/dL Final     LDL Cholesterol Calculated   Date Value Ref Range Status   01/23/2025 78 <100 mg/dL Final   09/29/2020 69 <100 mg/dL Final     Comment:     Desirable:       <100 mg/dl     VLDL-Cholesterol   Date Value Ref Range Status   03/17/2015 23 0 - 30 mg/dL Final     Triglycerides   Date Value Ref Range Status   01/23/2025 63 <150 mg/dL Final   09/29/2020 91 <150 mg/dL Final     Comment:     Non Fasting     Albumin Urine mg/L   Date Value Ref Range Status   01/22/2025 <12.0 mg/L Final     Comment:     The reference ranges have not been established in urine albumin. The results should be integrated into the clinical context for interpretation.   11/23/2022 8 mg/L Final   09/29/2020 10 mg/L Final     TSH   Date Value Ref Range Status   11/23/2022 1.56 0.40 - 4.00 mU/L Final   07/26/2017 1.37 0.40 - 4.00 mU/L Final         Last Basic Metabolic Panel:    Sodium   Date Value Ref Range Status   01/22/2025 142 135 - 145 mmol/L Final   09/29/2020 139 133 - 144 mmol/L Final     Potassium   Date Value Ref Range Status   01/22/2025 4.2 3.4 - 5.3 mmol/L Final   11/23/2022 4.0 3.4 - 5.3 mmol/L Final   09/29/2020 4.2 3.4 - 5.3 mmol/L Final     Chloride   Date Value Ref Range Status   01/22/2025 105 98 - 107 mmol/L Final   11/23/2022 108 94 - 109 mmol/L Final   09/29/2020 107 94 - 109 mmol/L Final     Calcium   Date Value Ref Range Status   01/22/2025 9.6 8.8 - 10.4 mg/dL Final     Comment:     Reference intervals for this test were updated on 7/16/2024 to reflect our healthy population more accurately. There may be differences in the flagging of prior results with similar values performed with  this method. Those prior results can be interpreted in the context of the updated reference intervals.   09/29/2020 8.4 (L) 8.5 - 10.1 mg/dL Final     Carbon Dioxide   Date Value Ref Range Status   09/29/2020 29 20 - 32 mmol/L Final     Carbon Dioxide (CO2)   Date Value Ref Range Status   01/22/2025 26 22 - 29 mmol/L Final   11/23/2022 28 20 - 32 mmol/L Final     Urea Nitrogen   Date Value Ref Range Status   01/22/2025 14.7 6.0 - 20.0 mg/dL Final   11/23/2022 18 7 - 30 mg/dL Final   09/29/2020 21 7 - 30 mg/dL Final     Creatinine   Date Value Ref Range Status   01/22/2025 0.98 0.67 - 1.17 mg/dL Final   09/29/2020 0.81 0.66 - 1.25 mg/dL Final     GFR Estimate   Date Value Ref Range Status   01/22/2025 >90 >60 mL/min/1.73m2 Final     Comment:     eGFR calculated using 2021 CKD-EPI equation.   09/29/2020 >90 >60 mL/min/[1.73_m2] Final     Comment:     Non  GFR Calc  Starting 12/18/2018, serum creatinine based estimated GFR (eGFR) will be   calculated using the Chronic Kidney Disease Epidemiology Collaboration   (CKD-EPI) equation.       Glucose   Date Value Ref Range Status   01/22/2025 164 (H) 70 - 99 mg/dL Final   11/23/2022 127 (H) 70 - 99 mg/dL Final   09/29/2020 226 (H) 70 - 99 mg/dL Final     Comment:     Non Fasting       AST   Date Value Ref Range Status   01/22/2025 23 0 - 45 U/L Final   09/29/2020 18 0 - 45 U/L Final     ALT   Date Value Ref Range Status   01/22/2025 25 0 - 70 U/L Final   09/29/2020 31 0 - 70 U/L Final     Albumin Urine mg/g Cr   Date Value Ref Range Status   01/22/2025   Final     Comment:     Unable to calculate, urine albumin and/or urine creatinine is outside detectable limits.  Microalbuminuria is defined as an albumin:creatinine ratio of 17 to 299 for males and 25 to 299 for females. A ratio of albumin:creatinine of 300 or higher is indicative of overt proteinuria.  Due to biologic variability, positive results should be confirmed by a second, first-morning random or  24-hour timed urine specimen. If there is discrepancy, a third specimen is recommended. When 2 out of 3 results are in the microalbuminuria range, this is evidence for incipient nephropathy and warrants increased efforts at glucose control, blood pressure control, and institution of therapy with an angiotensin-converting-enzyme (ACE) inhibitor (if the patient can tolerate it).     11/23/2022 3.96 0.00 - 17.00 mg/g Cr Final   09/29/2020 4.15 0 - 17 mg/g Cr Final     45 minutes spent on the date of the encounter doing chart review, history and exam, documentation and further activities as noted above.

## 2025-03-11 NOTE — NURSING NOTE
Tl Sands's goals for this visit include:   Chief Complaint   Patient presents with    Follow Up    Diabetes       He requests these members of his care team be copied on today's visit information: yes    PCP: Jakub Wolff    Referring Provider:  Referred Self, MD  No address on file    /84   Pulse 63   Resp 16   Wt 101.2 kg (223 lb)   SpO2 97%   BMI 32.23 kg/m      Do you need any medication refills at today's visit? None    Cj Lopez, EMT

## 2025-03-12 ENCOUNTER — OFFICE VISIT (OUTPATIENT)
Dept: FAMILY MEDICINE | Facility: CLINIC | Age: 55
End: 2025-03-12
Payer: COMMERCIAL

## 2025-03-12 ENCOUNTER — TELEPHONE (OUTPATIENT)
Dept: FAMILY MEDICINE | Facility: CLINIC | Age: 55
End: 2025-03-12

## 2025-03-12 VITALS
SYSTOLIC BLOOD PRESSURE: 110 MMHG | WEIGHT: 227 LBS | DIASTOLIC BLOOD PRESSURE: 80 MMHG | HEIGHT: 69 IN | OXYGEN SATURATION: 96 % | HEART RATE: 64 BPM | RESPIRATION RATE: 12 BRPM | TEMPERATURE: 97.3 F | BODY MASS INDEX: 33.62 KG/M2

## 2025-03-12 DIAGNOSIS — E10.9 TYPE 1 DIABETES MELLITUS WITHOUT COMPLICATION (H): ICD-10-CM

## 2025-03-12 DIAGNOSIS — Z01.818 PREOPERATIVE EXAMINATION: Primary | ICD-10-CM

## 2025-03-12 DIAGNOSIS — S76.811D RUPTURE OF RIGHT RECTUS FEMORIS TENDON, SUBSEQUENT ENCOUNTER: ICD-10-CM

## 2025-03-12 PROCEDURE — 99214 OFFICE O/P EST MOD 30 MIN: CPT | Performed by: PHYSICIAN ASSISTANT

## 2025-03-12 PROCEDURE — 3079F DIAST BP 80-89 MM HG: CPT | Performed by: PHYSICIAN ASSISTANT

## 2025-03-12 PROCEDURE — 3074F SYST BP LT 130 MM HG: CPT | Performed by: PHYSICIAN ASSISTANT

## 2025-03-12 PROCEDURE — 1126F AMNT PAIN NOTED NONE PRSNT: CPT | Performed by: PHYSICIAN ASSISTANT

## 2025-03-12 ASSESSMENT — PAIN SCALES - GENERAL: PAINLEVEL_OUTOF10: NO PAIN (0)

## 2025-03-12 NOTE — TELEPHONE ENCOUNTER
3/10/2025   Surgical Information   What procedure is being done? Repair rectus femoris rupture on left side    Facility or Hospital where procedure/surgery will be performed: [facility]   Who is doing the procedure / surgery? Dr. Eliud Souza   Date of surgery / procedure: 3/13/25   Time of surgery / procedure: 9:00am   Where do you plan to recover after surgery? at home with family      Fax number for surgical facility: [phone]

## 2025-03-12 NOTE — TELEPHONE ENCOUNTER
Left message for the patient that a different prescription has been sent in. Asked patient to call back with any questions.    Phoebe Lira CMA  Adult Endocrinology  [facility], [place]
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
## 2025-03-17 DIAGNOSIS — E10.9 TYPE 1 DIABETES MELLITUS WITHOUT COMPLICATION (H): ICD-10-CM

## 2025-03-20 RX ORDER — PEN NEEDLE, DIABETIC 32 GX 1/4"
NEEDLE, DISPOSABLE MISCELLANEOUS
Qty: 400 EACH | Refills: 11 | Status: SHIPPED | OUTPATIENT
Start: 2025-03-20

## 2025-03-20 NOTE — TELEPHONE ENCOUNTER
Last Written Prescription:     insulin pen needle (BD PEN NEEDLE MICRO U/F) 32G X 6 MM miscellaneous 400 each 3 12/18/2023 -- No   Sig: USE 1 EACH SUBCUTANEOUSLY 4 TIMES DAILY AS DIRECTED     ----------------------  Last Visit Date: 3/11/25  Future Visit Date: 3/11/26  ----------------------      Refill decision: Medication refilled per  Medication Refill in Ambulatory Care  policy.       Request from pharmacy:  Requested Prescriptions   Pending Prescriptions Disp Refills    BD PEN NEEDLE MICRO U/F 32G X 6 MM miscellaneous [Pharmacy Med Name: BD UF MCRO PEN NDL [id number] MIS]  0     Sig: USE 1 EACH SUBCUTANEOUSLY 4 TIMES DAILY AS DIRECTED       Diabetic Supplies Protocol Passed - 3/20/2025  3:23 PM        Passed - Medication is active on med list and the sig matches. RN to manually verify dose and sig if red X/fail.     If the protocol passes (green check), you do not need to verify med dose and sig.    A prescription matches if they are the same clinical intention.    For Example: once daily and every morning are the same.    The protocol can not identify upper and lower case letters as matching and will fail.     For Example: Take 1 tablet (50 mg) by mouth daily     TAKE 1 TABLET (50 MG) BY MOUTH DAILY    For all fails (red x), verify dose and sig.    If the refill does match what is on file, the RN can still proceed to approve the refill request.       If they do not match, route to the appropriate provider.             Passed - Recent (12 month) or future (90 days) visit with authorizing provider s specialty     The patient must have completed an in-person or virtual visit within the past 12 months or has a future visit scheduled within the next 90 days with the authorizing provider s specialty.  Urgent care and e-visits do not qualify as an office visit for this protocol.          Passed - Medication indicated for associated diagnosis        Passed - Patient is 18 years of age or older

## 2025-03-24 ENCOUNTER — TRANSFERRED RECORDS (OUTPATIENT)
Dept: HEALTH INFORMATION MANAGEMENT | Facility: CLINIC | Age: 55
End: 2025-03-24
Payer: COMMERCIAL

## 2025-04-21 DIAGNOSIS — E10.9 TYPE I DIABETES MELLITUS, WELL CONTROLLED (H): ICD-10-CM

## 2025-04-21 RX ORDER — INSULIN GLARGINE-YFGN 100 [IU]/ML
22 INJECTION, SOLUTION SUBCUTANEOUS DAILY
Qty: 15 ML | Refills: 1 | Status: SHIPPED | OUTPATIENT
Start: 2025-04-21

## 2025-04-21 NOTE — TELEPHONE ENCOUNTER
Last Written Prescription:  SEMGLEE, YFGN, 100 UNIT/ML SOPN 15 mL 0 2/17/2025 -- No   Sig: INJECT 22  ONCE DAILY     ----------------------  Last Visit Date: 3-11-25  Future Visit Date: 3-17-25    Refill decision: Medication unable to be refilled by RN due to: Other:  Insulin and insulin pump supplies - refilled per Endocrine clinic.         Request from pharmacy:  Requested Prescriptions   Pending Prescriptions Disp Refills    SEMGLEE (HELENGN) 100 UNIT/ML SOPN [Pharmacy Med Name: Semglee (yfgn) 100 UNIT/ML Subcutaneous Solution Pen-injector] 15 mL 0     Sig: INJECT 22 UNITS SUBCUTANEOUSLY ONCE DAILY . APPOINTMENT REQUIRED FOR FUTURE REFILLS       Insulin Protocol Failed - 4/21/2025  7:01 AM        Failed - Medication is active on med list and the sig matches. RN to manually verify dose and sig if red X/fail.     If the protocol passes (green check), you do not need to verify med dose and sig.    A prescription matches if they are the same clinical intention.    For Example: once daily and every morning are the same.    The protocol can not identify upper and lower case letters as matching and will fail.     For Example: Take 1 tablet (50 mg) by mouth daily     TAKE 1 TABLET (50 MG) BY MOUTH DAILY    For all fails (red x), verify dose and sig.    If the refill does match what is on file, the RN can still proceed to approve the refill request.       If they do not match, route to the appropriate provider.             Failed - Chart review required     Review Chart.    Do not approve if insulin is used in a pump.  Instead, direct refill request to the patient's endocrinologist.  If the patient doesn't have an endocrinologist, then send the refill to the patient's PCP for review            Passed - HgbA1C in past 3 or 6 months     If HgbA1C is 8 or greater, it needs to be on file within the past 3 months.  If less than 8, must be on file within the past 6 months.     Recent Labs   Lab Test 01/22/25  0722 01/17/24  0840  07/11/23  1533   A1C 6.6*   < >  --    HEMOGLOBINA1  --   --  6.6*    < > = values in this interval not displayed.             Passed - Recent (6 mo) or future (90 days) visit within the authorizing provider's specialty     The patient must have completed an in-person or virtual visit within the past 6 months or has a future visit scheduled within the next 90 days with the authorizing provider s specialty.  Urgent care and e-visits do not quality as an office visit for this protocol.          Passed - Medication indicated for associated diagnosis     Medication is associated with one or more of the following diagnoses:   - Type 1 diabetes mellitus  - Type 2 diabetes mellitus  - Diabetic nephropathy; Prophylaxis  - Neuropathy due to diabetes mellitus; Prophylaxis  - Retinopathy due to diabetes mellitus; Prophylaxis  - Diabetes mellitus associated with cystic fibrosis  - Disorder of cardiovascular system; Prophylaxis - Type 1 diabetes mellitus   - Disorder of cardiovascular system; Prophylaxis - Type 2 diabetes mellitus            Passed - Patient is 18 years of age or older

## 2025-04-28 ENCOUNTER — TRANSFERRED RECORDS (OUTPATIENT)
Dept: HEALTH INFORMATION MANAGEMENT | Facility: CLINIC | Age: 55
End: 2025-04-28
Payer: COMMERCIAL

## 2025-05-03 DIAGNOSIS — E10.9 TYPE 1 DIABETES MELLITUS WITHOUT COMPLICATION (H): ICD-10-CM

## 2025-05-05 RX ORDER — ATORVASTATIN CALCIUM 10 MG/1
10 TABLET, FILM COATED ORAL AT BEDTIME
Qty: 90 TABLET | Refills: 2 | Status: SHIPPED | OUTPATIENT
Start: 2025-05-05

## 2025-05-05 NOTE — TELEPHONE ENCOUNTER
Last Written Prescription:  atorvastatin (LIPITOR) 10 MG tablet  10 mg, AT BEDTIME           Summary: TAKE 1 TABLET BY MOUTH AT BEDTIME, Disp-90 tablet, R-0, E-Prescribe  Dose, Route, Frequency: 10 mg, Oral, AT BEDTIMEStart: 02/05/2025Ord/Sold: 02/05/2025 (O)Ordered On: 02/05/2025Pharmacy: [address] NO.ReportDx Associated: Taking: Long-term: Med Note:              Patient Sig: TAKE 1 TABLET BY MOUTH AT BEDTIME  Ordering Department: [facility]  Authorized By: Rupali Resendiz MD  Dispense: 90 tablet  Refills: 0 ordered       ----------------------  Last Visit Date: 03/11/2025 Office Visit Endo - URBAN Resendiz  Future Visit Date: 3/17/26  ----------------------  Recent Labs   Lab Test 01/23/25  0732 01/17/24  0848   CHOL 144 161   HDL 53 56   LDL 78 94   TRIG 63 55        Refill decision: Medication refilled per  Medication Refill in Ambulatory Care  policy.   []  If no future appointment scheduled: Route to Clinic Coordinators to contact the pt for appointment.      Cassandra MAN RN  [facility] Triage & Med Refill Team        Request from pharmacy:  Requested Prescriptions   Pending Prescriptions Disp Refills    atorvastatin (LIPITOR) 10 MG tablet [Pharmacy Med Name: Atorvastatin Calcium 10 MG Oral Tablet] 90 tablet 0     Sig: TAKE 1 TABLET BY MOUTH AT BEDTIME       Antihyperlipidemic agents Passed - 5/5/2025  4:14 PM        Passed - LDL on file in the past 12 months        Passed - Medication is active on med list and the sig matches. RN to manually verify dose and sig if red X/fail.     If the protocol passes (green check), you do not need to verify med dose and sig.    A prescription matches if they are the same clinical intention.    For Example: once daily and every morning are the same.    The protocol can not identify upper and lower case letters as matching and will fail.     For Example: Take 1 tablet (50 mg) by mouth daily     TAKE 1  TABLET (50 MG) BY MOUTH DAILY    For all fails (red x), verify dose and sig.    If the refill does match what is on file, the RN can still proceed to approve the refill request.       If they do not match, route to the appropriate provider.             Passed - Recent (12 mo) or future (90 days) visit within the authorizing provider's specialty     The patient must have completed an in-person or virtual visit within the past 12 months or has a future visit scheduled within the next 90 days with the authorizing provider s specialty.  Urgent care and e-visits do not qualify as an office visit for this protocol.          Passed - Patient is age 18 years or older

## 2025-06-03 ENCOUNTER — RESULTS FOLLOW-UP (OUTPATIENT)
Dept: ENDOCRINOLOGY | Facility: CLINIC | Age: 55
End: 2025-06-03

## 2025-08-03 ENCOUNTER — HEALTH MAINTENANCE LETTER (OUTPATIENT)
Age: 55
End: 2025-08-03

## 2025-08-22 DIAGNOSIS — E10.9 TYPE I DIABETES MELLITUS, WELL CONTROLLED (H): ICD-10-CM

## 2025-08-25 RX ORDER — INSULIN GLARGINE-YFGN 100 [IU]/ML
INJECTION, SOLUTION SUBCUTANEOUS
Qty: 15 ML | Refills: 0 | Status: SHIPPED | OUTPATIENT
Start: 2025-08-25

## (undated) DEVICE — SOL WATER IRRIG 1000ML BOTTLE 07139-09

## (undated) RX ORDER — FENTANYL CITRATE 50 UG/ML
INJECTION, SOLUTION INTRAMUSCULAR; INTRAVENOUS
Status: DISPENSED
Start: 2018-12-18

## (undated) RX ORDER — FENTANYL CITRATE 50 UG/ML
INJECTION, SOLUTION INTRAMUSCULAR; INTRAVENOUS
Status: DISPENSED
Start: 2018-10-08

## (undated) RX ORDER — BETAMETHASONE SODIUM PHOSPHATE AND BETAMETHASONE ACETATE 3; 3 MG/ML; MG/ML
INJECTION, SUSPENSION INTRA-ARTICULAR; INTRALESIONAL; INTRAMUSCULAR; SOFT TISSUE
Status: DISPENSED
Start: 2020-08-06

## (undated) RX ORDER — LIDOCAINE HYDROCHLORIDE 10 MG/ML
INJECTION, SOLUTION EPIDURAL; INFILTRATION; INTRACAUDAL; PERINEURAL
Status: DISPENSED
Start: 2020-08-06